# Patient Record
Sex: MALE | Race: WHITE | NOT HISPANIC OR LATINO | Employment: FULL TIME | URBAN - METROPOLITAN AREA
[De-identification: names, ages, dates, MRNs, and addresses within clinical notes are randomized per-mention and may not be internally consistent; named-entity substitution may affect disease eponyms.]

---

## 2017-04-06 ENCOUNTER — ALLSCRIPTS OFFICE VISIT (OUTPATIENT)
Dept: OTHER | Facility: OTHER | Age: 27
End: 2017-04-06

## 2017-04-06 DIAGNOSIS — I10 ESSENTIAL (PRIMARY) HYPERTENSION: ICD-10-CM

## 2017-05-06 ENCOUNTER — LAB CONVERSION - ENCOUNTER (OUTPATIENT)
Dept: OTHER | Facility: OTHER | Age: 27
End: 2017-05-06

## 2017-05-06 LAB
A/G RATIO (HISTORICAL): 2 (CALC) (ref 1–2.5)
ALBUMIN SERPL BCP-MCNC: 4.7 G/DL (ref 3.6–5.1)
ALP SERPL-CCNC: 110 U/L (ref 40–115)
ALT SERPL W P-5'-P-CCNC: 16 U/L (ref 9–46)
AST SERPL W P-5'-P-CCNC: 17 U/L (ref 10–40)
BASOPHILS # BLD AUTO: 1.1 %
BASOPHILS # BLD AUTO: 78 CELLS/UL (ref 0–200)
BILIRUB SERPL-MCNC: 1.3 MG/DL (ref 0.2–1.2)
BUN SERPL-MCNC: 11 MG/DL (ref 7–25)
BUN/CREA RATIO (HISTORICAL): ABNORMAL (CALC) (ref 6–22)
CALCIUM SERPL-MCNC: 9.7 MG/DL (ref 8.6–10.3)
CHLORIDE SERPL-SCNC: 103 MMOL/L (ref 98–110)
CHOLEST SERPL-MCNC: 140 MG/DL (ref 125–200)
CHOLEST/HDLC SERPL: 2.9 (CALC)
CO2 SERPL-SCNC: 28 MMOL/L (ref 20–31)
CREAT SERPL-MCNC: 0.84 MG/DL (ref 0.6–1.35)
DEPRECATED RDW RBC AUTO: 12.6 % (ref 11–15)
EGFR AFRICAN AMERICAN (HISTORICAL): 138 ML/MIN/1.73M2
EGFR-AMERICAN CALC (HISTORICAL): 119 ML/MIN/1.73M2
EOSINOPHIL # BLD AUTO: 156 CELLS/UL (ref 15–500)
EOSINOPHIL # BLD AUTO: 2.2 %
GAMMA GLOBULIN (HISTORICAL): 2.4 G/DL (CALC) (ref 1.9–3.7)
GLUCOSE (HISTORICAL): 88 MG/DL (ref 65–99)
HCT VFR BLD AUTO: 46.1 % (ref 38.5–50)
HDLC SERPL-MCNC: 49 MG/DL
HGB BLD-MCNC: 15.1 G/DL (ref 13.2–17.1)
LDL CHOLESTEROL (HISTORICAL): 80 MG/DL (CALC)
LYMPHOCYTES # BLD AUTO: 2066 CELLS/UL (ref 850–3900)
LYMPHOCYTES # BLD AUTO: 29.1 %
MCH RBC QN AUTO: 28.8 PG (ref 27–33)
MCHC RBC AUTO-ENTMCNC: 32.7 G/DL (ref 32–36)
MCV RBC AUTO: 88.3 FL (ref 80–100)
MONOCYTES # BLD AUTO: 518 CELLS/UL (ref 200–950)
MONOCYTES (HISTORICAL): 7.3 %
NEUTROPHILS # BLD AUTO: 4281 CELLS/UL (ref 1500–7800)
NEUTROPHILS # BLD AUTO: 60.3 %
NON-HDL-CHOL (CHOL-HDL) (HISTORICAL): 91 MG/DL (CALC)
PLATELET # BLD AUTO: 291 THOUSAND/UL (ref 140–400)
PMV BLD AUTO: 10.2 FL (ref 7.5–12.5)
POTASSIUM SERPL-SCNC: 4.4 MMOL/L (ref 3.5–5.3)
RBC # BLD AUTO: 5.22 MILLION/UL (ref 4.2–5.8)
SODIUM SERPL-SCNC: 139 MMOL/L (ref 135–146)
TOTAL PROTEIN (HISTORICAL): 7.1 G/DL (ref 6.1–8.1)
TRIGL SERPL-MCNC: 57 MG/DL
TSH SERPL DL<=0.05 MIU/L-ACNC: 3.65 MIU/L (ref 0.4–4.5)
WBC # BLD AUTO: 7.1 THOUSAND/UL (ref 3.8–10.8)

## 2017-06-19 ENCOUNTER — ALLSCRIPTS OFFICE VISIT (OUTPATIENT)
Dept: OTHER | Facility: OTHER | Age: 27
End: 2017-06-19

## 2017-08-17 ENCOUNTER — ALLSCRIPTS OFFICE VISIT (OUTPATIENT)
Dept: OTHER | Facility: OTHER | Age: 27
End: 2017-08-17

## 2017-08-31 ENCOUNTER — ALLSCRIPTS OFFICE VISIT (OUTPATIENT)
Dept: OTHER | Facility: OTHER | Age: 27
End: 2017-08-31

## 2017-11-07 ENCOUNTER — ALLSCRIPTS OFFICE VISIT (OUTPATIENT)
Dept: OTHER | Facility: OTHER | Age: 27
End: 2017-11-07

## 2017-11-08 NOTE — PROGRESS NOTES
Assessment  1  Benign essential hypertension (401 1) (I10)   2  Hypothyroidism (244 9) (E03 9)   3  Migraine headache (346 90) (G43 909)   4  Positive UGO (antinuclear antibody) (795 79) (R76 8)    Plan  Benign essential hypertension    · (1) CBC/PLT/DIFF; Status:Active; Requested for:16Qfw2721;    · (1) COMPREHENSIVE METABOLIC PANEL; Status:Active; Requested for:90Ord1368;   Hypothyroidism    · (1) TSH; Status:Active; Requested for:98Qxa9100; Positive UGO (antinuclear antibody)    · (1) UGO SCREEN W/REFLEX TO TITER/PATTERN; Status:Active; Requested for:07May2018; Discussion/Summary  Discussion Summary:   Hypertension -blood pressure is stable, patient is not on any medication  TSH recently was done by his rheumatologist and was normal  I will recheck the labs in 6 months  He will go back to the rheumatologist for a thyroid ultrasound as he has thyroid nodules  UGO-the levels are coming down  We will check the labs in 6 months  Counseling Documentation With Imm: The patient was counseled regarding diagnostic results,-- instructions for management,-- risk factor reductions,-- risks and benefits of treatment options,-- importance of compliance with treatment  Medication SE Review and Pt Understands Tx: Possible side effects of new medications were reviewed with the patient/guardian today  The treatment plan was reviewed with the patient/guardian  The patient/guardian understands and agrees with the treatment plan      Chief Complaint  Chief Complaint Chronic Condition [facility]: Patient is here today for follow up of chronic conditions described in HPI  History of Present Illness  Hypothyroidism (Follow-Up): The patient is being seen for follow-up of hypothyroidism of undetermined etiology  The patient reports no change in the condition  He has had no significant interval events  The patient is currently asymptomatic  The patient is not currently on medication for this problem     Hypertension (Follow-Up): The patient presents for follow-up of essential hypertension  The patient states he has been stable with his blood pressure control since the last visit  He has no comorbid illnesses  He has no significant interval events  Symptoms: The patient is currently asymptomatic  Medications: The patient is not currently on any medications for his hypertension--lifestyle modification only  Review of Systems  Complete-Male:   Constitutional: No fever or chills, feels well, no tiredness, no recent weight gain or weight loss  Eyes: No complaints of eye pain, no red eyes, no discharge from eyes, no itchy eyes  ENT: no complaints of earache, no hearing loss, no nosebleeds, no nasal discharge, no sore throat, no hoarseness  Cardiovascular: No complaints of slow heart rate, no fast heart rate, no chest pain, no palpitations, no leg claudication, no lower extremity  Respiratory: No complaints of shortness of breath, no wheezing, no cough, no SOB on exertion, no orthopnea or PND  Gastrointestinal: No complaints of abdominal pain, no constipation, no nausea or vomiting, no diarrhea or bloody stools  Genitourinary: No complaints of dysuria, no incontinence, no hesitancy, no nocturia, no genital lesion, no testicular pain  Musculoskeletal: No complaints of arthralgia, no myalgias, no joint swelling or stiffness, no limb pain or swelling  Integumentary: No complaints of skin rash or skin lesions, no itching, no skin wound, no dry skin  Neurological: No compliants of headache, no confusion, no convulsions, no numbness or tingling, no dizziness or fainting, no limb weakness, no difficulty walking  Psychiatric: Is not suicidal, no sleep disturbances, no anxiety or depression, no change in personality, no emotional problems  Endocrine: No complaints of proptosis, no hot flashes, no muscle weakness, no erectile dysfunction, no deepening of the voice, no feelings of weakness     Hematologic/Lymphatic: No complaints of swollen glands, no swollen glands in the neck, does not bleed easily, no easy bruising  Active Problems  1  Benign essential hypertension (401 1) (I10)   2  Hypothyroidism (244 9) (E03 9)   3  Migraine headache (346 90) (G43 909)   4  Positive UGO (antinuclear antibody) (795 79) (R76 8)    Past Medical History  1  History of Blood in stool (578 1) (K92 1)   2  History of Eosinophilic esophagitis (774 28) (K20 0)   3  History of scabies (V12 09) (Z86 19)   4  History of sinusitis (V12 69) (Z87 09)   5  History of Need for influenza vaccination (V04 81) (Z23)   6  History of Screening for cardiovascular condition (V81 2) (Z13 6)  Active Problems And Past Medical History Reviewed: The active problems and past medical history were reviewed and updated today  Surgical History  1  History of Cystoscopy For Urethral Stricture   2  History of Tonsillectomy    Family History  Family History    1  Family history of Heart disease (429 9) (I51 9)   2  Family history of Migraine (346 90) (G43 909)    Social History   · Non-smoker (V49 89) (Z78 9)  Social History Reviewed: The social history was reviewed and updated today  The social history was reviewed and is unchanged  Current Meds   1  SUMAtriptan Succinate 25 MG Oral Tablet; TAKE 1 TABLET FOR MIGRAINE RELIEF  MAY REPEAT   EVERY 2 HOURS  MAX 200MG/DAY; Therapy: [id number] to (Last Rx:06Apr2017)  Requested for: 06Apr2017 Ordered  Medication List Reviewed: The medication list was reviewed and updated today  Allergies  1  No Known Drug Allergies  2  Milk   3  No Known Environmental Allergies    Vitals  Vital Signs    Recorded: 50LMO9940 01:44PM   Heart Rate 100   Respiration 16   Systolic 602   Diastolic 70   Height 6 ft 1 in   Weight 179 lb    BMI Calculated 23 62   BSA Calculated 2 05     Physical Exam    Constitutional   General appearance: No acute distress, well appearing and well nourished      Eyes   Conjunctiva and lids: No swelling, erythema, or discharge  Ears, Nose, Mouth, and Throat   External inspection of ears and nose: Normal     Otoscopic examination: Tympanic membrance translucent with normal light reflex  Canals patent without erythema  Oropharynx: Normal with no erythema, edema, exudate or lesions  Pulmonary   Respiratory effort: No increased work of breathing or signs of respiratory distress  Auscultation of lungs: Clear to auscultation, equal breath sounds bilaterally, no wheezes, no rales, no rhonci  Cardiovascular   Palpation of heart: Normal PMI, no thrills  Auscultation of heart: Normal rate and rhythm, normal S1 and S2, without murmurs  Examination of extremities for edema and/or varicosities: Normal     Abdomen   Abdomen: Non-tender, no masses  Lymphatic   Palpation of lymph nodes in neck: No lymphadenopathy  Musculoskeletal   Gait and station: Normal     Skin   Skin and subcutaneous tissue: Normal without rashes or lesions  Neurologic   Cranial nerves: Cranial nerves 2-12 intact  Psychiatric   Orientation to person, place and time: Normal     Mood and affect: Normal          Future Appointments    Date/Time Provider Specialty Site   05/15/2018 09:45 AM KELLEY Morrison   Internal Medicine [facility]     Signatures   Electronically signed by : KELLEY Bryan ; Nov 7 2017  5:28PM EST                       (Author)

## 2017-12-12 ENCOUNTER — ALLSCRIPTS OFFICE VISIT (OUTPATIENT)
Dept: OTHER | Facility: OTHER | Age: 27
End: 2017-12-12

## 2017-12-13 NOTE — CONSULTS
Assessment    1  Positive UGO (antinuclear antibody) (424 79) (R76 8)   2  Bleeding per rectum (569 3) (K62 5)    Plan  Bleeding per rectum, Eosinophilic esophagitis    · Hydrocortisone Acetate 25 MG Rectal Suppository; INSERT 1 SUPPOSITORYRECTALLY 2 TIMES DAILY   Rx By: Nyasia Strong; Dispense: 14 Days ; #:28 Suppository; Refill: 2;Bleeding per rectum, Eosinophilic esophagitis; LAKISHA = N; Sent To: [facility]    Discussion/Summary  Discussion Summary:   Is a 29-year-old male with likely internal hemorrhoid bleeding  We are long discussion about dietary triggers  He has no alarm symptoms concerning for Crohn's disease or ulcerative colitis  we'll send hydrocortisone suppository twice a day for 2 weeks  He will also go on a fiber supplement daily  Counseling Documentation With Imm: The patient was counseled regarding diagnostic results,-- prognosis,-- risks and benefits of treatment options  History of Present Illness  HPI: He is a 29-year-old male that had 2 days minor amount of rectal bleeding  Was bright red blood he was on the outside of the stool  He denies abdominal pain diarrhea constipation melena or true hematochezia  There is no family history of Crohn's disease or ulcerative colitis  He was diagnosed with pediatric eosinophilic esophagitis he eatIs vegan  HIs weight is stable  Review of Systems  Complete-Male GI Adult:  Constitutional: No fever or chills, feels well, no tiredness, no recent weight gain or weight loss  Eyes: No complaints of eye pain, no red eyes, no discharge from eyes, no itchy eyes  ENT: no complaints of earache, no hearing loss, no nosebleeds, no nasal discharge, no sore throat, no hoarseness  Cardiovascular: No complaints of slow heart rate, no fast heart rate, no chest pain, no palpitations, no leg claudication, no lower extremity  Respiratory: No complaints of shortness of breath, no wheezing, no cough, no SOB on exertion, no orthopnea or PND  Gastrointestinal: as noted in HPI  Genitourinary: No complaints of dysuria, no incontinence, no hesitancy, no nocturia, no genital lesion, no testicular pain  Musculoskeletal: No complaints of arthralgia, no myalgias, no joint swelling or stiffness, no limb pain or swelling  Integumentary: No complaints of skin rash or skin lesions, no itching, no skin wound, no dry skin  Neurological: No compliants of headache, no confusion, no convulsions, no numbness or tingling, no dizziness or fainting, no limb weakness, no difficulty walking  Psychiatric: Is not suicidal, no sleep disturbances, no anxiety or depression, no change in personality, no emotional problems  Endocrine: No complaints of proptosis, no hot flashes, no muscle weakness, no erectile dysfunction, no deepening of the voice, no feelings of weakness  Hematologic/Lymphatic: No complaints of swollen glands, no swollen glands in the neck, does not bleed easily, no easy bruising  ROS Reviewed:   ROS reviewed  Active Problems  1  Benign essential hypertension (401 1) (I10)   2  Hypothyroidism (244 9) (E03 9)   3  Migraine headache (346 90) (G43 909)   4  Positive UGO (antinuclear antibody) (795 79) (R76 8)    Past Medical History  1  History of Blood in stool (578 1) (K92 1)   2  History of scabies (V12 09) (Z86 19)   3  History of sinusitis (V12 69) (Z87 09)   4  History of Need for influenza vaccination (V04 81) (Z23)   5  History of Screening for cardiovascular condition (V81 2) (Z13 6)  Active Problems And Past Medical History Reviewed: The active problems and past medical history were reviewed and updated today  Surgical History  1  History of Cystoscopy For Urethral Stricture   2  History of Tonsillectomy  Surgical History Reviewed: The surgical history was reviewed and updated today  Family History  Family History    1  Family history of Heart disease (429 9) (I51 9)   2   Family history of Migraine (346 90) (G43 909)  Family History Reviewed: The family history was reviewed and updated today  Social History     · Never a smoker   · Non-smoker (V49 89) (Z78 9)  Social History Reviewed: The social history was reviewed and updated today  The social history was reviewed and is unchanged  Current Meds   1  SUMAtriptan Succinate 25 MG Oral Tablet; TAKE 1 TABLET FOR MIGRAINE RELIEF  MAY REPEAT EVERY 2 HOURS  MAX 200MG/DAY; Therapy: 05YIS1386 to (Last Rx:06Apr2017)  Requested for: 06Apr2017 Ordered  Medication List Reviewed: The medication list was reviewed and updated today  Allergies  1  No Known Drug Allergies  2  Milk   3  No Known Environmental Allergies    Vitals  Vital Signs    Recorded: 37Sei3629 01:37PM   Heart Rate 75   Systolic 787   Diastolic 64   Height 6 ft 1 in   Weight 182 lb 8 oz   BMI Calculated 24 08   BSA Calculated 2 07       Physical Exam   Constitutional  General appearance: No acute distress, well appearing and well nourished  Eyes  Conjunctiva and lids: No swelling, erythema, or discharge  Pupils and irises: Equal, round and reactive to light  Ears, Nose, Mouth, and Throat  External inspection of ears and nose: Normal    Otoscopic examination: Tympanic membrance translucent with normal light reflex  Canals patent without erythema  Nasal mucosa, septum, and turbinates: Normal without edema or erythema  Oropharynx: Normal with no erythema, edema, exudate or lesions  Pulmonary  Respiratory effort: No increased work of breathing or signs of respiratory distress  Auscultation of lungs: Clear to auscultation, equal breath sounds bilaterally, no wheezes, no rales, no rhonci  Cardiovascular  Palpation of heart: Normal PMI, no thrills  Auscultation of heart: Normal rate and rhythm, normal S1 and S2, without murmurs  Examination of extremities for edema and/or varicosities: Normal    Carotid pulses: Normal    Abdomen  Abdomen: Non-tender, no masses     Liver and spleen: No hepatomegaly or splenomegaly  Lymphatic  Palpation of lymph nodes in neck: No lymphadenopathy  Musculoskeletal  Gait and station: Normal    Digits and nails: Normal without clubbing or cyanosis  Inspection/palpation of joints, bones, and muscles: Normal    Skin  Skin and subcutaneous tissue: Normal without rashes or lesions  Future Appointments    Date/Time Provider Specialty Site   05/15/2018 09:45 AM KELLEY Barr   Internal Medicine [facility]       Signatures   Electronically signed by : Geo Fegruson MD; Dec 12 2017  2:00PM EST                       (Author)

## 2018-01-12 VITALS
BODY MASS INDEX: 23.72 KG/M2 | SYSTOLIC BLOOD PRESSURE: 138 MMHG | HEIGHT: 73 IN | RESPIRATION RATE: 16 BRPM | DIASTOLIC BLOOD PRESSURE: 70 MMHG | HEART RATE: 100 BPM | WEIGHT: 179 LBS

## 2018-01-13 VITALS
OXYGEN SATURATION: 97 % | SYSTOLIC BLOOD PRESSURE: 132 MMHG | HEART RATE: 79 BPM | DIASTOLIC BLOOD PRESSURE: 80 MMHG | BODY MASS INDEX: 23.65 KG/M2 | TEMPERATURE: 98.1 F | WEIGHT: 179.25 LBS

## 2018-01-13 VITALS
BODY MASS INDEX: 24.03 KG/M2 | DIASTOLIC BLOOD PRESSURE: 86 MMHG | SYSTOLIC BLOOD PRESSURE: 142 MMHG | HEART RATE: 85 BPM | WEIGHT: 182.13 LBS | OXYGEN SATURATION: 97 %

## 2018-01-13 VITALS
WEIGHT: 182.13 LBS | HEART RATE: 80 BPM | BODY MASS INDEX: 24.14 KG/M2 | HEIGHT: 73 IN | DIASTOLIC BLOOD PRESSURE: 80 MMHG | SYSTOLIC BLOOD PRESSURE: 140 MMHG

## 2018-01-14 VITALS
HEART RATE: 82 BPM | OXYGEN SATURATION: 97 % | BODY MASS INDEX: 24.44 KG/M2 | WEIGHT: 184.38 LBS | SYSTOLIC BLOOD PRESSURE: 144 MMHG | HEIGHT: 73 IN | DIASTOLIC BLOOD PRESSURE: 78 MMHG

## 2018-01-23 VITALS
WEIGHT: 182.5 LBS | SYSTOLIC BLOOD PRESSURE: 128 MMHG | HEART RATE: 75 BPM | DIASTOLIC BLOOD PRESSURE: 64 MMHG | HEIGHT: 73 IN | BODY MASS INDEX: 24.19 KG/M2

## 2018-05-07 DIAGNOSIS — R76.8 OTHER SPECIFIED ABNORMAL IMMUNOLOGICAL FINDINGS IN SERUM: ICD-10-CM

## 2018-05-07 DIAGNOSIS — I10 ESSENTIAL (PRIMARY) HYPERTENSION: ICD-10-CM

## 2018-05-07 DIAGNOSIS — E03.9 HYPOTHYROIDISM: ICD-10-CM

## 2018-05-12 RX ORDER — SUMATRIPTAN 25 MG/1
1 TABLET, FILM COATED ORAL
COMMUNITY
Start: 2015-11-10 | End: 2018-06-01 | Stop reason: SDUPTHER

## 2018-05-12 RX ORDER — HYDROCORTISONE ACETATE 25 MG/1
1 SUPPOSITORY RECTAL 2 TIMES DAILY
COMMUNITY
Start: 2017-12-12 | End: 2018-05-15

## 2018-05-15 ENCOUNTER — OFFICE VISIT (OUTPATIENT)
Dept: INTERNAL MEDICINE CLINIC | Facility: CLINIC | Age: 28
End: 2018-05-15
Payer: COMMERCIAL

## 2018-05-15 VITALS
DIASTOLIC BLOOD PRESSURE: 80 MMHG | HEART RATE: 66 BPM | OXYGEN SATURATION: 99 % | SYSTOLIC BLOOD PRESSURE: 116 MMHG | HEIGHT: 73 IN | BODY MASS INDEX: 23.72 KG/M2 | WEIGHT: 179 LBS

## 2018-05-15 DIAGNOSIS — E03.9 ACQUIRED HYPOTHYROIDISM: Primary | ICD-10-CM

## 2018-05-15 DIAGNOSIS — I10 BENIGN ESSENTIAL HYPERTENSION: ICD-10-CM

## 2018-05-15 DIAGNOSIS — G43.709 CHRONIC MIGRAINE WITHOUT AURA WITHOUT STATUS MIGRAINOSUS, NOT INTRACTABLE: ICD-10-CM

## 2018-05-15 PROBLEM — K62.5 BLEEDING PER RECTUM: Status: RESOLVED | Noted: 2017-12-12 | Resolved: 2018-05-15

## 2018-05-15 PROBLEM — R76.8 POSITIVE ANA (ANTINUCLEAR ANTIBODY): Status: ACTIVE | Noted: 2017-08-17

## 2018-05-15 PROBLEM — K62.5 BLEEDING PER RECTUM: Status: ACTIVE | Noted: 2017-12-12

## 2018-05-15 PROCEDURE — 3074F SYST BP LT 130 MM HG: CPT | Performed by: INTERNAL MEDICINE

## 2018-05-15 PROCEDURE — 3008F BODY MASS INDEX DOCD: CPT | Performed by: INTERNAL MEDICINE

## 2018-05-15 PROCEDURE — 1036F TOBACCO NON-USER: CPT | Performed by: INTERNAL MEDICINE

## 2018-05-15 PROCEDURE — 3079F DIAST BP 80-89 MM HG: CPT | Performed by: INTERNAL MEDICINE

## 2018-05-15 PROCEDURE — 99214 OFFICE O/P EST MOD 30 MIN: CPT | Performed by: INTERNAL MEDICINE

## 2018-05-15 NOTE — PROGRESS NOTES
INTERNAL MEDICINE OFFICE VISIT  [facility] AT [address]  Tel: [phone]      NAME: Laurie Cline  AGE: 25 y o  SEX: male  : 1990   MRN: [id number]    DATE: 5/15/2018  TIME: 9:55 AM      Assessment and Plan:  1  Acquired hypothyroidism  Follow-up TSH level, patient is not on any medication     - CBC and differential; Future  - Comprehensive metabolic panel; Future  - Lipid panel; Future  - TSH, 3rd generation; Future    2  Benign essential hypertension  Very well controlled of the diet, continue to take low-sodium diet  3  Chronic migraine without aura without status migrainosus, not intractable  Takes sumatriptan as needed  Feels much better  - Counseling Documentation: patient was counseled regarding: instructions for management, risk factor reductions, prognosis, patient and family education, impressions, risks and benefits of treatment options and importance of compliance with treatment  - Medication Side Effects: Adverse side effects of medications were reviewed with the patient/guardian today  Return for follow up visit in 1 year or earlier, if needed  Chief Complaint:  Chief Complaint   Patient presents with    Well Check         History of Present Illness:   Hypothyroidism-the last TSH was within normal limits  Patient remains asymptomatic  Will follow up labs  Hypertension-has been doing very well without any medication  Migraines-still gets 4-6 migraine attacks in a month  The symptoms are under control with the sumatriptan        Active Problem List:  Patient Active Problem List   Diagnosis    Benign essential hypertension    Eosinophilic esophagitis    Acquired hypothyroidism    Migraine headache    Positive UGO (antinuclear antibody)         Past Medical History:  Past Medical History:   Diagnosis Date    Bleeding per rectum 2017         Past Surgical History:  Past Surgical History: Procedure Laterality Date    CYSTOSCOPY      for urethral stricture    TONSILLECTOMY           Family History:  Family History   Problem Relation Age of Onset    Heart disease Family     Migraines Family          Social History:  Social History     Social History    Marital status: /Civil Union     Spouse name: N/A    Number of children: N/A    Years of education: N/A     Social History Main Topics    Smoking status: Never Smoker    Smokeless tobacco: Never Used      Comment: non-smoker    Alcohol use No    Drug use: No    Sexual activity: Yes     Partners: Female     Other Topics Concern    None     Social History Narrative    None         Allergies:   Allergies   Allergen Reactions    Lac Bovis          Medications:    Current Outpatient Prescriptions:     SUMAtriptan (IMITREX) 25 mg tablet, Take 1 tablet by mouth, Disp: , Rfl:       The following portions of the patient's history were reviewed and updated as appropriate: past medical history, past surgical history, family history, social history, allergies, current medications and active problem list       Review of Systems:  Constitutional: Denies fever, chills, weight gain, weight loss, fatigue  Eyes: Denies eye redness, eye discharge, double vision, change in visual acuity  ENT: Denies hearing loss, tinnitus, sneezing, nasal congestion, nasal discharge, sore throat   Respiratory: Denies cough, expectoration, hemoptysis, shortness of breath, wheezing  Cardiovascular: Denies chest pain, palpitations, lower extremity swelling, orthopnea, PND  Gastrointestinal: Denies abdominal pain, heartburn, nausea, vomiting, hematemesis, diarrhea, bloody stools  Genito-Urinary: Denies dysuria, frequency, difficulty in micturition, nocturia, incontinence  Musculoskeletal: Denies back pain, joint pain, muscle pain  Neurologic: Denies confusion, lightheadedness, syncope, headache, focal weakness, sensory changes, seizures  Endocrine: Denies polyuria, polydipsia, temperature intolerance  Allergy and Immunology: Denies hives, insect bite sensitivity  Hematological and Lymphatic: Denies bleeding problems, swollen glands   Psychological: Denies depression, suicidal ideation, anxiety, panic, mood swings  Dermatological: Denies pruritus, rash, skin lesion changes      Vitals:  Vitals:    05/15/18 0939   BP: 116/80   Pulse: 66   SpO2: 99%       Body mass index is 23 62 kg/m²  Weight (last 2 days)     Date/Time   Weight    05/15/18 0939  81 2 (179)                Physical Examination:  General: Patient is not in acute distress  Awake, alert, responding to commands  No weight gain or loss  Head: Normocephalic  Atraumatic  Eyes: Conjunctiva and lids with no swelling, erythema or discharge  Both pupils normal sized, round and reactive to light  Sclera nonicteric  ENT: External examination of nose and ear normal  Otoscopic examination shows translucent tympanic membranes with patent canals without erythema  Oropharynx moist with no erythema, edema, exudate or lesions  Neck: Supple  JVP not raised  Trachea midline  No masses  No thyromegaly  Lungs: No signs of increased work of breathing or respiratory distress  Bilateral bronchovascular breath sounds with no crackles or rhonchi  Chest wall: No tenderness  Cardiovascular: Normal PMI  No thrills  Regular rate and rhythm  S1 and S2 normal  No murmur, rub or gallop  Gastrointestinal: Abdomen soft, nontender  No guarding or rigidity  Liver and spleen not palpable  Bowel sounds present  Neurologic: Cranial nerves II-XII intact   Cortical functions normal  Motor system - Reflexes 2+ and symmetrical  Sensations normal  Musculoskeletal: Gait normal  No joint tenderness  Integumentary: Skin normal with no rash or lesions  Lymphatic: No palpable lymph nodes in neck, axilla or groin  Extremities: No clubbing, cyanosis, edema or varicosities  Psychological: Judgement and insight normal  Mood and affect normal      Laboratory Results:  CBC with diff:   Lab Results   Component Value Date    WBC 7 1 05/05/2017    RBC 5 22 05/05/2017    HGB 15 1 05/05/2017    HCT 46 1 05/05/2017    MCV 88 3 05/05/2017    MCH 28 8 05/05/2017    RDW 12 6 05/05/2017     05/05/2017       CMP:  Lab Results   Component Value Date    CREATININE 0 84 05/05/2017    BUN 11 05/05/2017     05/05/2017    K 4 4 05/05/2017     05/05/2017    CO2 28 05/05/2017    PROT 7 1 05/05/2017    ALKPHOS 110 05/05/2017    ALT 16 05/05/2017    AST 17 05/05/2017       No results found for: HGBA1C, MG, PHOS    No results found for: TROPONINI, CKMB, CKTOTAL    Lipid Profile:   Lab Results   Component Value Date    CHOL 140 05/05/2017     Lab Results   Component Value Date    HDL 49 05/05/2017     No results found for: [facility]  Lab Results   Component Value Date    TRIG 57 05/05/2017         Health Maintenance:  Health Maintenance   Topic Date Due    HIV SCREENING  1990    DTaP,Tdap,and Td Vaccines (1 - Tdap) 05/06/2009    INFLUENZA VACCINE  09/01/2018    Depression Screening PHQ-9  05/15/2019     Immunization History   Administered Date(s) Administered    Influenza Quadrivalent, 6-35 Months IM 11/10/2015, 11/07/2017         Florida Crawford MD  5/15/2018,9:55 AM

## 2018-06-01 DIAGNOSIS — G43.909 MIGRAINE WITHOUT STATUS MIGRAINOSUS, NOT INTRACTABLE, UNSPECIFIED MIGRAINE TYPE: Primary | ICD-10-CM

## 2018-06-01 RX ORDER — SUMATRIPTAN 25 MG/1
TABLET, FILM COATED ORAL
Qty: 30 TABLET | Refills: 3 | Status: SHIPPED | OUTPATIENT
Start: 2018-06-01 | End: 2019-04-02

## 2018-06-01 RX ORDER — SUMATRIPTAN 25 MG/1
25 TABLET, FILM COATED ORAL DAILY PRN
Qty: 30 TABLET | Refills: 5 | Status: SHIPPED | OUTPATIENT
Start: 2018-06-01 | End: 2019-04-02 | Stop reason: SDUPTHER

## 2019-03-07 ENCOUNTER — TELEPHONE (OUTPATIENT)
Dept: GASTROENTEROLOGY | Facility: CLINIC | Age: 29
End: 2019-03-07

## 2019-03-07 DIAGNOSIS — K20.0 EOSINOPHILIC ESOPHAGITIS: Primary | ICD-10-CM

## 2019-03-07 DIAGNOSIS — K21.00 GASTROESOPHAGEAL REFLUX DISEASE WITH ESOPHAGITIS: ICD-10-CM

## 2019-03-07 RX ORDER — PANTOPRAZOLE SODIUM 40 MG/1
40 TABLET, DELAYED RELEASE ORAL 2 TIMES DAILY
Qty: 60 TABLET | Refills: 2 | Status: SHIPPED | OUTPATIENT
Start: 2019-03-07 | End: 2019-04-08 | Stop reason: SDUPTHER

## 2019-03-07 NOTE — TELEPHONE ENCOUNTER
Okay I see it now  I want him to start protonix 40 mg BID and then direct schedule for EGD with Mary within the next week

## 2019-03-07 NOTE — TELEPHONE ENCOUNTER
Yes he has this since 8years old  His trigger is lactose  Although he has been avoiding this and now continues to have symptoms  Its under his active problem list, we just have not treated him for it

## 2019-03-07 NOTE — TELEPHONE ENCOUNTER
Did he tell you he has a history of EoE? I just don't see it anywhere in his chart so I don't want to treat him for it if I don't know for sure he has it

## 2019-03-07 NOTE — TELEPHONE ENCOUNTER
Spoke with Ernestina Munguia  He will contact patient to schedule  Left patient a detailed message for medication and EGD

## 2019-03-07 NOTE — TELEPHONE ENCOUNTER
Script signed for protonix  Ariel Gave, can you set this patient up for direct EGD with Dr Rudolph Acosta in the next week? He is a [facility] patient and having dysphagia, history of food impactions  Thanks!

## 2019-03-07 NOTE — TELEPHONE ENCOUNTER
Spoke with patient  H/O EoE    Patient states over the last 6 months he started having difficulty swallowing, throat pain monthly and over the last month these episodes are weekly  Patient had a food impaction 8 years ago  He does not feels food stuck in her esophagus today  He is able to eat and drink  He has not seen an allergist since he was a child  Patient does not take medication for his EoE  Would you like patient to had a direct EGD?

## 2019-03-07 NOTE — TELEPHONE ENCOUNTER
Trenton Baumgarten pt-  Patient experiencing a food impaction, swallowing issue  He had a problem last evening and now has a painful,inflamed esophagus  Negar Sanchez He is not sure if he should direct scheld an EGD  Negar Sanchez Uses: Izzy Willis [phone]  Please phone [phone] to advise

## 2019-03-10 ENCOUNTER — ANESTHESIA EVENT (OUTPATIENT)
Dept: PERIOP | Facility: HOSPITAL | Age: 29
End: 2019-03-10
Payer: COMMERCIAL

## 2019-03-10 RX ORDER — SODIUM CHLORIDE, SODIUM LACTATE, POTASSIUM CHLORIDE, CALCIUM CHLORIDE 600; 310; 30; 20 MG/100ML; MG/100ML; MG/100ML; MG/100ML
50 INJECTION, SOLUTION INTRAVENOUS CONTINUOUS
Status: CANCELLED | OUTPATIENT
Start: 2019-03-10

## 2019-03-11 ENCOUNTER — HOSPITAL ENCOUNTER (OUTPATIENT)
Facility: HOSPITAL | Age: 29
Setting detail: OUTPATIENT SURGERY
Discharge: HOME | End: 2019-03-11
Attending: INTERNAL MEDICINE | Admitting: INTERNAL MEDICINE
Payer: COMMERCIAL

## 2019-03-11 ENCOUNTER — ANESTHESIA (OUTPATIENT)
Dept: PERIOP | Facility: HOSPITAL | Age: 29
End: 2019-03-11
Payer: COMMERCIAL

## 2019-03-11 VITALS
HEART RATE: 67 BPM | OXYGEN SATURATION: 97 % | RESPIRATION RATE: 19 BRPM | SYSTOLIC BLOOD PRESSURE: 112 MMHG | BODY MASS INDEX: 24.78 KG/M2 | WEIGHT: 186.95 LBS | DIASTOLIC BLOOD PRESSURE: 58 MMHG | TEMPERATURE: 98.3 F | HEIGHT: 73 IN

## 2019-03-11 DIAGNOSIS — K21.00 GASTRO-ESOPHAGEAL REFLUX DISEASE WITH ESOPHAGITIS: ICD-10-CM

## 2019-03-11 DIAGNOSIS — K20.0 EOSINOPHILIC ESOPHAGITIS: ICD-10-CM

## 2019-03-11 PROCEDURE — 88305 TISSUE EXAM BY PATHOLOGIST: CPT | Performed by: PATHOLOGY

## 2019-03-11 PROCEDURE — 43239 EGD BIOPSY SINGLE/MULTIPLE: CPT | Performed by: INTERNAL MEDICINE

## 2019-03-11 RX ORDER — LIDOCAINE HYDROCHLORIDE 10 MG/ML
INJECTION, SOLUTION INFILTRATION; PERINEURAL AS NEEDED
Status: DISCONTINUED | OUTPATIENT
Start: 2019-03-11 | End: 2019-03-11 | Stop reason: SURG

## 2019-03-11 RX ORDER — SODIUM CHLORIDE, SODIUM LACTATE, POTASSIUM CHLORIDE, CALCIUM CHLORIDE 600; 310; 30; 20 MG/100ML; MG/100ML; MG/100ML; MG/100ML
INJECTION, SOLUTION INTRAVENOUS CONTINUOUS PRN
Status: DISCONTINUED | OUTPATIENT
Start: 2019-03-11 | End: 2019-03-11 | Stop reason: SURG

## 2019-03-11 RX ORDER — PROPOFOL 10 MG/ML
INJECTION, EMULSION INTRAVENOUS AS NEEDED
Status: DISCONTINUED | OUTPATIENT
Start: 2019-03-11 | End: 2019-03-11 | Stop reason: SURG

## 2019-03-11 RX ADMIN — PROPOFOL 50 MG: 10 INJECTION, EMULSION INTRAVENOUS at 09:53

## 2019-03-11 RX ADMIN — SODIUM CHLORIDE, SODIUM LACTATE, POTASSIUM CHLORIDE, AND CALCIUM CHLORIDE: .6; .31; .03; .02 INJECTION, SOLUTION INTRAVENOUS at 09:55

## 2019-03-11 RX ADMIN — PROPOFOL 200 MG: 10 INJECTION, EMULSION INTRAVENOUS at 09:50

## 2019-03-11 RX ADMIN — LIDOCAINE HYDROCHLORIDE 100 MG: 10 INJECTION, SOLUTION INFILTRATION; PERINEURAL at 09:49

## 2019-03-11 RX ADMIN — PROPOFOL 50 MG: 10 INJECTION, EMULSION INTRAVENOUS at 09:51

## 2019-03-11 RX ADMIN — PROPOFOL 50 MG: 10 INJECTION, EMULSION INTRAVENOUS at 09:52

## 2019-03-11 RX ADMIN — SODIUM CHLORIDE, SODIUM LACTATE, POTASSIUM CHLORIDE, AND CALCIUM CHLORIDE: .6; .31; .03; .02 INJECTION, SOLUTION INTRAVENOUS at 09:32

## 2019-03-11 NOTE — ANESTHESIA POSTPROCEDURE EVALUATION
Post-Op Assessment Note    CV Status:  Stable  Pain Score: 0    Pain management: adequate     Mental Status:  Awake   Hydration Status:  Stable   PONV Controlled:  Controlled   Airway Patency:  Patent   Post Op Vitals Reviewed: Yes      Staff: CRNA           BP   127/86   Temp      Pulse  78   Resp   16   SpO2 100

## 2019-03-11 NOTE — DISCHARGE INSTRUCTIONS
Upper Endoscopy   WHAT YOU NEED TO KNOW:   An upper endoscopy is also called an upper gastrointestinal (GI) endoscopy, or an esophagogastroduodenoscopy (EGD)  You may feel bloated, gassy, or have some abdominal discomfort after your procedure  Your throat may be sore for 24 to 36 hours  You may burp or pass gas from air that is still inside your body  DISCHARGE INSTRUCTIONS:   Call 911 if:   · You have sudden chest pain or trouble breathing  Seek care immediately if:   · You feel dizzy or faint  · You have trouble swallowing  · You have severe throat pain  · Your bowel movements are very dark or black  · Your abdomen is hard and firm and you have severe pain  · You vomit blood  Contact your healthcare provider if:   · You feel full or bloated and cannot burp or pass gas  · You have not had a bowel movement for 3 days after your procedure  · You have neck pain  · You have a fever or chills  · You have nausea or are vomiting  · You have a rash or hives  · You have questions or concerns about your endoscopy  Relieve a sore throat:  Suck on throat lozenges or crushed ice  Gargle with a small amount of warm salt water  Mix 1 teaspoon of salt and 1 cup of warm water to make salt water  Relieve gas and discomfort from bloating:  Lie on your right side with a heating pad on your abdomen  Take short walks to help pass gas  Eat small meals until bloating is relieved  Rest after your procedure:  Do not drive or make important decisions until the day after your procedure  Return to your normal activity as directed  You can usually return to work the day after your procedure  Follow up with your healthcare provider as directed:  Write down your questions so you remember to ask them during your visits  © 2017 [employer]  Information is for End User's use only and may not be sold, redistributed or otherwise used for commercial purposes   All illustrations and images included in Max are the copyrighted property of [employer]  or [employer]  The above information is an  only  It is not intended as medical advice for individual conditions or treatments  Talk to your doctor, nurse or pharmacist before following any medical regimen to see if it is safe and effective for you

## 2019-03-11 NOTE — ANESTHESIA PREPROCEDURE EVALUATION
Review of Systems/Medical History  Patient summary reviewed  Chart reviewed      Cardiovascular  Hypertension controlled,    Pulmonary  Negative pulmonary ROS        GI/Hepatic  Negative GI/hepatic ROS          Negative  ROS        Endo/Other  History of thyroid disease , hypothyroidism,      GYN  Negative gynecology ROS          Hematology  Negative hematology ROS      Musculoskeletal  Negative musculoskeletal ROS        Neurology    Headaches,    Psychology   Negative psychology ROS              Physical Exam    Airway    Mallampati score: II  TM Distance: >3 FB  Neck ROM: full     Dental   No notable dental hx     Cardiovascular  Rhythm: regular, Rate: normal, Cardiovascular exam normal    Pulmonary  Pulmonary exam normal Breath sounds clear to auscultation,     Other Findings        Anesthesia Plan  ASA Score- 2     Anesthesia Type- IV sedation with anesthesia with ASA Monitors  Additional Monitors:   Airway Plan:         Plan Factors-    Induction- intravenous  Postoperative Plan- Plan for postoperative opioid use  Informed Consent- Anesthetic plan and risks discussed with patient  I personally reviewed this patient with the CRNA  Discussed and agreed on the Anesthesia Plan with the CRNA  Scott Jimenez

## 2019-03-11 NOTE — OP NOTE
ESOPHAGOGASTRODUODENOSCOPY    PROCEDURE: EGD    SEDATION: Monitored anesthesia care, check anesthesia records    ASA Class: 2    INDICATIONS:  Eosinophilic esophagitis, gastroesophageal reflux disease    CONSENT:  Informed consent was obtained for the procedure, including sedation after explaining the risks and benefits of the procedure  Risks including but not limited to bleeding, perforation, infection, and missed lesion  PREPARATION:   Telemetry, pulse oximetry, blood pressure were monitored throughout the procedure  Patient was identified by myself both verbally and by visual inspection of ID band  DESCRIPTION:   Patient was placed in the left lateral decubitus position and was sedated with the above medication  The gastroscope was introduced in to the oropharynx and the esophagus was intubated under direct visualization  Scope was passed down the esophagus up to 2nd part of the duodenum  A careful inspection was made as the gastroscope was withdrawn, including a retroflexed view of the stomach; findings and interventions are described below  FINDINGS:    #1  Esophagus- the esophagus demonstrated subtle series of concentric rings extending down the upper 1/2 of the esophagus, consistent with eosinophilic esophagitis the distal esophagus showed no significant abnormalities  The EG junction was located 54 cm from the incisors  Five cold random biopsies were taken throughout the esophagus to document the presence of the eosinophilic esophagitis  #2  Stomach- the cardia, fundus, body, antrum, and pylorus were normal in appearance  The pylorus was traversed without difficulty  #3  Duodenum- the 1st and 2nd portion of the duodenum were normal          IMPRESSIONS:      1  Eosinophilic esophagitis, biopsies obtained  2  Gastroesophageal reflux disease without evidence of esophagitis     RECOMMENDATIONS:     1  Continue with the proton pump inhibitor, Protonix 40 mg daily  2   Follow up with routine biopsies 1 week from now  3  If dysphagia persists despite a proton pump inhibitor then will need to add an ingested steroid as additional therapy     COMPLICATIONS:  None; patient tolerated the procedure well      SPECIMENS:  5 cold biopsy specimens obtained throughout the esophagus to rule out eosinophilic esophagitis    ESTIMATED BLOOD LOSS:  Minimal

## 2019-03-11 NOTE — H&P
History and Physical -  [facility]  Mounika Mccullough 25 y o  male MRN: [id number]      HPI: Mounika Mccullough is a 25y o  year old male who presents for history of gastroesophageal reflux disease and eosinophilic esophagitis      REVIEW OF SYSTEMS: Per the HPI, and otherwise unremarkable  Historical Information   Past Medical History:   Diagnosis Date    Bleeding per rectum 06/42/4838    Eosinophilic esophagitis     GERD (gastroesophageal reflux disease)      Past Surgical History:   Procedure Laterality Date    CYSTOSCOPY      for urethral stricture    ESOPHAGOGASTRODUODENOSCOPY      TONSILLECTOMY       Social History   Social History     Substance and Sexual Activity   Alcohol Use No     Social History     Substance and Sexual Activity   Drug Use No     Social History     Tobacco Use   Smoking Status Never Smoker   Smokeless Tobacco Never Used   Tobacco Comment    non-smoker     Family History   Problem Relation Age of Onset    Heart disease Family     Migraines Family        Meds/Allergies     Medications Prior to Admission   Medication    pantoprazole (PROTONIX) 40 mg tablet    SUMAtriptan (IMITREX) 25 mg tablet    SUMAtriptan (IMITREX) 25 mg tablet       Allergies   Allergen Reactions    Lac Bovis      Milk allergy -eosinophilic esophagitis       Objective     Blood pressure 133/65, pulse 92, temperature 98 3 °F (36 8 °C), temperature source Oral, resp  rate 19, height 6' 1" (1 854 m), weight 84 8 kg (186 lb 15 2 oz), SpO2 98 %  PHYSICAL EXAM    Gen: NAD  CV: RRR  CHEST: Clear  ABD: soft, NT/ND  EXT: no edema      ASSESSMENT/PLAN:  This is a 25y o  year old male here for eosinophilic esophagitis, gastroesophageal reflux disease, and he is stable and optimized for his procedure      Performed esophagogastroduodenoscopy with biopsies and possible dilation

## 2019-04-02 DIAGNOSIS — G43.909 MIGRAINE WITHOUT STATUS MIGRAINOSUS, NOT INTRACTABLE, UNSPECIFIED MIGRAINE TYPE: ICD-10-CM

## 2019-04-02 RX ORDER — SUMATRIPTAN 25 MG/1
25 TABLET, FILM COATED ORAL DAILY PRN
Qty: 25 TABLET | Refills: 3 | Status: SHIPPED | OUTPATIENT
Start: 2019-04-02 | End: 2020-03-20 | Stop reason: SDUPTHER

## 2019-04-08 ENCOUNTER — TELEPHONE (OUTPATIENT)
Dept: GASTROENTEROLOGY | Facility: CLINIC | Age: 29
End: 2019-04-08

## 2019-04-08 DIAGNOSIS — K21.00 GASTROESOPHAGEAL REFLUX DISEASE WITH ESOPHAGITIS: ICD-10-CM

## 2019-04-08 DIAGNOSIS — K20.0 EOSINOPHILIC ESOPHAGITIS: ICD-10-CM

## 2019-04-08 RX ORDER — PANTOPRAZOLE SODIUM 40 MG/1
40 TABLET, DELAYED RELEASE ORAL 2 TIMES DAILY
Qty: 180 TABLET | Refills: 0 | Status: SHIPPED | OUTPATIENT
Start: 2019-04-08 | End: 2019-04-09 | Stop reason: SDUPTHER

## 2019-04-09 RX ORDER — PANTOPRAZOLE SODIUM 40 MG/1
40 TABLET, DELAYED RELEASE ORAL 2 TIMES DAILY
Qty: 180 TABLET | Refills: 0 | Status: SHIPPED | OUTPATIENT
Start: 2019-04-09 | End: 2019-07-30 | Stop reason: SDUPTHER

## 2019-05-15 ENCOUNTER — OFFICE VISIT (OUTPATIENT)
Dept: INTERNAL MEDICINE CLINIC | Facility: CLINIC | Age: 29
End: 2019-05-15
Payer: COMMERCIAL

## 2019-05-15 VITALS
WEIGHT: 187 LBS | SYSTOLIC BLOOD PRESSURE: 120 MMHG | BODY MASS INDEX: 24.78 KG/M2 | HEIGHT: 73 IN | OXYGEN SATURATION: 98 % | HEART RATE: 64 BPM | DIASTOLIC BLOOD PRESSURE: 84 MMHG

## 2019-05-15 DIAGNOSIS — K20.0 EOSINOPHILIC ESOPHAGITIS: ICD-10-CM

## 2019-05-15 DIAGNOSIS — E03.9 ACQUIRED HYPOTHYROIDISM: ICD-10-CM

## 2019-05-15 DIAGNOSIS — I10 BENIGN ESSENTIAL HYPERTENSION: Primary | ICD-10-CM

## 2019-05-15 DIAGNOSIS — K21.00 GASTRO-ESOPHAGEAL REFLUX DISEASE WITH ESOPHAGITIS: ICD-10-CM

## 2019-05-15 DIAGNOSIS — G43.709 CHRONIC MIGRAINE WITHOUT AURA WITHOUT STATUS MIGRAINOSUS, NOT INTRACTABLE: ICD-10-CM

## 2019-05-15 PROCEDURE — 3008F BODY MASS INDEX DOCD: CPT | Performed by: INTERNAL MEDICINE

## 2019-05-15 PROCEDURE — 3074F SYST BP LT 130 MM HG: CPT | Performed by: INTERNAL MEDICINE

## 2019-05-15 PROCEDURE — 3079F DIAST BP 80-89 MM HG: CPT | Performed by: INTERNAL MEDICINE

## 2019-05-15 PROCEDURE — 99214 OFFICE O/P EST MOD 30 MIN: CPT | Performed by: INTERNAL MEDICINE

## 2019-05-21 ENCOUNTER — TELEPHONE (OUTPATIENT)
Dept: GASTROENTEROLOGY | Facility: CLINIC | Age: 29
End: 2019-05-21

## 2019-05-21 ENCOUNTER — OFFICE VISIT (OUTPATIENT)
Dept: GASTROENTEROLOGY | Facility: CLINIC | Age: 29
End: 2019-05-21
Payer: COMMERCIAL

## 2019-05-21 VITALS
WEIGHT: 188.38 LBS | BODY MASS INDEX: 24.85 KG/M2 | DIASTOLIC BLOOD PRESSURE: 74 MMHG | SYSTOLIC BLOOD PRESSURE: 122 MMHG | HEART RATE: 69 BPM

## 2019-05-21 DIAGNOSIS — K21.00 GASTRO-ESOPHAGEAL REFLUX DISEASE WITH ESOPHAGITIS: ICD-10-CM

## 2019-05-21 DIAGNOSIS — K20.0 EOSINOPHILIC ESOPHAGITIS: Primary | ICD-10-CM

## 2019-05-21 PROCEDURE — 99244 OFF/OP CNSLTJ NEW/EST MOD 40: CPT | Performed by: INTERNAL MEDICINE

## 2019-05-21 RX ORDER — FLUTICASONE PROPIONATE 220 UG/1
2 AEROSOL, METERED RESPIRATORY (INHALATION) 2 TIMES DAILY
Qty: 1 INHALER | Refills: 1 | Status: SHIPPED | OUTPATIENT
Start: 2019-05-21 | End: 2019-06-14 | Stop reason: SDUPTHER

## 2019-05-29 LAB
ALBUMIN SERPL-MCNC: 4.5 G/DL (ref 3.6–5.1)
ALBUMIN/GLOB SERPL: 2 (CALC) (ref 1–2.5)
ALP SERPL-CCNC: 100 U/L (ref 40–115)
ALT SERPL-CCNC: 9 U/L (ref 9–46)
AST SERPL-CCNC: 15 U/L (ref 10–40)
BASOPHILS # BLD AUTO: 67 CELLS/UL (ref 0–200)
BASOPHILS NFR BLD AUTO: 1.2 %
BILIRUB SERPL-MCNC: 0.6 MG/DL (ref 0.2–1.2)
BUN SERPL-MCNC: 9 MG/DL (ref 7–25)
BUN/CREAT SERPL: NORMAL (CALC) (ref 6–22)
CALCIUM SERPL-MCNC: 9.3 MG/DL (ref 8.6–10.3)
CHLORIDE SERPL-SCNC: 104 MMOL/L (ref 98–110)
CHOLEST SERPL-MCNC: 136 MG/DL
CHOLEST/HDLC SERPL: 3.1 (CALC)
CO2 SERPL-SCNC: 29 MMOL/L (ref 20–32)
CREAT SERPL-MCNC: 0.74 MG/DL (ref 0.6–1.35)
EOSINOPHIL # BLD AUTO: 78 CELLS/UL (ref 15–500)
EOSINOPHIL NFR BLD AUTO: 1.4 %
ERYTHROCYTE [DISTWIDTH] IN BLOOD BY AUTOMATED COUNT: 11.8 % (ref 11–15)
GLOBULIN SER CALC-MCNC: 2.2 G/DL (CALC) (ref 1.9–3.7)
GLUCOSE SERPL-MCNC: 87 MG/DL (ref 65–99)
HCT VFR BLD AUTO: 43.3 % (ref 38.5–50)
HDLC SERPL-MCNC: 44 MG/DL
HGB BLD-MCNC: 14.5 G/DL (ref 13.2–17.1)
LDLC SERPL CALC-MCNC: 80 MG/DL (CALC)
LYMPHOCYTES # BLD AUTO: 1652 CELLS/UL (ref 850–3900)
LYMPHOCYTES NFR BLD AUTO: 29.5 %
MCH RBC QN AUTO: 29.8 PG (ref 27–33)
MCHC RBC AUTO-ENTMCNC: 33.5 G/DL (ref 32–36)
MCV RBC AUTO: 88.9 FL (ref 80–100)
MONOCYTES # BLD AUTO: 448 CELLS/UL (ref 200–950)
MONOCYTES NFR BLD AUTO: 8 %
NEUTROPHILS # BLD AUTO: 3354 CELLS/UL (ref 1500–7800)
NEUTROPHILS NFR BLD AUTO: 59.9 %
NONHDLC SERPL-MCNC: 92 MG/DL (CALC)
PLATELET # BLD AUTO: 276 THOUSAND/UL (ref 140–400)
PMV BLD REES-ECKER: 11 FL (ref 7.5–12.5)
POTASSIUM SERPL-SCNC: 4.5 MMOL/L (ref 3.5–5.3)
PROT SERPL-MCNC: 6.7 G/DL (ref 6.1–8.1)
RBC # BLD AUTO: 4.87 MILLION/UL (ref 4.2–5.8)
SL AMB EGFR AFRICAN AMERICAN: 142 ML/MIN/1.73M2
SL AMB EGFR NON AFRICAN AMERICAN: 123 ML/MIN/1.73M2
SODIUM SERPL-SCNC: 141 MMOL/L (ref 135–146)
TRIGL SERPL-MCNC: 43 MG/DL
TSH SERPL-ACNC: 3.37 MIU/L (ref 0.4–4.5)
WBC # BLD AUTO: 5.6 THOUSAND/UL (ref 3.8–10.8)

## 2019-06-04 ENCOUNTER — TELEPHONE (OUTPATIENT)
Dept: GASTROENTEROLOGY | Facility: CLINIC | Age: 29
End: 2019-06-04

## 2019-06-14 DIAGNOSIS — K20.0 EOSINOPHILIC ESOPHAGITIS: ICD-10-CM

## 2019-06-14 RX ORDER — FLUTICASONE PROPIONATE 220 UG/1
AEROSOL, METERED RESPIRATORY (INHALATION)
Qty: 12 INHALER | Refills: 0 | Status: SHIPPED | OUTPATIENT
Start: 2019-06-14 | End: 2019-08-11 | Stop reason: SDUPTHER

## 2019-07-16 ENCOUNTER — OFFICE VISIT (OUTPATIENT)
Dept: INTERNAL MEDICINE CLINIC | Facility: CLINIC | Age: 29
End: 2019-07-16
Payer: COMMERCIAL

## 2019-07-16 VITALS
BODY MASS INDEX: 24.7 KG/M2 | HEART RATE: 76 BPM | OXYGEN SATURATION: 99 % | WEIGHT: 186.4 LBS | HEIGHT: 73 IN | DIASTOLIC BLOOD PRESSURE: 80 MMHG | SYSTOLIC BLOOD PRESSURE: 122 MMHG

## 2019-07-16 DIAGNOSIS — G43.109 VERTIGINOUS MIGRAINE: Primary | ICD-10-CM

## 2019-07-16 PROCEDURE — 3008F BODY MASS INDEX DOCD: CPT | Performed by: INTERNAL MEDICINE

## 2019-07-16 PROCEDURE — 99213 OFFICE O/P EST LOW 20 MIN: CPT | Performed by: INTERNAL MEDICINE

## 2019-07-16 PROCEDURE — 1036F TOBACCO NON-USER: CPT | Performed by: INTERNAL MEDICINE

## 2019-07-16 RX ORDER — MECLIZINE HYDROCHLORIDE 25 MG/1
25 TABLET ORAL 3 TIMES DAILY PRN
Qty: 20 TABLET | Refills: 0 | Status: SHIPPED | OUTPATIENT
Start: 2019-07-16 | End: 2020-05-21

## 2019-07-16 NOTE — PROGRESS NOTES
INTERNAL MEDICINE FOLLOW-UP OFFICE VISIT  [facility]    NAME: Timothy Cheadle  AGE: 30 y o  SEX: male  : 1990   MRN: [id number]    DATE: 2019  TIME: 11:06 AM    Assessment and Plan     Diagnoses and all orders for this visit:    Vertiginous migraine  -     meclizine (ANTIVERT) 25 mg tablet; Take 1 tablet (25 mg total) by mouth 3 (three) times a day as needed for dizziness      He was told to take the sumatriptan if the symptoms occur again  It seems to be secondary to the migraine that he has vertigo  - Counseling Documentation: patient was counseled regarding: instructions for management, risk factor reductions, prognosis, patient and family education, impressions, risks and benefits of treatment options and importance of compliance with treatment  - Medication Side Effects: Adverse side effects of medications were reviewed with the patient/guardian today  Return to office in: as needed    Chief Complaint     Chief Complaint   Patient presents with    Dizziness       History of Present Illness     Dizziness   This is a new problem  The current episode started in the past 7 days  The problem occurs constantly  The problem has been gradually improving  Associated symptoms include headaches  Pertinent negatives include no abdominal pain, arthralgias, chest pain, chills, congestion, coughing, diaphoresis, fatigue, fever, joint swelling, myalgias, nausea, neck pain, numbness, rash, sore throat, vomiting or weakness  Nothing aggravates the symptoms  He has tried nothing for the symptoms  The following portions of the patient's history were reviewed and updated as appropriate: allergies, current medications, past family history, past medical history, past social history, past surgical history and problem list     Review of Systems     Review of Systems   Constitutional: Negative for chills, diaphoresis, fatigue and fever     HENT: Negative for congestion, ear discharge, ear pain, hearing loss, postnasal drip, rhinorrhea, sinus pressure, sinus pain, sneezing, sore throat and voice change  Eyes: Negative for pain, discharge, redness and visual disturbance  Respiratory: Negative for cough, chest tightness, shortness of breath and wheezing  Cardiovascular: Negative for chest pain, palpitations and leg swelling  Gastrointestinal: Negative for abdominal distention, abdominal pain, blood in stool, constipation, diarrhea, nausea and vomiting  Endocrine: Negative for cold intolerance, heat intolerance, polydipsia, polyphagia and polyuria  Genitourinary: Negative for dysuria, flank pain, frequency, hematuria and urgency  Musculoskeletal: Negative for arthralgias, back pain, gait problem, joint swelling, myalgias, neck pain and neck stiffness  Skin: Negative for rash  Neurological: Positive for dizziness and headaches  Negative for tremors, syncope, facial asymmetry, speech difficulty, weakness, light-headedness and numbness  Hematological: Does not bruise/bleed easily  Psychiatric/Behavioral: Negative for behavioral problems, confusion and sleep disturbance  The patient is not nervous/anxious  Active Problem List     Patient Active Problem List   Diagnosis    Benign essential hypertension    Eosinophilic esophagitis    Acquired hypothyroidism    Migraine headache    Positive UGO (antinuclear antibody)    Gastro-esophageal reflux disease with esophagitis    Vertiginous migraine       Objective     /80   Pulse 76   Ht 6' 1" (1 854 m)   Wt 84 6 kg (186 lb 6 4 oz)   SpO2 99%   BMI 24 59 kg/m²     Physical Exam   Constitutional: He is oriented to person, place, and time  He appears well-developed and well-nourished  No distress  HENT:   Head: Normocephalic and atraumatic     Right Ear: External ear normal    Left Ear: External ear normal    Nose: Nose normal    Mouth/Throat: Oropharynx is clear and moist    Eyes: Conjunctivae and EOM are normal  Right eye exhibits no discharge  Left eye exhibits no discharge  No scleral icterus  Neck: Normal range of motion  Neck supple  No JVD present  No tracheal deviation present  No thyromegaly present  Cardiovascular: Normal rate, regular rhythm, normal heart sounds and intact distal pulses  Exam reveals no gallop and no friction rub  No murmur heard  Pulmonary/Chest: Effort normal and breath sounds normal  No respiratory distress  He has no wheezes  He has no rales  He exhibits no tenderness  Abdominal: Soft  Bowel sounds are normal  He exhibits no distension  There is no tenderness  There is no rebound and no guarding  Musculoskeletal: Normal range of motion  He exhibits no edema or tenderness  Lymphadenopathy:     He has no cervical adenopathy  Neurological: He is alert and oriented to person, place, and time  No cranial nerve deficit  He exhibits normal muscle tone  Coordination normal    Skin: Skin is warm and dry  No rash noted  He is not diaphoretic  No erythema  Psychiatric: He has a normal mood and affect  Judgment normal          Current Medications       Current Outpatient Medications:     FLOVENT  MCG/ACT inhaler, INHALE 2 PUFFS 2 TIMES A DAY THIS MEDICINE IS TO BE SWALLOWED   RINSE MOUTH AFTER USE , Disp: 12 Inhaler, Rfl: 0    SUMAtriptan (IMITREX) 25 mg tablet, Take 1 tablet (25 mg total) by mouth daily as needed for migraine for up to 90 days, Disp: 25 tablet, Rfl: 3    meclizine (ANTIVERT) 25 mg tablet, Take 1 tablet (25 mg total) by mouth 3 (three) times a day as needed for dizziness, Disp: 20 tablet, Rfl: 0    pantoprazole (PROTONIX) 40 mg tablet, Take 1 tablet (40 mg total) by mouth 2 (two) times a day for 90 days, Disp: 180 tablet, Rfl: 0    Health Maintenance     Health Maintenance   Topic Date Due    DTaP,Tdap,and Td Vaccines (1 - Tdap) 05/06/2009    INFLUENZA VACCINE  07/01/2019    Depression Screening PHQ  05/15/2020    BMI: Adult  05/21/2020    Pneumococcal Vaccine: 65+ Years (1 of 2 - PCV13) 05/06/2053    Pneumococcal Vaccine: Pediatrics (0 to 5 Years) and At-Risk Patients (6 to 59 Years)  Aged Out    HEPATITIS B VACCINES  Aged [employer] History   Administered Date(s) Administered    Influenza Injectable, MDCK, Preservative Free, Quadrivalent, 0 5 mL 12/20/2018    Influenza Quadrivalent, 6-35 Months IM 11/10/2015, 11/07/2017         Jeb Steele MD  [address] [place] [address]

## 2019-07-30 DIAGNOSIS — K21.00 GASTROESOPHAGEAL REFLUX DISEASE WITH ESOPHAGITIS: ICD-10-CM

## 2019-07-30 DIAGNOSIS — K20.0 EOSINOPHILIC ESOPHAGITIS: ICD-10-CM

## 2019-07-30 RX ORDER — PANTOPRAZOLE SODIUM 40 MG/1
TABLET, DELAYED RELEASE ORAL
Qty: 180 TABLET | Refills: 0 | Status: SHIPPED | OUTPATIENT
Start: 2019-07-30 | End: 2020-10-14 | Stop reason: SDUPTHER

## 2019-08-11 DIAGNOSIS — K20.0 EOSINOPHILIC ESOPHAGITIS: ICD-10-CM

## 2019-08-12 RX ORDER — FLUTICASONE PROPIONATE 220 UG/1
AEROSOL, METERED RESPIRATORY (INHALATION)
Qty: 12 INHALER | Refills: 0 | Status: SHIPPED | OUTPATIENT
Start: 2019-08-12 | End: 2020-03-20 | Stop reason: SDUPTHER

## 2019-11-19 ENCOUNTER — OFFICE VISIT (OUTPATIENT)
Dept: GASTROENTEROLOGY | Facility: CLINIC | Age: 29
End: 2019-11-19
Payer: COMMERCIAL

## 2019-11-19 VITALS
SYSTOLIC BLOOD PRESSURE: 124 MMHG | HEART RATE: 84 BPM | DIASTOLIC BLOOD PRESSURE: 80 MMHG | WEIGHT: 189.5 LBS | BODY MASS INDEX: 25 KG/M2

## 2019-11-19 DIAGNOSIS — K21.00 GASTRO-ESOPHAGEAL REFLUX DISEASE WITH ESOPHAGITIS: ICD-10-CM

## 2019-11-19 DIAGNOSIS — K20.0 EOSINOPHILIC ESOPHAGITIS: Primary | ICD-10-CM

## 2019-11-19 PROCEDURE — 99214 OFFICE O/P EST MOD 30 MIN: CPT | Performed by: INTERNAL MEDICINE

## 2019-11-19 RX ORDER — SUMATRIPTAN 25 MG/1
TABLET, FILM COATED ORAL
Refills: 3 | COMMUNITY
Start: 2019-10-21 | End: 2020-05-15

## 2019-11-19 NOTE — PROGRESS NOTES
Follow-up Note -  [facility]  Kay Watson 1990 30 y o  male     ASSESSMENT @ PLAN:   He is a 57-year-old male with biopsy-proven eosinophilic esophagitis and gastroesophageal reflux disease who is doing well  He has had under lesions of his reflux with heartburn regurgitation and we have talked about this at length  He had negative food allergy testing with immunology  1 I told him I want him on intermittent Pepcid strategy for treatment of his reflux and if he has sustained symptoms we would commit him to a 4-8 week pantoprazole course    2 I told him that if he has dysphagia or odynophagia I want him to call and we will give him a Flovent course    Reason:   GERD and eosinophilic esophagitis    HPI:   He is a 57-year-old male with gastroesophageal reflux disease and eosinophilic esophagitis who is here for follow-up  Overall he is doing very well  He went for food allergy testing and it was negative  He definitely has recurrent reflux symptoms  He does not really do very well when he was on Zantac  He has been taking the pantoprazole intermittently we talked about this is not effective strategy  He occasionally has heartburn regurgitation  He has very rarely has dysphagia no odynophagia  He took the [employer] course and spring and reports that it did not really help  He has no melena hematochezia is weight is stable  He has no rash along symptoms  REVIEW OF SYSTEMS:     CONSTITUTIONAL: Denies any fever, chills, or rigors  Good appetite, and no recent weight loss  HEENT: No earache or tinnitus  Denies hearing loss or visual disturbances  CARDIOVASCULAR: No chest pain or palpitations  RESPIRATORY: Denies any cough, hemoptysis, shortness of breath or dyspnea on exertion  GASTROINTESTINAL: As noted in the History of Present Illness  GENITOURINARY: No problems with urination  Denies any hematuria or dysuria  NEUROLOGIC: No dizziness or vertigo, denies headaches  MUSCULOSKELETAL: Denies any muscle or joint pain  SKIN: Denies skin rashes or itching  ENDOCRINE: Denies excessive thirst  Denies intolerance to heat or cold  PSYCHOSOCIAL: Denies depression or anxiety  Denies any recent memory loss  Past Medical History:   Diagnosis Date    Bleeding per rectum 01/17/7035    Eosinophilic esophagitis     GERD (gastroesophageal reflux disease)       Past Surgical History:   Procedure Laterality Date    CYSTOSCOPY      for urethral stricture    ESOPHAGOGASTRODUODENOSCOPY      DC ESOPHAGOGASTRODUODENOSCOPY TRANSORAL DIAGNOSTIC N/A 3/11/2019    Procedure: ESOPHAGOGASTRODUODENOSCOPY (EGD); Surgeon: Monica Cat DO;  Location: [facility];   Service: Gastroenterology    TONSILLECTOMY       Social History     Socioeconomic History    Marital status: /Civil Union     Spouse name: Not on file    Number of children: Not on file    Years of education: Not on file    Highest education level: Not on file   Occupational History    Not on file   Social Needs    Financial resource strain: Not on file    Food insecurity:     Worry: Not on file     Inability: Not on file    Transportation needs:     Medical: Not on file     Non-medical: Not on file   Tobacco Use    Smoking status: Never Smoker    Smokeless tobacco: Never Used    Tobacco comment: non-smoker   Substance and Sexual Activity    Alcohol use: No    Drug use: No    Sexual activity: Yes     Partners: Female   Lifestyle    Physical activity:     Days per week: Not on file     Minutes per session: Not on file    Stress: Not on file   Relationships    Social connections:     Talks on phone: Not on file     Gets together: Not on file     Attends Yarsani service: Not on file     Active member of club or organization: Not on file     Attends meetings of clubs or organizations: Not on file     Relationship status: Not on file    Intimate partner violence:     Fear of current or ex partner: Not on file Emotionally abused: Not on file     Physically abused: Not on file     Forced sexual activity: Not on file   Other Topics Concern    Not on file   Social History Narrative    Not on file     Family History   Problem Relation Age of Onset    Heart disease Family     Migraines Family      Lac bovis  Current Outpatient Medications   Medication Sig Dispense Refill    FLOVENT  MCG/ACT inhaler INHALE 2 PUFFS 2 TIMES A DAY THIS MEDICINE IS TO BE SWALLOWED  RINSE MOUTH AFTER USE  12 Inhaler 0    pantoprazole (PROTONIX) 40 mg tablet TAKE 1 TABLET BY MOUTH 2 (TWO) TIMES A DAY FOR 90 DAYS 180 tablet 0    SUMAtriptan (IMITREX) 25 mg tablet TAKE 1 TABLET (25 MG TOTAL) BY MOUTH DAILY AS NEEDED FOR MIGRAINE FOR UP TO 90 DAYS  3    meclizine (ANTIVERT) 25 mg tablet Take 1 tablet (25 mg total) by mouth 3 (three) times a day as needed for dizziness (Patient not taking: Reported on 11/19/2019) 20 tablet 0    SUMAtriptan (IMITREX) 25 mg tablet Take 1 tablet (25 mg total) by mouth daily as needed for migraine for up to 90 days 25 tablet 3     No current facility-administered medications for this visit  Blood pressure 124/80, pulse 84, weight 86 kg (189 lb 8 oz)  PHYSICAL EXAM:     General Appearance:   Alert, cooperative, no distress, appears stated age    HEENT:   Normocephalic, atraumatic, anicteric      Neck:  Supple, symmetrical, trachea midline, no adenopathy;    thyroid: no enlargement/tenderness/nodules; no carotid  bruit or JVD    Lungs:   Clear to auscultation bilaterally; no rales, rhonchi or wheezing; respirations unlabored    Heart[de-identified]   S1 and S2 normal; regular rate and rhythm; no murmur, rub, or gallop     Abdomen:   Soft, non-tender, non-distended; normal bowel sounds; no masses, no organomegaly    Genitalia:   Deferred    Rectal:   Deferred    Extremities:  No cyanosis, clubbing or edema    Pulses:  2+ and symmetric all extremities    Skin:  Skin color, texture, turgor normal, no rashes or lesions    Lymph nodes:  No palpable cervical, axillary or inguinal lymphadenopathy        Lab Results   Component Value Date    WBC 5 6 05/28/2019    HGB 14 5 05/28/2019    HCT 43 3 05/28/2019    MCV 88 9 05/28/2019     05/28/2019     Lab Results   Component Value Date    CALCIUM 9 3 05/28/2019     05/05/2017    K 4 5 05/28/2019    CO2 29 05/28/2019     05/28/2019    BUN 9 05/28/2019    CREATININE 0 74 05/28/2019     Lab Results   Component Value Date    ALT 9 05/28/2019    AST 15 05/28/2019    ALKPHOS 100 05/28/2019    BILITOT 1 3 (H) 05/05/2017     No results found for: INR, PROTIME

## 2020-01-21 ENCOUNTER — OFFICE VISIT (OUTPATIENT)
Dept: OBGYN CLINIC | Facility: CLINIC | Age: 30
End: 2020-01-21
Payer: COMMERCIAL

## 2020-01-21 ENCOUNTER — APPOINTMENT (OUTPATIENT)
Dept: RADIOLOGY | Facility: CLINIC | Age: 30
End: 2020-01-21
Payer: COMMERCIAL

## 2020-01-21 VITALS
HEART RATE: 90 BPM | DIASTOLIC BLOOD PRESSURE: 79 MMHG | WEIGHT: 189 LBS | SYSTOLIC BLOOD PRESSURE: 126 MMHG | BODY MASS INDEX: 25.05 KG/M2 | HEIGHT: 73 IN

## 2020-01-21 DIAGNOSIS — M24.812 INTERNAL DERANGEMENT OF SHOULDER, LEFT: Primary | ICD-10-CM

## 2020-01-21 DIAGNOSIS — M25.512 ACUTE PAIN OF LEFT SHOULDER: ICD-10-CM

## 2020-01-21 PROCEDURE — 99203 OFFICE O/P NEW LOW 30 MIN: CPT | Performed by: ORTHOPAEDIC SURGERY

## 2020-01-21 PROCEDURE — 73030 X-RAY EXAM OF SHOULDER: CPT

## 2020-01-21 NOTE — PROGRESS NOTES
HPI:  Patient is a 30y o  year old RHD male  who presents with chief complaint of left shoulder pain when lifting greater than 20 lb  Patient states that this has been going on for approximately 2 months  Pt states that he has pain when lifting and holding  his 35 year old son  Pt states that sleeping on his right side and using his stand up desk helps wreduce his symptoms  Pt states that he has superior and anterior pain although he has posterior upper arm pain after he lifts heavy objects and after increased activity  ROS:   General: No fever, no chills, no weight loss, no weight gain  HEENT:  No loss of hearing, no nose bleeds, no sore throat  Eyes:  No eye pain, no red eyes, no visual disturbance  Respiratory:  No cough, no shortness of breath, no wheezing  Cardiovascular:  No chest pain, no palpitations, no edema  GI: No abdominal pain, no nausea, no vomiting  Endocrine: No frequent urination, no excessive thirst  Urinary:  No dysuria, no hematuria, no incontinence  Musculoskeletal: see HPI and PE  Skin:  No rash, no wounds  Neurological:  No dizziness, no headache, no numbness  Psychiatric:  No difficulty concentrating, no depression, no suicide thoughts, no anxiety  Review of all other systems is negative    PMH:  Past Medical History:   Diagnosis Date    Bleeding per rectum 00/30/5164    Eosinophilic esophagitis     GERD (gastroesophageal reflux disease)        PSH:  Past Surgical History:   Procedure Laterality Date    CYSTOSCOPY      for urethral stricture    ESOPHAGOGASTRODUODENOSCOPY      [facility] ESOPHAGOGASTRODUODENOSCOPY TRANSORAL DIAGNOSTIC N/A 3/11/2019    Procedure: ESOPHAGOGASTRODUODENOSCOPY (EGD); Surgeon: [place] Al DO;  Location: [facility];   Service: Gastroenterology    TONSILLECTOMY         Medications:  Current Outpatient Medications   Medication Sig Dispense Refill    FLOVENT  MCG/ACT inhaler INHALE 2 PUFFS 2 TIMES A DAY THIS MEDICINE IS TO BE SWALLOWED  RINSE MOUTH AFTER USE  12 Inhaler 0    SUMAtriptan (IMITREX) 25 mg tablet TAKE 1 TABLET (25 MG TOTAL) BY MOUTH DAILY AS NEEDED FOR MIGRAINE FOR UP TO 90 DAYS  3    meclizine (ANTIVERT) 25 mg tablet Take 1 tablet (25 mg total) by mouth 3 (three) times a day as needed for dizziness (Patient not taking: Reported on 1/21/2020) 20 tablet 0    pantoprazole (PROTONIX) 40 mg tablet TAKE 1 TABLET BY MOUTH 2 (TWO) TIMES A DAY FOR 90 DAYS (Patient not taking: Reported on 1/21/2020) 180 tablet 0    SUMAtriptan (IMITREX) 25 mg tablet Take 1 tablet (25 mg total) by mouth daily as needed for migraine for up to 90 days 25 tablet 3     No current facility-administered medications for this visit  Allergies: Allergies   Allergen Reactions    Lac Bovis      Milk allergy -eosinophilic esophagitis       Family History:  Family History   Problem Relation Age of Onset    Heart disease Family     Migraines Family        Social History:  Social History     Occupational History    Not on file   Tobacco Use    Smoking status: Never Smoker    Smokeless tobacco: Never Used    Tobacco comment: non-smoker   Substance and Sexual Activity    Alcohol use: No    Drug use: No    Sexual activity: Yes     Partners: Female       Physical Exam:  General :  Alert, cooperative, no distress, appears stated age  Blood pressure 126/79, pulse 90, height 6' 1" (1 854 m), weight 85 7 kg (189 lb)  Head:  Normocephalic, without obvious abnormality, atraumatic   Eyes:  Conjunctiva/corneas clear, EOM's intact,   Ears: Both ears normal appearance, no hearing deficits      Nose: Nares normal, septum midline, no drainage    Neck: Supple,  trachea midline, no adenopathy, no tenderness, no mass   Back:   Symmetric, no curvature, ROM normal, no tenderness   Lungs:   Respirations unlabored   Chest Wall:  No tenderness or deformity   Extremities: Extremities normal, atraumatic, no cyanosis or edema      Pulses: 2+ and symmetric   Skin: Skin color, texture, turgor normal, no rashes or lesions      Neurologic: Normal           Left Shoulder Exam     Tenderness   Left shoulder tenderness location: no bicipital tenderness  Range of Motion   The patient has normal left shoulder ROM  Muscle Strength   Abduction: 4/5   Internal rotation: 5/5   External rotation: 4/5     Tests   Apprehension: negative  Herzog test: negative  Impingement: negative  Sulcus: absent    Comments:  Normal ROM without pain   Negative load shift   Good wrist strength all planes   Reflexes 2+   Good resisted elbow flexion and extension  Negative speeds  Negating Yergason's   No pain with ROM of neck              Imaging Studies: The following imaging studies were reviewed in office today  My findings are noted  X-rays of the right shoulder performed today show no acute osseous abnormalities and no degenerative changes    Assessment  Encounter Diagnoses   Name Primary?  Acute pain of left shoulder     Internal derangement of shoulder, left Yes         Plan:  Therapy was ordered to help reduce pain and increased strength of his left shoulder  Patient will follow up in the office in 3 weeks for re-evaluation  If patient is not having improvement in his left shoulder pain and weakness we will consider ordering MRI of the left shoulder to evaluate for rotator cuff tear      Scribe Attestation    I,:   Shira Torres am acting as a scribe while in the presence of the attending physician :        I,:   Radha Harden MD personally performed the services described in this documentation    as scribed in my presence :

## 2020-03-20 DIAGNOSIS — G43.909 MIGRAINE WITHOUT STATUS MIGRAINOSUS, NOT INTRACTABLE, UNSPECIFIED MIGRAINE TYPE: ICD-10-CM

## 2020-03-20 DIAGNOSIS — K20.0 EOSINOPHILIC ESOPHAGITIS: ICD-10-CM

## 2020-03-20 RX ORDER — FLUTICASONE PROPIONATE 220 UG/1
1 AEROSOL, METERED RESPIRATORY (INHALATION) 2 TIMES DAILY
Qty: 1 INHALER | Refills: 3 | Status: SHIPPED | OUTPATIENT
Start: 2020-03-20 | End: 2020-10-22

## 2020-03-20 RX ORDER — ALBUTEROL SULFATE 90 UG/1
2 AEROSOL, METERED RESPIRATORY (INHALATION) EVERY 6 HOURS PRN
Qty: 1 INHALER | Refills: 5 | Status: SHIPPED | OUTPATIENT
Start: 2020-03-20 | End: 2021-07-13

## 2020-03-20 RX ORDER — SUMATRIPTAN 25 MG/1
25 TABLET, FILM COATED ORAL DAILY PRN
Qty: 25 TABLET | Refills: 3 | Status: SHIPPED | OUTPATIENT
Start: 2020-03-20 | End: 2020-05-15

## 2020-03-30 ENCOUNTER — TELEPHONE (OUTPATIENT)
Dept: OBGYN CLINIC | Facility: CLINIC | Age: 30
End: 2020-03-30

## 2020-03-30 NOTE — TELEPHONE ENCOUNTER
Patient called back & accepted a virtual visit on 4/2/20 at 1:30 pm  Patient was also added to bookings

## 2020-03-30 NOTE — TELEPHONE ENCOUNTER
Call to this patient no answer left message to reschedule appointment for 4/01/2020 @ 4:15pm since the office will be closing earlier that day or offer a virtual or phone visit on Thursday 04/02/2020 with Dr Felicinao Marcus due to Covid-19 guide line for face to face appointments at this time  I asked to please call Back to [phone] to arrange

## 2020-04-01 DIAGNOSIS — G43.909 MIGRAINE WITHOUT STATUS MIGRAINOSUS, NOT INTRACTABLE, UNSPECIFIED MIGRAINE TYPE: ICD-10-CM

## 2020-04-01 RX ORDER — SUMATRIPTAN 25 MG/1
25 TABLET, FILM COATED ORAL DAILY PRN
Qty: 9 TABLET | Refills: 11 | Status: SHIPPED | OUTPATIENT
Start: 2020-04-01 | End: 2020-07-23

## 2020-04-02 ENCOUNTER — TELEMEDICINE (OUTPATIENT)
Dept: OBGYN CLINIC | Facility: CLINIC | Age: 30
End: 2020-04-02
Payer: COMMERCIAL

## 2020-04-02 DIAGNOSIS — M25.512 LEFT SHOULDER PAIN, UNSPECIFIED CHRONICITY: Primary | ICD-10-CM

## 2020-04-02 PROCEDURE — 99213 OFFICE O/P EST LOW 20 MIN: CPT | Performed by: ORTHOPAEDIC SURGERY

## 2020-04-20 ENCOUNTER — TELEPHONE (OUTPATIENT)
Dept: MRI IMAGING | Facility: HOSPITAL | Age: 30
End: 2020-04-20

## 2020-05-05 ENCOUNTER — HOSPITAL ENCOUNTER (OUTPATIENT)
Dept: MRI IMAGING | Facility: CLINIC | Age: 30
Discharge: HOME | End: 2020-05-05
Payer: COMMERCIAL

## 2020-05-05 DIAGNOSIS — M25.512 LEFT SHOULDER PAIN, UNSPECIFIED CHRONICITY: ICD-10-CM

## 2020-05-05 PROCEDURE — 73221 MRI JOINT UPR EXTREM W/O DYE: CPT

## 2020-05-15 ENCOUNTER — OFFICE VISIT (OUTPATIENT)
Dept: OBGYN CLINIC | Facility: CLINIC | Age: 30
End: 2020-05-15
Payer: COMMERCIAL

## 2020-05-15 VITALS
BODY MASS INDEX: 25.76 KG/M2 | HEIGHT: 73 IN | WEIGHT: 194.4 LBS | RESPIRATION RATE: 18 BRPM | DIASTOLIC BLOOD PRESSURE: 78 MMHG | HEART RATE: 80 BPM | SYSTOLIC BLOOD PRESSURE: 120 MMHG

## 2020-05-15 DIAGNOSIS — M77.8 TENDINITIS OF LEFT SHOULDER: ICD-10-CM

## 2020-05-15 DIAGNOSIS — M19.012 DEGENERATIVE JOINT DISEASE OF LEFT ACROMIOCLAVICULAR JOINT: Primary | ICD-10-CM

## 2020-05-15 PROCEDURE — 1036F TOBACCO NON-USER: CPT | Performed by: ORTHOPAEDIC SURGERY

## 2020-05-15 PROCEDURE — 99213 OFFICE O/P EST LOW 20 MIN: CPT | Performed by: ORTHOPAEDIC SURGERY

## 2020-05-15 PROCEDURE — 3074F SYST BP LT 130 MM HG: CPT | Performed by: ORTHOPAEDIC SURGERY

## 2020-05-15 PROCEDURE — 3079F DIAST BP 80-89 MM HG: CPT | Performed by: ORTHOPAEDIC SURGERY

## 2020-05-20 ENCOUNTER — TELEPHONE (OUTPATIENT)
Dept: INTERNAL MEDICINE CLINIC | Facility: CLINIC | Age: 30
End: 2020-05-20

## 2020-05-21 ENCOUNTER — OFFICE VISIT (OUTPATIENT)
Dept: INTERNAL MEDICINE CLINIC | Facility: CLINIC | Age: 30
End: 2020-05-21
Payer: COMMERCIAL

## 2020-05-21 VITALS
WEIGHT: 192.6 LBS | HEART RATE: 74 BPM | OXYGEN SATURATION: 98 % | HEIGHT: 73 IN | DIASTOLIC BLOOD PRESSURE: 84 MMHG | SYSTOLIC BLOOD PRESSURE: 126 MMHG | BODY MASS INDEX: 25.53 KG/M2

## 2020-05-21 DIAGNOSIS — G43.709 CHRONIC MIGRAINE WITHOUT AURA WITHOUT STATUS MIGRAINOSUS, NOT INTRACTABLE: ICD-10-CM

## 2020-05-21 DIAGNOSIS — K21.00 GASTRO-ESOPHAGEAL REFLUX DISEASE WITH ESOPHAGITIS: Primary | ICD-10-CM

## 2020-05-21 DIAGNOSIS — E66.3 OVERWEIGHT: ICD-10-CM

## 2020-05-21 DIAGNOSIS — E03.9 ACQUIRED HYPOTHYROIDISM: ICD-10-CM

## 2020-05-21 PROCEDURE — 3079F DIAST BP 80-89 MM HG: CPT | Performed by: INTERNAL MEDICINE

## 2020-05-21 PROCEDURE — 3074F SYST BP LT 130 MM HG: CPT | Performed by: INTERNAL MEDICINE

## 2020-05-21 PROCEDURE — 1036F TOBACCO NON-USER: CPT | Performed by: INTERNAL MEDICINE

## 2020-05-21 PROCEDURE — 3008F BODY MASS INDEX DOCD: CPT | Performed by: INTERNAL MEDICINE

## 2020-05-21 PROCEDURE — 99214 OFFICE O/P EST MOD 30 MIN: CPT | Performed by: INTERNAL MEDICINE

## 2020-05-21 RX ORDER — PROPRANOLOL HCL 60 MG
60 CAPSULE, EXTENDED RELEASE 24HR ORAL DAILY
Qty: 90 CAPSULE | Refills: 1 | Status: SHIPPED | OUTPATIENT
Start: 2020-05-21 | End: 2020-07-27 | Stop reason: ALTCHOICE

## 2020-05-27 DIAGNOSIS — G43.909 MIGRAINE WITHOUT STATUS MIGRAINOSUS, NOT INTRACTABLE, UNSPECIFIED MIGRAINE TYPE: Primary | ICD-10-CM

## 2020-05-28 DIAGNOSIS — J45.40 ASTHMA IN ADULT, MODERATE PERSISTENT, UNCOMPLICATED: Primary | ICD-10-CM

## 2020-06-06 LAB
ALBUMIN SERPL-MCNC: 4.7 G/DL (ref 3.6–5.1)
ALBUMIN/GLOB SERPL: 2 (CALC) (ref 1–2.5)
ALP SERPL-CCNC: 94 U/L (ref 36–130)
ALT SERPL-CCNC: 12 U/L (ref 9–46)
AST SERPL-CCNC: 17 U/L (ref 10–40)
BASOPHILS # BLD AUTO: 73 CELLS/UL (ref 0–200)
BASOPHILS NFR BLD AUTO: 1 %
BILIRUB SERPL-MCNC: 0.8 MG/DL (ref 0.2–1.2)
BUN SERPL-MCNC: 8 MG/DL (ref 7–25)
BUN/CREAT SERPL: NORMAL (CALC) (ref 6–22)
CALCIUM SERPL-MCNC: 9.7 MG/DL (ref 8.6–10.3)
CHLORIDE SERPL-SCNC: 104 MMOL/L (ref 98–110)
CHOLEST SERPL-MCNC: 152 MG/DL
CHOLEST/HDLC SERPL: 3.2 (CALC)
CO2 SERPL-SCNC: 29 MMOL/L (ref 20–32)
CREAT SERPL-MCNC: 0.83 MG/DL (ref 0.6–1.35)
EOSINOPHIL # BLD AUTO: 80 CELLS/UL (ref 15–500)
EOSINOPHIL NFR BLD AUTO: 1.1 %
ERYTHROCYTE [DISTWIDTH] IN BLOOD BY AUTOMATED COUNT: 11.6 % (ref 11–15)
GLOBULIN SER CALC-MCNC: 2.3 G/DL (CALC) (ref 1.9–3.7)
GLUCOSE SERPL-MCNC: 85 MG/DL (ref 65–99)
HCT VFR BLD AUTO: 46.1 % (ref 38.5–50)
HDLC SERPL-MCNC: 48 MG/DL
HGB BLD-MCNC: 15.3 G/DL (ref 13.2–17.1)
LDLC SERPL CALC-MCNC: 89 MG/DL (CALC)
LYMPHOCYTES # BLD AUTO: 2022 CELLS/UL (ref 850–3900)
LYMPHOCYTES NFR BLD AUTO: 27.7 %
MCH RBC QN AUTO: 29.4 PG (ref 27–33)
MCHC RBC AUTO-ENTMCNC: 33.2 G/DL (ref 32–36)
MCV RBC AUTO: 88.7 FL (ref 80–100)
MONOCYTES # BLD AUTO: 723 CELLS/UL (ref 200–950)
MONOCYTES NFR BLD AUTO: 9.9 %
NEUTROPHILS # BLD AUTO: 4402 CELLS/UL (ref 1500–7800)
NEUTROPHILS NFR BLD AUTO: 60.3 %
NONHDLC SERPL-MCNC: 104 MG/DL (CALC)
PLATELET # BLD AUTO: 304 THOUSAND/UL (ref 140–400)
PMV BLD REES-ECKER: 11.3 FL (ref 7.5–12.5)
POTASSIUM SERPL-SCNC: 4.7 MMOL/L (ref 3.5–5.3)
PROT SERPL-MCNC: 7 G/DL (ref 6.1–8.1)
RBC # BLD AUTO: 5.2 MILLION/UL (ref 4.2–5.8)
SL AMB EGFR AFRICAN AMERICAN: 135 ML/MIN/1.73M2
SL AMB EGFR NON AFRICAN AMERICAN: 116 ML/MIN/1.73M2
SODIUM SERPL-SCNC: 142 MMOL/L (ref 135–146)
TRIGL SERPL-MCNC: 65 MG/DL
TSH SERPL-ACNC: 3.72 MIU/L (ref 0.4–4.5)
WBC # BLD AUTO: 7.3 THOUSAND/UL (ref 3.8–10.8)

## 2020-07-21 NOTE — PROGRESS NOTES
[address] [facility]  PATIENT:  Kimberly Rivera  MRN:  [id number]  :  1990  DATE OF SERVICE:  2020  REFERRED BY: Jose Reeves MD  PMD: Giana Gary MD    Assessment/Plan:     Kimberly Rivera is a delightful 26 y o  male with a past medical history that includes hypothyroidism, asthma, borderline hypertension, eosinophilic esophagitis, GERD, migraines, positive UGO  referred here for evaluation of headache  My initial evaluation 2020     Chronic migraine without aura without status migrainosus, not intractable  Gonzalo Ford reports a long history of headaches and migraines as well as a strong family history of migraines  He reports pain is typically bilateral temporal region  He denies aura and reports typical associated migrainous features  He denies autonomic features  No new or concerning features  As of initial visit migraines stable over the past 5 years  - as of 2020: 2-3 times a week migraines for 5 years    Workup:  - Neurologic assessment reveals normal neurological exam   - at this point no new or concerning features, red flags or other findings to suggest need for imaging at this time however, if any of these would occur would recommend MRI brain with without contrast     Preventative:  - we discussed headache hygiene and lifestyle factors that may improve headaches  - trial of headache preventative supplements including continuing magnesium, adding riboflavin and butterbur  - past/failed:  Propranolol would be contraindicated due to asthma  - future options:  Discussed trying amitriptyline next, or cyproheptadine during allergy season, verapamil, gabapentin, topiramate, CGRP      Abortive:  - discussed not taking over-the-counter or prescription pain medications more than 3 days per week to prevent medication overuse/rebound headache  -     rizatriptan (MAXALT) 10 MG tablet;  Take 1 tablet (10 mg total) by mouth once as needed for migraine May repeat in 2 hours if needed  Max 2/24 hours, 9/month  Discussed proper use, possible side effects and risks  - past/failed:  Sumatriptan 25 mg help sometimes another  - future options: Alternative Triptan, metoclopramide, prochlorperazine, Toradol IM or p o , could consider trial for 5 days of Depakote or dexamethasone 4 prolonged migraine, ubrelvy, reyvow        Patient instructions      Headache/migraine treatment:   Abortive medications (for immediate treatment of a headache): It is ok to take ibuprofen, acetaminophen or naproxen (Advil, Tylenol,  Aleve, Excedrin) if they help your headaches you should limit these to No more than 3 times a week to avoid medication overuse/rebound headaches  For your more moderate to severe migraines take this medication early   Maxalt (rizatriptan) 10mg tabs - take one at the onset of headache  May repeat one time after 1-2 hours if pain has not resolved  (Max 2 a day and 9 a month)   (CAN TRY 1/2 tab THE FIRST TIME to see how you feel)    - approximately 50% of people have some side effects from this medication, the other half typically do not  Common side effects include making you feel tired, palpitations, tingling or tightness of the face or chest   Most people report the side effects are nothing compared to their migraines and do not mind these  If you have intolerable side effects we will stop        Over the counter preventive supplements for headaches/migraines   (to take every day to help prevent headaches - not to take at the time of headache):  - combo pills online if you google [employer] - such as preventa migraine, mind ease  [x] Magnesium 500mg daily (If any diarrhea or upset stomach, decrease dose  as tolerated)  [x] Riboflavin (Vitamin B2)  400mg daily  - try online   (FYI B2 may make your urine bright/neon yellow)  AND/OR   [x] Herbal medication: Petasites/Butterbur 150 mg daily - try online  (When choosing your Butterbur online or in the store, beware that there are some poor preps containing pyrrolizidine alkaloids (PAs) that can be harmful to the liver  Therefore, do not use butterbur products that are not certified and labeled as PA-free )    Prescription preventive medications for headaches/migraines   (to take every day to help prevent headaches - not to take at the time of headache):  [x] not indicated at this time, but we have options     (during allergy season you could try cyprohepatadine 4-8 mg at night for a few weeks)    Self-Monitoring:  [x] Headache calendar  Each day johnathan a number from 0-10 indicating if there was a headache and how bad it was  This can be used to monitor gradual improvement and is helpful to make medication adjustments  You can do this on paper or there is an KAYODE for a smart phone called "Migraine e Diary"  Lifestyle Recommendations:  [x] SLEEP - Maintain a regular sleep schedule: Adults need at least 7-8 hours of uninterrupted a night  Maintain good sleep hygiene:  Going to bed and waking up at consistent times, avoiding excessive daytime naps, avoiding caffeinated beverages in the evening, avoid excessive stimulation in the evening and generally using bed primarily for sleeping  One hour before bedtime would recommend turning lights down lower, decreasing your activity (may read quietly, listen to music at a low volume)  When you get into bed, should eliminate all technology (no texting, emailing, playing with your phone, iPad or tablet in bed)  [x] HYDRATION - Maintain good hydration  Drink  2L of fluid a day (4 typical small water bottles)  [x] DIET - Maintain good nutrition  In particular don't skip meals and try and eat healthy balanced meals regularly  [x] TRIGGERS - Look for other triggers and avoid them: Limit caffeine to 1-2 cups a day or less  Avoid dietary triggers that you have noticed bring on your headaches (this could include aged cheese, peanuts, MSG, aspartame and nitrates)    [x] EXERCISE - physical exercise as we all know is good for you in many ways, and not only is good for your heart, but also is beneficial for your mental health, cognitive health and  chronic pain/headaches  I would encourage at the least 5 days of physical exercise weekly for at least 30 minutes  Education and Follow-up  [x] Please call with any questions or concerns  Of course if any new concerning symptoms go to the emergency department  [x] Follow up 3 months         CC: We had the pleasure of evaluating Ryann Boyd in neurological consultation today  Ryann Boyd is a 26 y o    right handed male who presents today for evaluation of headaches  History obtained from patient as well as available medical record review  History of Present Illness:   Current medical illnesses or past medical history include hypothyroidism, asthma, borderline hypertension, eosinophilic esophagitis, GERD, migraines, positive UGO       Headaches started at what age? 20years old  How often do the headaches occur?   - as of 7/23/2020: 2-3 times a week migraines for 5 years  What time of the day do the headaches start? Around noon   How long do the headaches last? All day without meds, may come weeks at a time every other day   Are you ever headache free? Yes    Aura? without aura     Last eye exam: years ago     Where is your headache located and pain quality?   - typically bilateral  - temporal - sharp   What is the intensity of pain?  Average: 8/10  Associated symptoms:   [x] Nausea -occasionally      [] Vomiting        [] Diarrhea  [] Insomnia    [] Stiff or sore neck   [x] Problems with concentration  [x] Photophobia     [x]Phonophobia      [] Osmophobia  [] Blurred vision   [x] Prefer quiet, dark room  [x] Light-headed or dizzy   - occasionally   [] Tinnitus   [] Hands or feet tingle or feel numb/paresthesias      [] Red ear      [] Ptosis      [] Facial droop  [] Lacrimation  [] Nasal congestion/rhinorrhea   [] Flushing of face  [] Change in pupil size    Number of days missed per month because of headaches:  Work (or school) days: 0    Things that make the headache worse? No specific movements, any movement    Headache triggers:   allergy season, asthma exacerbation, dehydration, skipping meals, working on computer     Have you seen someone else for headaches or pain? Yes, PCP  Have you had trigger point injection performed and how often? No  Have you had Botox injection performed and how often? No   Have you had epidural injections or transforaminal injections performed? No  Have you ever had any Brain imaging? no    What medications do you take or have you taken for your headaches?    ABORTIVE:    OTC medications have been ineffective     Sumatriptan 25 mg - usually works 90%, but not always     PREVENTIVE:   -   Magnesium 500 mg - started around June 2020  B12     Past  Propranolol 60 mg - contraindicated due to asthma     Alternative therapies used in the past for headaches? no    LIFESTYLE  Sleep   - averages: 7-8 hours  Problems falling asleep?:   No  Problems staying asleep?:  No  - snores sometimes   - talks sometimes     Water: 4-5 per day  Caffeine: 0 per day    Mood:   Denies history of anxiety or depression or other diagnosed mood disorder    The following portions of the patient's history were reviewed and updated as appropriate: allergies, current medications, past family history, past medical history, past social history, past surgical history and problem list     Pertinent family history:  Family history of headaches:  migraine headaches in mother  Any family history of aneurysms - No  Dad depression, bro bipolar    Pertinent social history:  Work:    Lives with wife and 2 kids     Illicit Drugs: denies  Alcohol/tobacco: Denies alcohol use, Denies tobacco use    Past Medical History:     Past Medical History:   Diagnosis Date    Bleeding per rectum 43/93/9496    Eosinophilic esophagitis     GERD (gastroesophageal reflux disease)        Patient Active Problem List   Diagnosis    Benign essential hypertension    Eosinophilic esophagitis    Acquired hypothyroidism    Migraine headache    Positive UGO (antinuclear antibody)    Gastro-esophageal reflux disease with esophagitis    Vertiginous migraine    Left shoulder pain    Overweight    Asthma in adult, moderate persistent, uncomplicated       Medications:      Current Outpatient Medications   Medication Sig Dispense Refill    albuterol (PROVENTIL HFA,VENTOLIN HFA) 90 mcg/act inhaler Inhale 2 puffs every 6 (six) hours as needed for wheezing or shortness of breath 1 Inhaler 5    Ascorbic Acid (VITAMIN C PO) Take 1 tablet by mouth daily      Magnesium 500 MG TABS Take 500 mg by mouth daily      pantoprazole (PROTONIX) 40 mg tablet TAKE 1 TABLET BY MOUTH 2 (TWO) TIMES A DAY FOR 90 DAYS (Patient taking differently: Take 40 mg by mouth daily ) 180 tablet 0    fluticasone (Flovent HFA) 220 mcg/act inhaler Inhale 1 puff 2 (two) times a day Rinse mouth after use  (Patient not taking: Reported on 7/23/2020) 1 Inhaler 3    propranolol (INDERAL LA) 60 mg 24 hr capsule Take 1 capsule (60 mg total) by mouth daily (Patient not taking: Reported on 7/23/2020) 90 capsule 1    rizatriptan (MAXALT) 10 MG tablet Take 1 tablet (10 mg total) by mouth once as needed for migraine May repeat in 2 hours if needed  Max 2/24 hours, 9/month  9 tablet 6     No current facility-administered medications for this visit  Allergies:       Allergies   Allergen Reactions    Lac Bovis      Milk allergy -eosinophilic esophagitis    Beef-Derived Products Throat Swelling       Family History:     Family History   Problem Relation Age of Onset    Heart disease Family     Migraines Family     Migraines Mother     Hypertension Father     Hip dysplasia Brother        Social History:       Social History     Socioeconomic History    Marital status: /Civil Union     Spouse name: Not on file  Number of children: Not on file    Years of education: Not on file    Highest education level: Not on file   Occupational History    Not on file   Social Needs    Financial resource strain: Not on file    Food insecurity:     Worry: Not on file     Inability: Not on file    Transportation needs:     Medical: Not on file     Non-medical: Not on file   Tobacco Use    Smoking status: Never Smoker    Smokeless tobacco: Never Used    Tobacco comment: non-smoker   Substance and Sexual Activity    Alcohol use: No    Drug use: No    Sexual activity: Yes     Partners: Female   Lifestyle    Physical activity:     Days per week: 0 days     Minutes per session: 0 min    Stress: Not on file   Relationships    Social connections:     Talks on phone: Not on file     Gets together: Not on file     Attends Latter day service: Not on file     Active member of club or organization: Not on file     Attends meetings of clubs or organizations: Not on file     Relationship status: Not on file    Intimate partner violence:     Fear of current or ex partner: Not on file     Emotionally abused: Not on file     Physically abused: Not on file     Forced sexual activity: Not on file   Other Topics Concern    Not on file   Social History Narrative    Not on file         Objective:     Exam limited by pandemic/social distancing    Physical Exam:                                                                 Vitals:            Constitutional:    /72 (BP Location: Left arm, Patient Position: Sitting, Cuff Size: Standard)   Pulse 76   Temp 98 1 °F (36 7 °C) (Tympanic)   Ht 6' 1" (1 854 m)   Wt 87 4 kg (192 lb 9 6 oz)   BMI 25 41 kg/m²   BP Readings from Last 3 Encounters:   07/23/20 126/72   05/21/20 126/84   05/15/20 120/78     Pulse Readings from Last 3 Encounters:   07/23/20 76   05/21/20 74   05/15/20 80         Well developed, well nourished, well groomed   No dysmorphic features, cooperative       HEENT: Normocephalic atraumatic  No meningismus  Chest:  Respirations appear regular and unlabored  Musculoskeletal:  Appears to have Full range of motion  (see below under neurologic exam for evaluation of motor function and gait)   Skin:  Appears warm and dry, not diaphoretic  No apparent birthmarks or stigmata of neurocutaneous disease  Psychiatric:  Normal behavior and appropriate affect        Neurological Examination:     Mental status/cognitive function:   Recent and remote memory intact  Attention span and concentration as well as fund of knowledge are appropriate for age  Normal language and spontaneous speech  Cranial Nerves:  II-visual fields appear full  Unable to do fundus exam due to pandemic/social distancing  III, IV, VI-Pupils appear equal, round  Extraocular movements were full and conjugate without nystagmus  V-facial sensation symmetric  VII-facial expression symmetric, intact forehead wrinkle, strong eye closure  VIII-hearing grossly intact bilaterally   IX, X-palate elevation symmetric, no dysarthria  XI-shoulder shrug strength intact    XII-tongue protrusion midline  Motor Exam: symmetric bulk and tone throughout, no pronator drift  no atrophy, fasciculations or abnormal movements noted  Sensory: they report grossly intact light touch in all extremities  Reflexes:  Deferred due to pandemic/social distancing  Coordination:no apparent dysmetria, ataxia or tremor noted  Gait: steady casual gait  Pertinent lab results:   06/05/2020 CMP and CBC unremarkable  TSH normal     Imaging:   No head imaging in system       Review of Systems:   ROS obtained by medical assistant Personally reviewed and updated if indicated  Review of Systems   Constitutional: Negative  HENT: Negative  Eyes: Negative  Respiratory: Negative  Cardiovascular: Negative  Gastrointestinal: Negative  Endocrine: Negative  Genitourinary: Negative  Musculoskeletal: Negative  Skin: Negative  Allergic/Immunologic: Negative  Neurological: Positive for headaches  Hematological: Negative  Psychiatric/Behavioral: Negative  I have spent over 45 minutes with Patient  today in which greater than 50% of this time was spent in counseling/coordination of care regarding Prognosis, Risks and benefits of tx options, Intructions for management, Patient education, Importance of tx compliance, Risk factor reductions and Impressions        Author:  Javon Gonzalez MD 7/23/2020 9:33 AM

## 2020-07-23 ENCOUNTER — OFFICE VISIT (OUTPATIENT)
Dept: NEUROLOGY | Facility: CLINIC | Age: 30
End: 2020-07-23
Payer: COMMERCIAL

## 2020-07-23 VITALS
HEART RATE: 76 BPM | HEIGHT: 73 IN | TEMPERATURE: 98.1 F | WEIGHT: 192.6 LBS | BODY MASS INDEX: 25.53 KG/M2 | DIASTOLIC BLOOD PRESSURE: 72 MMHG | SYSTOLIC BLOOD PRESSURE: 126 MMHG

## 2020-07-23 DIAGNOSIS — G43.709 CHRONIC MIGRAINE WITHOUT AURA WITHOUT STATUS MIGRAINOSUS, NOT INTRACTABLE: ICD-10-CM

## 2020-07-23 PROCEDURE — 99244 OFF/OP CNSLTJ NEW/EST MOD 40: CPT | Performed by: PSYCHIATRY & NEUROLOGY

## 2020-07-23 PROCEDURE — 1036F TOBACCO NON-USER: CPT | Performed by: PSYCHIATRY & NEUROLOGY

## 2020-07-23 PROCEDURE — 3008F BODY MASS INDEX DOCD: CPT | Performed by: PSYCHIATRY & NEUROLOGY

## 2020-07-23 PROCEDURE — 3078F DIAST BP <80 MM HG: CPT | Performed by: PSYCHIATRY & NEUROLOGY

## 2020-07-23 PROCEDURE — 3074F SYST BP LT 130 MM HG: CPT | Performed by: PSYCHIATRY & NEUROLOGY

## 2020-07-23 RX ORDER — THIAMINE HCL 100 MG
500 TABLET ORAL DAILY
COMMUNITY
End: 2020-10-14 | Stop reason: ALTCHOICE

## 2020-07-23 RX ORDER — RIZATRIPTAN BENZOATE 10 MG/1
10 TABLET ORAL ONCE AS NEEDED
Qty: 9 TABLET | Refills: 6 | Status: SHIPPED | OUTPATIENT
Start: 2020-07-23 | End: 2020-07-28

## 2020-07-23 NOTE — PATIENT INSTRUCTIONS
Headache/migraine treatment:   Abortive medications (for immediate treatment of a headache): It is ok to take ibuprofen, acetaminophen or naproxen (Advil, Tylenol,  Aleve, Excedrin) if they help your headaches you should limit these to No more than 3 times a week to avoid medication overuse/rebound headaches  For your more moderate to severe migraines take this medication early   Maxalt (rizatriptan) 10mg tabs - take one at the onset of headache  May repeat one time after 1-2 hours if pain has not resolved  (Max 2 a day and 9 a month)   (CAN TRY 1/2 tab THE FIRST TIME to see how you feel)    - approximately 50% of people have some side effects from this medication, the other half typically do not  Common side effects include making you feel tired, palpitations, tingling or tightness of the face or chest   Most people report the side effects are nothing compared to their migraines and do not mind these  If you have intolerable side effects we will stop  Over the counter preventive supplements for headaches/migraines   (to take every day to help prevent headaches - not to take at the time of headache):  - combo pills online if you google [employer] - such as preventa migraine, mind ease  [x] Magnesium 500mg daily (If any diarrhea or upset stomach, decrease dose  as tolerated)  [x] Riboflavin (Vitamin B2)  400mg daily  - try online   (FYI B2 may make your urine bright/neon yellow)  AND/OR   [x] Herbal medication: Petasites/Butterbur 150 mg daily - try online  (When choosing your Butterbur online or in the store, beware that there are some poor preps containing pyrrolizidine alkaloids (PAs) that can be harmful to the liver   Therefore, do not use butterbur products that are not certified and labeled as PA-free )    Prescription preventive medications for headaches/migraines   (to take every day to help prevent headaches - not to take at the time of headache):  [x] not indicated at this time, but we have options     (during allergy season you could try cyprohepatadine 4-8 mg at night for a few weeks)    Self-Monitoring:  [x] Headache calendar  Each day johnathan a number from 0-10 indicating if there was a headache and how bad it was  This can be used to monitor gradual improvement and is helpful to make medication adjustments  You can do this on paper or there is an KAYODE for a smart phone called "Migraine e Diary"  Lifestyle Recommendations:  [x] SLEEP - Maintain a regular sleep schedule: Adults need at least 7-8 hours of uninterrupted a night  Maintain good sleep hygiene:  Going to bed and waking up at consistent times, avoiding excessive daytime naps, avoiding caffeinated beverages in the evening, avoid excessive stimulation in the evening and generally using bed primarily for sleeping  One hour before bedtime would recommend turning lights down lower, decreasing your activity (may read quietly, listen to music at a low volume)  When you get into bed, should eliminate all technology (no texting, emailing, playing with your phone, iPad or tablet in bed)  [x] HYDRATION - Maintain good hydration  Drink  2L of fluid a day (4 typical small water bottles)  [x] DIET - Maintain good nutrition  In particular don't skip meals and try and eat healthy balanced meals regularly  [x] TRIGGERS - Look for other triggers and avoid them: Limit caffeine to 1-2 cups a day or less  Avoid dietary triggers that you have noticed bring on your headaches (this could include aged cheese, peanuts, MSG, aspartame and nitrates)  [x] EXERCISE - physical exercise as we all know is good for you in many ways, and not only is good for your heart, but also is beneficial for your mental health, cognitive health and  chronic pain/headaches  I would encourage at the least 5 days of physical exercise weekly for at least 30 minutes  Education and Follow-up  [x] Please call with any questions or concerns   Of course if any new concerning symptoms go to the emergency department    [x] Follow up 3 months virtual visit

## 2020-07-23 NOTE — PROGRESS NOTES
Review of Systems   Constitutional: Negative  HENT: Negative  Eyes: Negative  Respiratory: Negative  Cardiovascular: Negative  Gastrointestinal: Negative  Endocrine: Negative  Genitourinary: Negative  Musculoskeletal: Negative  Skin: Negative  Allergic/Immunologic: Negative  Neurological: Positive for headaches  Hematological: Negative  Psychiatric/Behavioral: Negative

## 2020-07-24 ENCOUNTER — TELEPHONE (OUTPATIENT)
Dept: PULMONOLOGY | Facility: CLINIC | Age: 30
End: 2020-07-24

## 2020-07-27 ENCOUNTER — CONSULT (OUTPATIENT)
Dept: PULMONOLOGY | Facility: CLINIC | Age: 30
End: 2020-07-27
Payer: COMMERCIAL

## 2020-07-27 VITALS
DIASTOLIC BLOOD PRESSURE: 78 MMHG | TEMPERATURE: 99.3 F | HEART RATE: 66 BPM | RESPIRATION RATE: 12 BRPM | WEIGHT: 194 LBS | SYSTOLIC BLOOD PRESSURE: 120 MMHG | HEIGHT: 73 IN | OXYGEN SATURATION: 98 % | BODY MASS INDEX: 25.71 KG/M2

## 2020-07-27 DIAGNOSIS — J45.20 MILD INTERMITTENT ASTHMA WITHOUT COMPLICATION: Primary | ICD-10-CM

## 2020-07-27 DIAGNOSIS — R06.02 SHORTNESS OF BREATH: ICD-10-CM

## 2020-07-27 PROCEDURE — 3078F DIAST BP <80 MM HG: CPT | Performed by: INTERNAL MEDICINE

## 2020-07-27 PROCEDURE — 3008F BODY MASS INDEX DOCD: CPT | Performed by: INTERNAL MEDICINE

## 2020-07-27 PROCEDURE — 99244 OFF/OP CNSLTJ NEW/EST MOD 40: CPT | Performed by: INTERNAL MEDICINE

## 2020-07-27 PROCEDURE — 3074F SYST BP LT 130 MM HG: CPT | Performed by: INTERNAL MEDICINE

## 2020-07-27 PROCEDURE — 1036F TOBACCO NON-USER: CPT | Performed by: INTERNAL MEDICINE

## 2020-07-27 RX ORDER — DIPHENOXYLATE HYDROCHLORIDE AND ATROPINE SULFATE 2.5; .025 MG/1; MG/1
1 TABLET ORAL DAILY
COMMUNITY
End: 2022-08-09

## 2020-07-27 RX ORDER — ALBUTEROL SULFATE 2.5 MG/3ML
2.5 SOLUTION RESPIRATORY (INHALATION) EVERY 6 HOURS PRN
Qty: 1080 ML | Refills: 0 | Status: SHIPPED | OUTPATIENT
Start: 2020-07-27 | End: 2021-07-13

## 2020-07-27 NOTE — PROGRESS NOTES
Assessment/Plan:     Diagnoses and all orders for this visit:    Mild intermittent asthma without complication  -     [facility] Allergy Panel, Adult; Future  -     Hypersensitivity pnuemonitis profile; Future  -     Complete PFT with post bronchodilator; Future  -     albuterol (2 5 mg/3 mL) 0 083 % nebulizer solution; Take 1 vial (2 5 mg total) by nebulization every 6 (six) hours as needed for wheezing or shortness of breath  -     XR chest pa & lateral; Future    Shortness of breath  -     XR chest pa & lateral; Future    Other orders  -     multivitamin (THERAGRAN) TABS; Take 1 tablet by mouth daily      mild intermittent asthma currently without any complication  Will get complete PFT with post bronchodilator lung volumes and DLCO  Will get respiratory allergy panel with IgE  Hypersensitivity pneumonitis panel, he states he unclear if he is exposed to any mold, he does work in a basement  Will also get a chest x-ray PA and lateral view  Discussed leg regarding long-acting and short-acting medications in asthma  He is currently stop the Flovent, not using it since mid May  Currently not having the need to use his rescue inhaler in the past 3-4 weeks  I have asked him to hold off on the [address] for now  He will continue with albuterol MDI 2 puffs 4 times daily as needed  MDI technique reviewed with the patient  I will see him back in 3 months or p r n  Earlier as needed  Return in about 3 months (around 10/27/2020)  All questions are answered to the patient's satisfaction and understanding  He verbalizes understanding  He is encouraged to call with any further questions or concerns  Portions of the record may have been created with voice recognition software  Occasional wrong word or "sound a like" substitutions may have occurred due to the inherent limitations of voice recognition software  Read the chart carefully and recognize, using context, where substitutions have occurred  a    Electronically Signed by Rakan Ruiz MD    ______________________________________________________________________    Chief Complaint:   Chief Complaint   Patient presents with    Asthma        Patient ID: Michele Ledesma is a 26 y o  y o  male has a past medical history of Bleeding per rectum (75/78/4312), Eosinophilic esophagitis, and GERD (gastroesophageal reflux disease)  7/27/2020  Patient presents today for initial visit  Michele Ledesma is a very pleasant 68-year-old gentleman who has never smoked, who works as a , states he has been home working from home for the past 3-4 years, has history of childhood asthma states he was bad when he was very young, has to be on bronchodilators then, he has states he outgrew his asthma recently in the month of April unclear if it is because of his allergies during the spring time, he had real bad wheezing, for which she was given bronchodilators as well as a nebulizer machine with was with a albuterol  Also has been placed on inhaled corticosteroids during the spring time  Currently which she has stopped in the mid May  He states after the initial treatment in the spring he has been doing well, currently not having the need to use his rescue inhaler in the past 3-4 weeks  primary symptoms   Associated symptoms include headaches  Pertinent negatives include no abdominal pain, arthralgias, chest pain, chills, congestion, coughing, diaphoresis, fatigue, fever, myalgias, neck pain, rash, sore throat, vomiting or weakness  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    Pets/Enviroment: 2 cats   Travel history:  Review of Systems   Constitutional: Negative for appetite change, chills, diaphoresis, fatigue, fever and unexpected weight change  HENT: Negative for congestion, ear discharge, ear pain, nosebleeds, postnasal drip, rhinorrhea, sinus pain, sneezing, sore throat, trouble swallowing and voice change  Eyes: Negative for pain, discharge and visual disturbance  Respiratory: Positive for shortness of breath  Negative for apnea, cough, choking, chest tightness, wheezing and stridor  Cardiovascular: Negative for chest pain, palpitations and leg swelling  Gastrointestinal: Negative for abdominal pain, blood in stool, constipation, diarrhea and vomiting  Endocrine: Negative for cold intolerance, heat intolerance, polydipsia, polyphagia and polyuria  Genitourinary: Negative for difficulty urinating and dysuria  Musculoskeletal: Negative for arthralgias, myalgias and neck pain  Skin: Negative for pallor and rash  Allergic/Immunologic: Negative for environmental allergies and food allergies  Neurological: Positive for headaches  Negative for dizziness, speech difficulty, weakness and light-headedness  Hematological: Negative for adenopathy  Does not bruise/bleed easily  Psychiatric/Behavioral: Negative for agitation, confusion and sleep disturbance  The patient is not nervous/anxious  Social history: He reports that he has never smoked  He has never used smokeless tobacco  He reports that he does not drink alcohol or use drugs  Past surgical history:   Past Surgical History:   Procedure Laterality Date    CYSTOSCOPY      for urethral stricture    ESOPHAGOGASTRODUODENOSCOPY      OK ESOPHAGOGASTRODUODENOSCOPY TRANSORAL DIAGNOSTIC N/A 3/11/2019    Procedure: ESOPHAGOGASTRODUODENOSCOPY (EGD); Surgeon: Emanuel Jiménez DO;  Location: [facility];   Service: Gastroenterology    TONSILLECTOMY       Family history:   Family History   Problem Relation Age of Onset    Heart disease Family     Migraines Family    Goodman Migraines Mother     Hypertension Father     Hip dysplasia Brother        Immunization History   Administered Date(s) Administered    Influenza Injectable, MDCK, Preservative Free, Quadrivalent, 0 5 mL 12/20/2018    Influenza Quadrivalent, 6-35 Months IM 11/10/2015, 11/07/2017     Current Outpatient Medications   Medication Sig Dispense Refill    albuterol (PROVENTIL HFA,VENTOLIN HFA) 90 mcg/act inhaler Inhale 2 puffs every 6 (six) hours as needed for wheezing or shortness of breath 1 Inhaler 5    multivitamin (THERAGRAN) TABS Take 1 tablet by mouth daily      pantoprazole (PROTONIX) 40 mg tablet TAKE 1 TABLET BY MOUTH 2 (TWO) TIMES A DAY FOR 90 DAYS (Patient taking differently: Take 40 mg by mouth daily ) 180 tablet 0    rizatriptan (MAXALT) 10 MG tablet Take 1 tablet (10 mg total) by mouth once as needed for migraine May repeat in 2 hours if needed  Max 2/24 hours, 9/month  9 tablet 6    albuterol (2 5 mg/3 mL) 0 083 % nebulizer solution Take 1 vial (2 5 mg total) by nebulization every 6 (six) hours as needed for wheezing or shortness of breath 1080 mL 0    Ascorbic Acid (VITAMIN C PO) Take 1 tablet by mouth daily      fluticasone (Flovent HFA) 220 mcg/act inhaler Inhale 1 puff 2 (two) times a day Rinse mouth after use  (Patient not taking: Reported on 7/23/2020) 1 Inhaler 3    Magnesium 500 MG TABS Take 500 mg by mouth daily       No current facility-administered medications for this visit  Allergies: Lac bovis and Beef-derived products    Objective:  Vitals:    07/27/20 0911   BP: 120/78   BP Location: Left arm   Patient Position: Sitting   Cuff Size: Standard   Pulse: 66   Resp: 12   Temp: 99 3 °F (37 4 °C)   TempSrc: Temporal   SpO2: 98%   Weight: 88 kg (194 lb)   Height: 6' 1" (1 854 m)   Oxygen Therapy  SpO2: 98 %    Wt Readings from Last 3 Encounters:   07/27/20 88 kg (194 lb)   07/23/20 87 4 kg (192 lb 9 6 oz)   05/21/20 87 4 kg (192 lb 9 6 oz)     Body mass index is 25 6 kg/m²  Physical Exam   Constitutional: He is oriented to person, place, and time  He appears well-developed and well-nourished  HENT:   Head: Normocephalic and atraumatic  Eyes: Pupils are equal, round, and reactive to light  Conjunctivae are normal    Neck: Normal range of motion  Neck supple  No JVD present  No thyromegaly present  Cardiovascular: Normal rate, regular rhythm and normal heart sounds  Exam reveals no gallop and no friction rub  No murmur heard  Pulmonary/Chest: Effort normal and breath sounds normal  No respiratory distress  He has no wheezes  He has no rales  He exhibits no tenderness  Musculoskeletal: Normal range of motion  He exhibits no edema, tenderness or deformity  Lymphadenopathy:     He has no cervical adenopathy  Neurological: He is alert and oriented to person, place, and time  Skin: Skin is warm and dry  Psychiatric: He has a normal mood and affect  Nursing note and vitals reviewed  Diagnostics:  I have personally reviewed pertinent reports        Answers for HPI/ROS submitted by the patient on 7/22/2020   Primary symptoms  Do you have chest tightness?: Yes  Chronicity: recurrent  When did you first notice your symptoms?: more than 1 month ago  How often do your symptoms occur?: intermittently  Since you first noticed this problem, how has it changed?: resolved  Do you have shortness of breath that occurs with effort or exertion?: No  Do you have ear congestion?: No  Do you have heartburn?: No  Do you have fatigue?: No  Do you have nasal congestion?: No  Do you have shortness of breath when lying flat?: No  Do you have shortness of breath when you wake up?: Yes  Do you have sweats?: No  Have you experienced weight loss?: No  Which of the following makes your symptoms worse?: change in weather, pollen  Which of the following makes your symptoms better?: steroid inhaler

## 2020-07-28 ENCOUNTER — PATIENT MESSAGE (OUTPATIENT)
Dept: NEUROLOGY | Facility: CLINIC | Age: 30
End: 2020-07-28

## 2020-07-28 DIAGNOSIS — G43.709 CHRONIC MIGRAINE WITHOUT AURA WITHOUT STATUS MIGRAINOSUS, NOT INTRACTABLE: Primary | ICD-10-CM

## 2020-07-28 RX ORDER — SUMATRIPTAN 100 MG/1
100 TABLET, FILM COATED ORAL ONCE AS NEEDED
Qty: 9 TABLET | Refills: 3 | Status: SHIPPED | OUTPATIENT
Start: 2020-07-28 | End: 2020-10-14 | Stop reason: SDUPTHER

## 2020-09-30 ENCOUNTER — TELEPHONE (OUTPATIENT)
Dept: NEUROLOGY | Facility: CLINIC | Age: 30
End: 2020-09-30

## 2020-09-30 NOTE — TELEPHONE ENCOUNTER
LI for patient as a reminder for their upcoming appointment in 2 weeks  Asked patient if they are having any emergent or worsening symptoms to please give us a call back

## 2020-10-13 DIAGNOSIS — K20.0 EOSINOPHILIC ESOPHAGITIS: ICD-10-CM

## 2020-10-13 DIAGNOSIS — K21.00 GASTROESOPHAGEAL REFLUX DISEASE WITH ESOPHAGITIS: ICD-10-CM

## 2020-10-14 ENCOUNTER — TELEMEDICINE (OUTPATIENT)
Dept: NEUROLOGY | Facility: CLINIC | Age: 30
End: 2020-10-14
Payer: COMMERCIAL

## 2020-10-14 ENCOUNTER — TELEPHONE (OUTPATIENT)
Dept: NEUROLOGY | Facility: CLINIC | Age: 30
End: 2020-10-14

## 2020-10-14 VITALS — BODY MASS INDEX: 25.31 KG/M2 | WEIGHT: 191 LBS | TEMPERATURE: 97.1 F | HEIGHT: 73 IN

## 2020-10-14 DIAGNOSIS — G43.709 CHRONIC MIGRAINE WITHOUT AURA WITHOUT STATUS MIGRAINOSUS, NOT INTRACTABLE: Primary | ICD-10-CM

## 2020-10-14 PROCEDURE — 99214 OFFICE O/P EST MOD 30 MIN: CPT | Performed by: PSYCHIATRY & NEUROLOGY

## 2020-10-14 PROCEDURE — 1036F TOBACCO NON-USER: CPT | Performed by: PSYCHIATRY & NEUROLOGY

## 2020-10-14 RX ORDER — CYPROHEPTADINE HYDROCHLORIDE 4 MG/1
TABLET ORAL
Qty: 60 TABLET | Refills: 6 | Status: SHIPPED | OUTPATIENT
Start: 2020-10-14 | End: 2021-06-18

## 2020-10-14 RX ORDER — PANTOPRAZOLE SODIUM 40 MG/1
40 TABLET, DELAYED RELEASE ORAL DAILY
Qty: 30 TABLET | Refills: 5 | Status: SHIPPED | OUTPATIENT
Start: 2020-10-14 | End: 2020-10-22 | Stop reason: SDUPTHER

## 2020-10-14 RX ORDER — SUMATRIPTAN 100 MG/1
100 TABLET, FILM COATED ORAL ONCE AS NEEDED
Qty: 9 TABLET | Refills: 6 | Status: SHIPPED | OUTPATIENT
Start: 2020-10-14 | End: 2022-01-02 | Stop reason: SDUPTHER

## 2020-10-22 ENCOUNTER — OFFICE VISIT (OUTPATIENT)
Dept: GASTROENTEROLOGY | Facility: CLINIC | Age: 30
End: 2020-10-22
Payer: COMMERCIAL

## 2020-10-22 VITALS
WEIGHT: 192.38 LBS | BODY MASS INDEX: 25.38 KG/M2 | DIASTOLIC BLOOD PRESSURE: 88 MMHG | SYSTOLIC BLOOD PRESSURE: 128 MMHG | HEART RATE: 85 BPM | TEMPERATURE: 96.9 F

## 2020-10-22 DIAGNOSIS — K21.00 GASTROESOPHAGEAL REFLUX DISEASE WITH ESOPHAGITIS WITHOUT HEMORRHAGE: ICD-10-CM

## 2020-10-22 DIAGNOSIS — K20.0 EOSINOPHILIC ESOPHAGITIS: Primary | ICD-10-CM

## 2020-10-22 DIAGNOSIS — K21.00 GASTROESOPHAGEAL REFLUX DISEASE WITH ESOPHAGITIS: ICD-10-CM

## 2020-10-22 PROCEDURE — 99214 OFFICE O/P EST MOD 30 MIN: CPT | Performed by: INTERNAL MEDICINE

## 2020-10-22 RX ORDER — PANTOPRAZOLE SODIUM 40 MG/1
40 TABLET, DELAYED RELEASE ORAL DAILY
Qty: 30 TABLET | Refills: 11 | Status: SHIPPED | OUTPATIENT
Start: 2020-10-22 | End: 2022-01-07 | Stop reason: SDUPTHER

## 2021-02-12 ENCOUNTER — TELEPHONE (OUTPATIENT)
Dept: NEUROLOGY | Facility: CLINIC | Age: 31
End: 2021-02-12

## 2021-02-12 NOTE — TELEPHONE ENCOUNTER
Called and left a voicemail that I was calling in regards to upcoming apt  Requested that the patient call us back if cannot keep this apt, or if any issues or concerns the Dr  Should be aware ahead of time

## 2021-02-26 ENCOUNTER — OFFICE VISIT (OUTPATIENT)
Dept: NEUROLOGY | Facility: CLINIC | Age: 31
End: 2021-02-26
Payer: COMMERCIAL

## 2021-02-26 VITALS
SYSTOLIC BLOOD PRESSURE: 118 MMHG | BODY MASS INDEX: 24.92 KG/M2 | HEART RATE: 64 BPM | DIASTOLIC BLOOD PRESSURE: 68 MMHG | WEIGHT: 188 LBS | HEIGHT: 73 IN

## 2021-02-26 DIAGNOSIS — G43.709 CHRONIC MIGRAINE WITHOUT AURA WITHOUT STATUS MIGRAINOSUS, NOT INTRACTABLE: Primary | ICD-10-CM

## 2021-02-26 PROCEDURE — 1036F TOBACCO NON-USER: CPT | Performed by: PSYCHIATRY & NEUROLOGY

## 2021-02-26 PROCEDURE — 99214 OFFICE O/P EST MOD 30 MIN: CPT | Performed by: PSYCHIATRY & NEUROLOGY

## 2021-02-26 PROCEDURE — 3008F BODY MASS INDEX DOCD: CPT | Performed by: PSYCHIATRY & NEUROLOGY

## 2021-02-26 RX ORDER — PROCHLORPERAZINE MALEATE 10 MG
10 TABLET ORAL EVERY 6 HOURS PRN
Qty: 12 TABLET | Refills: 6 | Status: SHIPPED | OUTPATIENT
Start: 2021-02-26 | End: 2021-06-18

## 2021-02-26 NOTE — PROGRESS NOTES
Review of Systems    {LimROS-complete:36409}      Review of Systems   Constitutional: Negative  Negative for appetite change and fever  HENT: Negative  Negative for hearing loss, tinnitus, trouble swallowing and voice change  Eyes: Negative  Negative for photophobia and pain  Respiratory: Negative  Negative for shortness of breath  Cardiovascular: Negative  Negative for palpitations  Gastrointestinal: Negative  Negative for nausea and vomiting  Endocrine: Negative  Negative for cold intolerance  Genitourinary: Negative  Negative for dysuria, frequency and urgency  Musculoskeletal: Negative  Negative for myalgias and neck pain  Skin: Negative  Negative for rash  Neurological: Positive for headaches (2 per week)  Negative for dizziness, tremors, seizures, syncope, facial asymmetry, speech difficulty, weakness, light-headedness and numbness  Hematological: Negative  Does not bruise/bleed easily  Psychiatric/Behavioral: Negative  Negative for confusion, hallucinations and sleep disturbance

## 2021-02-26 NOTE — PROGRESS NOTES
[facility] Consult - Follow up   PATIENT:  Kay Watson  MRN:  [id number]  :  1990  DATE OF SERVICE:  2021  REFERRED BY: Marnie Lopez MD  PMD: Gisel Estevez MD    Assessment/Plan:     Jessi Cheng a delightful 30 y  o  male with a past medical history that includes hypothyroidism, asthma, borderline hypertension, eosinophilic esophagitis, GERD, migraines, positive UGO  referred here for evaluation of headache  My initial evaluation 2020  Follow up 10/14/2020, 2021     Chronic migraine without aura without status migrainosus, not intractable  Angel Galo reports a long history of headaches and migraines as well as a strong family history of migraines  Talia Saleem reports pain is typically bilateral temporal region  Talia Saleem denies aura and reports typical associated migrainous features   He denies autonomic features   No new or concerning features   As of initial visit migraines stable over the past 5 years  - as of 2020: 2-3 times a week migraines for 5 years  - As of 10/14/2020: His had some improvement migraines once a week although lately he thinks related to seasonal changes were every day this week  Taking headache preventative supplements on and off for 2-3 months  Trial of cyproheptadine 4-8 mg nightly during seasonal changes for prevention  Did not tolerate rizatriptan due to side effects  Continue sumatriptan for abortive  - as of 2021: He reports overall improvement on headache preventative supplements with migraines approximately twice per week that respond 90% of the time to sumatriptan 25 mg  He has had occasional migraine with nausea for which we will start trial of prochlorperazine to be taking on its own or with sumatriptan  He has not yet tried cyproheptadine during seasonal changes for prevention (tried once during the day with Side effects)    If this does not work would recommend Decadron to stop the 10 day migraines he has 4 times a year      Workup:  - Neurologic assessment reveals normal neurological exam   - at this point no new or concerning features, red flags or other findings to suggest need for imaging at this time however, if any of these would occur would recommend MRI brain with without contrast      Preventative:  - we discussed headache hygiene and lifestyle factors that may improve headaches  - continue headache preventative supplements including continuing magnesium, adding riboflavin and butterbur  - past/failed:  Propranolol would be contraindicated due to asthma  - cyproheptadine 4-8 mg at night for a few weeks or months during seasonal changes  - future options:  Discussed trying amitriptyline next, verapamil, gabapentin, topiramate, CGRP        Abortive:  - discussed not taking over-the-counter or prescription pain medications more than 3 days per week to prevent medication overuse/rebound headache  - trial of  Prochlorperazine for migraine or nausea abortive  Discussed proper use, possible side effects and risks  -   Sumatriptan 25-100mg tabs - take one at the onset of headache  May repeat one time after 1-2 hours if pain has not resolved  Discussed proper use, possible side effects and risks  - past/failed:  Sumatriptan 25 mg help sometimes another, rizatriptan SE N/V  - future options:  Alternative Triptan, metoclopramide, prochlorperazine, Toradol IM or p o , could consider trial for 5 days of Depakote or dexamethasone 4 prolonged migraine, christen Canales           Patient instructions      Headache/migraine treatment:   Abortive medications (for immediate treatment of a headache):    It is ok to take ibuprofen, acetaminophen or naproxen (Advil, Tylenol,  Aleve, Excedrin) if they help your headaches you should limit these to No more than 3 times a week to avoid medication overuse/rebound headaches       For your more moderate to severe migraines take this medication early   Sumatriptan 25-100mg tabs - take one at the onset of headache  May repeat one time after 1-2 hours if pain has not resolved  (Max 200mg a day and 9 a month)      Trial of prochlorperazine 10 mg as needed for migraine or nausea   - can take with sumatriptan         Over the counter preventive supplements for headaches/migraines   (to take every day to help prevent headaches - not to take at the time of headache):     [x]? ? Magnesium 400mg daily (If any diarrhea or upset stomach, decrease dose  as tolerated)  [x]? ? Riboflavin (Vitamin B2)  400mg daily  - try online   (FYI B2 may make your urine bright/neon yellow)  AND/OR   [x]? ? Herbal medication: Petasites/Butterbur 150 mg daily - try online  (When choosing your Butterbur online or in the store, beware that there are some poor preps containing pyrrolizidine alkaloids (PAs) that can be harmful to the liver  Therefore, do not use butterbur products that are not certified and labeled as PA-free )     Prescription preventive medications for headaches/migraines   (to take every day to help prevent headaches - not to take at the time of headache):  [x]? ? you could try cyprohepatadine 4-8 mg at night for a few weeks or months      Self-Monitoring:  [x]? ? Headache calendar  Each day johnathan a number from 0-10 indicating if there was a headache and how bad it was   This can be used to monitor gradual improvement and is helpful to make medication adjustments  You can do this on paper or there is an KAYODE for a smart phone called "Migraine e Diary"       Lifestyle Recommendations:  [x]? ? SLEEP - Maintain a regular sleep schedule: Adults need at least 7-8 hours of uninterrupted a night   Maintain good sleep hygiene:  Going to bed and waking up at consistent times, avoiding excessive daytime naps, avoiding caffeinated beverages in the evening, avoid excessive stimulation in the evening and generally using bed primarily for sleeping   One hour before bedtime would recommend turning lights down lower, decreasing your activity (may read quietly, listen to music at a low volume)  When you get into bed, should eliminate all technology (no texting, emailing, playing with your phone, iPad or tablet in bed)  [x]?? HYDRATION - Maintain good hydration   Drink  2L of fluid a day (4 typical small water bottles)  [x]? ? DIET - Maintain good nutrition  In particular don't skip meals and try and eat healthy balanced meals regularly  [x]? ? TRIGGERS - Look for other triggers and avoid them: Limit caffeine to 1-2 cups a day or less  Avoid dietary triggers that you have noticed bring on your headaches (this could include aged cheese, peanuts, MSG, aspartame and nitrates)  [x]? ? EXERCISE - physical exercise as we all know is good for you in many ways, and not only is good for your heart, but also is beneficial for your mental health, cognitive health and  chronic pain/headaches  I would encourage at the least 5 days of physical exercise weekly for at least 30 minutes       Education and Follow-up  [x]? ? Please call with any questions or concerns  Of course if any new concerning symptoms go to the emergency department  [x]? ? Follow up 6 months  Yolanda jaffe           CC: We had the pleasure of evaluating Tobi Oliveira in neurological consultation today  Consuelo Peoples a  right handed male who presents today for evaluation of headaches       History obtained from patient as well as available medical record review  History of Present Illness:   Interval history as of 2/26/2021  - no new or concerning symptoms     Headaches and migraines  He feels overall improved, less easily triggered, on average, 2 headaches per week, every 3-4 months cluster of 10 days in a row headaches     Preventative:  Taking supplements    -did not start-  Trial of cyproheptadine 4-8 mg nightly during seasonal changes for prevention      Abortive: sumatriptan 25 mg - works great 90% of time, and may need 2nd      tried cyproheptadine 1 time and had medication side effects because he took it during the day rather than at night as I recommended  Will start new medication     Interval history as of 10/14/2020:  - no new or concerning symptoms      Headaches and migraines   - anywhere from 1 per week, to every day, worse with changing seasons     Preventative:  Trial of headache preventative supplements, continuing magnesium, adding riboflavin and butterbur- tried 2 months  Abortive:   Rizatriptan side effects - nausea and vomiting, transition back to sumatriptan 25 mg usually works, occasionally takes 50      Headaches started at what age? 20years old  How often do the headaches occur?   - as of 7/23/2020: 2-3 times a week migraines for 5 years  What time of the day do the headaches start?  Around noon   How long do the headaches last? All day without meds, may come weeks at a time every other day   Are you ever headache free? Yes     Aura? without aura     Last eye exam: years ago      Where is your headache located and pain quality?   - typically bilateral  - temporal - sharp   What is the intensity of pain? Average: 8/10  Associated symptoms:   [x]? ? Nausea -occasionally      []? ? Vomiting        []? ?Tara Gearing  []? ? Insomnia    []? ? Stiff or sore neck   [x]? ? Problems with concentration  [x]? ? Photophobia     [x]? ?Phonophobia      []? ? Osmophobia  []? ? Blurred vision   [x]? ? Prefer quiet, dark room  [x]? ? Light-headed or dizzy   - occasionally   []? ? Tinnitus   []? ? Hands or feet tingle or feel numb/paresthesias       []? ? Red ear      []? ? Ptosis      []? ? Facial droop  []? ? Lacrimation  []? ? Nasal congestion/rhinorrhea   []? ? Flushing of face  []? ? Change in pupil size     Number of days missed per month because of headaches:  Work (or school) days: 0     Things that make the headache worse? No specific movements, any movement     Headache triggers:   allergy season, asthma exacerbation, dehydration, skipping meals, working on [employer]     Have you seen someone else for headaches or pain? Yes, PCP  Have you had trigger point injection performed and how often? No  Have you had Botox injection performed and how often? No   Have you had epidural injections or transforaminal injections performed? No  Have you ever had any Brain imaging? no     What medications do you take or have you taken for your headaches?    ABORTIVE:    OTC medications have been ineffective      Sumatriptan 25 mg - usually works 90%, but not always      PREVENTIVE:   -   Magnesium 500 mg - started around June 2020  B12      Past  Propranolol 60 mg - contraindicated due to asthma      Alternative therapies used in the past for headaches? no     LIFESTYLE  Sleep   - averages: 7-8 hours  Problems falling asleep?:   No  Problems staying asleep?:  No  - snores sometimes   - talks sometimes      Water: 4-5 per day  Caffeine: 0 per day     Mood:   Denies history of anxiety or depression or other diagnosed mood disorder     The following portions of the patient's history were reviewed and updated as appropriate: allergies, current medications, past family history, past medical history, past social history, past surgical history and problem list      Pertinent family history:  Family history of headaches:  migraine headaches in mother  Any family history of aneurysms - No  Dad depression, bro bipolar     Pertinent social history:  Work:    Lives with wife and 2 kids      Illicit Drugs: denies  Alcohol/tobacco: Denies alcohol use, Denies tobacco use      Past Medical History:     Past Medical History:   Diagnosis Date    Bleeding per rectum 67/41/5342    Eosinophilic esophagitis     GERD (gastroesophageal reflux disease)        Patient Active Problem List   Diagnosis    Benign essential hypertension    Eosinophilic esophagitis    Acquired hypothyroidism    Migraine headache    Positive UGO (antinuclear antibody)    Gastro-esophageal reflux disease with esophagitis    Vertiginous migraine    Left shoulder pain    Overweight    Asthma in adult, moderate persistent, uncomplicated       Medications:      Current Outpatient Medications   Medication Sig Dispense Refill    albuterol (2 5 mg/3 mL) 0 083 % nebulizer solution Take 1 vial (2 5 mg total) by nebulization every 6 (six) hours as needed for wheezing or shortness of breath 1080 mL 0    albuterol (PROVENTIL HFA,VENTOLIN HFA) 90 mcg/act inhaler Inhale 2 puffs every 6 (six) hours as needed for wheezing or shortness of breath 1 Inhaler 5    cyproheptadine (PERIACTIN) 4 mg tablet Take 4-8 mg nightly for migraine prevention  60 tablet 6    multivitamin (THERAGRAN) TABS Take 1 tablet by mouth daily      pantoprazole (PROTONIX) 40 mg tablet Take 1 tablet (40 mg total) by mouth daily 30 tablet 11    SUMAtriptan (IMITREX) 100 mg tablet Take 1 tablet (100 mg total) by mouth once as needed for migraine May repeat in 2 hours if needed  Max 2/24 hours, 3 days per week, 9 tabs/month 9 tablet 6    prochlorperazine (COMPAZINE) 10 mg tablet Take 1 tablet (10 mg total) by mouth every 6 (six) hours as needed for nausea or vomiting 12 tablet 6     No current facility-administered medications for this visit  Allergies:       Allergies   Allergen Reactions    Lac Bovis      Milk allergy -eosinophilic esophagitis    Beef-Derived Products (Food Allergy) Throat Swelling       Family History:     Family History   Problem Relation Age of Onset    Heart disease Family     Migraines Family     Migraines Mother     Hypertension Father     Hip dysplasia Brother        Social History:     Social History     Socioeconomic History    Marital status: /Civil Union     Spouse name: Not on file    Number of children: Not on file    Years of education: Not on file    Highest education level: Not on file   Occupational History    Not on file   Social Needs    Financial resource strain: Not on file    Food insecurity     Worry: Not on file     Inability: Not on file   Ashli Leyva Transportation needs     Medical: Not on file     Non-medical: Not on file   Tobacco Use    Smoking status: Never Smoker    Smokeless tobacco: Never Used    Tobacco comment: non-smoker   Substance and Sexual Activity    Alcohol use: No    Drug use: No    Sexual activity: Yes     Partners: Female   Lifestyle    Physical activity     Days per week: 0 days     Minutes per session: 0 min    Stress: Not on file   Relationships    Social connections     Talks on phone: Not on file     Gets together: Not on file     Attends [employer] service: Not on file     Active member of club or organization: Not on file     Attends meetings of clubs or organizations: Not on file     Relationship status: Not on file    Intimate partner violence     Fear of current or ex partner: Not on file     Emotionally abused: Not on file     Physically abused: Not on file     Forced sexual activity: Not on file   Other Topics Concern    Not on file   Social History Narrative    Not on file         Objective:      Physical Exam:                                                                 Vitals:            Constitutional:    /68 (BP Location: Left arm, Patient Position: Sitting, Cuff Size: Standard)   Pulse 64   Ht 6' 1" (1 854 m)   Wt 85 3 kg (188 lb)   BMI 24 80 kg/m²   BP Readings from Last 3 Encounters:   02/26/21 118/68   10/22/20 128/88   07/27/20 120/78     Pulse Readings from Last 3 Encounters:   02/26/21 64   10/22/20 85   07/27/20 66         Well developed, well nourished, well groomed  No dysmorphic features  HEENT:  Normocephalic atraumatic  See neuro exam   Chest:  Respirations regular and unlabored  Cardiovascular:  Regular rate, no observed significant swelling  Musculoskeletal:  Full range of motion  (see below under neurologic exam for evaluation of motor function and gait)   Skin:  warm and dry, not diaphoretic  No apparent birthmarks or stigmata of neurocutaneous disease     Psychiatric: Normal behavior and appropriate affect        Neurological Examination:     Mental status/cognitive function:   Recent and remote memory intact  Attention span and concentration as well as fund of knowledge are appropriate for age  Normal language and spontaneous speech  Cranial Nerves:  III, IV, VI-Pupils were equal, round  Extraocular movements were full and conjugate   VII-facial expression symmetric  VIII-hearing grossly intact bilaterally   Motor Exam: symmetric bulk throughout  no atrophy, fasciculations or abnormal movements noted  Coordination:  no apparent dysmetria, ataxia or tremor noted  Gait: steady casual gait      Pertinent lab results:   06/05/2020 CMP and CBC unremarkable  TSH normal     Imaging:   No head imaging in system  Review of Systems:   ROS obtained by medical assistant Personally reviewed and updated if indicated  Review of Systems   Constitutional: Negative  Negative for appetite change and fever  HENT: Negative  Negative for hearing loss, tinnitus, trouble swallowing and voice change  Eyes: Negative  Negative for photophobia and pain  Respiratory: Negative  Negative for shortness of breath  Cardiovascular: Negative  Negative for palpitations  Gastrointestinal: Negative  Negative for nausea and vomiting  Endocrine: Negative  Negative for cold intolerance  Genitourinary: Negative  Negative for dysuria, frequency and urgency  Musculoskeletal: Negative  Negative for myalgias and neck pain  Skin: Negative  Negative for rash  Neurological: Positive for headaches (2 per week)  Negative for dizziness, tremors, seizures, syncope, facial asymmetry, speech difficulty, weakness, light-headedness and numbness  Hematological: Negative  Does not bruise/bleed easily  Psychiatric/Behavioral: Negative  Negative for confusion, hallucinations and sleep disturbance      I have spent 17 minutes with Patient  today in which greater than 50% of this time was spent in counseling/coordination of care  I also spent 15 minutes non face to face for this patient the same day         Author:  Britt Nguyen MD 2/26/2021 1:19 PM

## 2021-02-26 NOTE — PATIENT INSTRUCTIONS
Headache/migraine treatment:   Abortive medications (for immediate treatment of a headache): It is ok to take ibuprofen, acetaminophen or naproxen (Advil, Tylenol,  Aleve, Excedrin) if they help your headaches you should limit these to No more than 3 times a week to avoid medication overuse/rebound headaches       For your more moderate to severe migraines take this medication early   Sumatriptan 25-100mg tabs - take one at the onset of headache  May repeat one time after 1-2 hours if pain has not resolved  (Max 200mg a day and 9 a month)      Trial of prochlorperazine 10 mg as needed for migraine or nausea   - can take with sumatriptan         Over the counter preventive supplements for headaches/migraines   (to take every day to help prevent headaches - not to take at the time of headache):     [x]? ? Magnesium 400mg daily (If any diarrhea or upset stomach, decrease dose  as tolerated)  [x]? ? Riboflavin (Vitamin B2)  400mg daily  - try online   (FYI B2 may make your urine bright/neon yellow)  AND/OR   [x]? ? Herbal medication: Petasites/Butterbur 150 mg daily - try online  (When choosing your Butterbur online or in the store, beware that there are some poor preps containing pyrrolizidine alkaloids (PAs) that can be harmful to the liver  Therefore, do not use butterbur products that are not certified and labeled as PA-free )     Prescription preventive medications for headaches/migraines   (to take every day to help prevent headaches - not to take at the time of headache):  [x]? ? you could try cyprohepatadine 4-8 mg at night for a few weeks or months      Self-Monitoring:  [x]? ? Headache calendar  Each day johnathan a number from 0-10 indicating if there was a headache and how bad it was   This can be used to monitor gradual improvement and is helpful to make medication adjustments  You can do this on paper or there is an KAYODE for a smart phone called "Migraine e Diary"       Lifestyle Recommendations:  [x]? ? SLEEP - Maintain a regular sleep schedule: Adults need at least 7-8 hours of uninterrupted a night  Maintain good sleep hygiene:  Going to bed and waking up at consistent times, avoiding excessive daytime naps, avoiding caffeinated beverages in the evening, avoid excessive stimulation in the evening and generally using bed primarily for sleeping   One hour before bedtime would recommend turning lights down lower, decreasing your activity (may read quietly, listen to music at a low volume)  When you get into bed, should eliminate all technology (no texting, emailing, playing with your phone, iPad or tablet in bed)  [x]?? HYDRATION - Maintain good hydration   Drink  2L of fluid a day (4 typical small water bottles)  [x]? ? DIET - Maintain good nutrition  In particular don't skip meals and try and eat healthy balanced meals regularly  [x]? ? TRIGGERS - Look for other triggers and avoid them: Limit caffeine to 1-2 cups a day or less  Avoid dietary triggers that you have noticed bring on your headaches (this could include aged cheese, peanuts, MSG, aspartame and nitrates)  [x]? ? EXERCISE - physical exercise as we all know is good for you in many ways, and not only is good for your heart, but also is beneficial for your mental health, cognitive health and  chronic pain/headaches  I would encourage at the least 5 days of physical exercise weekly for at least 30 minutes       Education and Follow-up  [x]? ? Please call with any questions or concerns  Of course if any new concerning symptoms go to the emergency department  [x]? ? Follow up 6 months  Yolanda ajffe

## 2021-03-10 DIAGNOSIS — Z23 ENCOUNTER FOR IMMUNIZATION: ICD-10-CM

## 2021-03-17 ENCOUNTER — IMMUNIZATIONS (OUTPATIENT)
Dept: FAMILY MEDICINE CLINIC | Facility: HOSPITAL | Age: 31
End: 2021-03-17

## 2021-03-17 DIAGNOSIS — Z23 ENCOUNTER FOR IMMUNIZATION: Primary | ICD-10-CM

## 2021-03-17 PROCEDURE — 0001A SARS-COV-2 / COVID-19 MRNA VACCINE (PFIZER-BIONTECH) 30 MCG: CPT

## 2021-03-17 PROCEDURE — 91300 SARS-COV-2 / COVID-19 MRNA VACCINE (PFIZER-BIONTECH) 30 MCG: CPT

## 2021-04-07 ENCOUNTER — IMMUNIZATIONS (OUTPATIENT)
Dept: FAMILY MEDICINE CLINIC | Facility: HOSPITAL | Age: 31
End: 2021-04-07

## 2021-04-07 DIAGNOSIS — Z23 ENCOUNTER FOR IMMUNIZATION: Primary | ICD-10-CM

## 2021-04-07 PROCEDURE — 91300 SARS-COV-2 / COVID-19 MRNA VACCINE (PFIZER-BIONTECH) 30 MCG: CPT

## 2021-04-07 PROCEDURE — 0002A SARS-COV-2 / COVID-19 MRNA VACCINE (PFIZER-BIONTECH) 30 MCG: CPT

## 2021-04-27 ENCOUNTER — PREP FOR PROCEDURE (OUTPATIENT)
Dept: UROLOGY | Facility: CLINIC | Age: 31
End: 2021-04-27

## 2021-04-27 ENCOUNTER — TELEPHONE (OUTPATIENT)
Dept: UROLOGY | Facility: CLINIC | Age: 31
End: 2021-04-27

## 2021-04-27 ENCOUNTER — OFFICE VISIT (OUTPATIENT)
Dept: UROLOGY | Facility: CLINIC | Age: 31
End: 2021-04-27
Payer: COMMERCIAL

## 2021-04-27 VITALS
SYSTOLIC BLOOD PRESSURE: 132 MMHG | HEART RATE: 64 BPM | DIASTOLIC BLOOD PRESSURE: 82 MMHG | BODY MASS INDEX: 25.05 KG/M2 | HEIGHT: 73 IN | WEIGHT: 189 LBS

## 2021-04-27 DIAGNOSIS — Z30.2 ENCOUNTER FOR STERILIZATION: Primary | ICD-10-CM

## 2021-04-27 PROCEDURE — 1036F TOBACCO NON-USER: CPT | Performed by: UROLOGY

## 2021-04-27 PROCEDURE — 99204 OFFICE O/P NEW MOD 45 MIN: CPT | Performed by: UROLOGY

## 2021-04-27 PROCEDURE — 3008F BODY MASS INDEX DOCD: CPT | Performed by: UROLOGY

## 2021-04-27 NOTE — TELEPHONE ENCOUNTER
Spoke w patient and he is sched for 5/14 at the [facility] w [address]   He is aware he needs a  and the hospital will contact him day prior w time of arrival  No PATs are needed and he denies being on any blood thinners  I will email him a surgical packet and he will contact us w any questions or concerns

## 2021-04-27 NOTE — PROGRESS NOTES
Referring Physician: Leobardo Peterson MD  A copy of this consultation note was communicated to the referring physician  Diagnoses and all orders for this visit:    Encounter for sterilization  -     Case request operating room: VASECTOMY; Standing  -     Case request operating room: VASECTOMY    Other orders  -     Diet NPO; Sips with meds; Standing  -     Place sequential compression device; Standing  -     ceFAZolin (ANCEF) 2,000 mg in dextrose 5 % 100 mL IVPB            Assessment and plan:       We had a long discussion regarding the options for birth control  I told the patient that vasectomy is considered to be a permanent surgical sterilization procedure  We spoke about other options including the possibility of vasectomy reversal at a later time  He understands that vasectomy confers no immunity to STDs  I also told him that according to our present knowledge, there is no causal relationship between vasectomy and subsequent development of prostate cancer  We spoke about the potential complications  The most common one in the short term is scrotal hematoma and infection, which rarely requires re-operation  Additionally, he can react to the anesthetic, develop scrotal swelling, have pain or skin bruising  We spoke about post procedure care to try to minimize this complication  I also asked him to refrain from aspirin products and alcohol prior to the procedure  The long-term complications include but are not limited to vasectomy failure by recanalization, chronic epididymal discomfort, pain, among other possibilities  Patient had a prior history of acute torsion and orchiopexy  His physical exam demonstrates some physiologic hydrocele and fluid around the spermatic cord an each vas deferens is difficult to palpate in the awake office setting  As such I recommended pursuing his vasectomy under sedation in ambulatory surgery center and he is amenable to this        Informed consent was obtained and surgical be scheduled in the near convenience      Chief Complaint     Desire for vasectomy      History of Present Illness     Joaquin Durbin is a 26 y o  male referred for evaluation of vasectomy  He has 2 biological children  He states that he and his partner have come to the mutual decision they do not desire any additional children  does have a prior history of testicular torsion successfully treated with orchiopexy performed in childhood  Also of note the patient has a history of what sounds like a urethral stricture or perhaps meatal stenosis and underwent dilation as a child  He is asymptomatic from a genitourinary standpoint at the present time  Detailed Urologic History     - please refer to HPI    Review of Systems     Review of Systems   Constitutional: Negative for activity change and fatigue  HENT: Negative for congestion  Eyes: Negative for visual disturbance  Respiratory: Negative for shortness of breath and wheezing  Cardiovascular: Negative for chest pain and leg swelling  Gastrointestinal: Negative for abdominal pain  Endocrine: Negative for polyuria  Genitourinary: Negative for dysuria, flank pain, hematuria and urgency  Musculoskeletal: Negative for back pain  Allergic/Immunologic: Negative for immunocompromised state  Neurological: Negative for dizziness and numbness  Psychiatric/Behavioral: Negative for dysphoric mood  All other systems reviewed and are negative  Allergies     Allergies   Allergen Reactions    Lac Bovis      Milk allergy -eosinophilic esophagitis    Beef-Derived Products - Food Allergy Throat Swelling       Physical Exam     Physical Exam   Constitutional: He is oriented to person, place, and time  He appears well-developed and well-nourished  No distress  HENT:   Head: Normocephalic and atraumatic  Eyes: EOM are normal    Neck: Normal range of motion     Cardiovascular:   Negative lower extremity edema Pulmonary/Chest: Effort normal and breath sounds normal    Abdominal: Soft  Genitourinary:   Genitourinary Comments: Vas deferens are palpable bilaterally  Musculoskeletal: Normal range of motion  Neurological: He is alert and oriented to person, place, and time  Skin: Skin is warm  Psychiatric: He has a normal mood and affect  His behavior is normal          Vital Signs  There were no vitals filed for this visit  Current Medications       Current Outpatient Medications:     albuterol (2 5 mg/3 mL) 0 083 % nebulizer solution, Take 1 vial (2 5 mg total) by nebulization every 6 (six) hours as needed for wheezing or shortness of breath, Disp: 1080 mL, Rfl: 0    albuterol (PROVENTIL HFA,VENTOLIN HFA) 90 mcg/act inhaler, Inhale 2 puffs every 6 (six) hours as needed for wheezing or shortness of breath, Disp: 1 Inhaler, Rfl: 5    cyproheptadine (PERIACTIN) 4 mg tablet, Take 4-8 mg nightly for migraine prevention  , Disp: 60 tablet, Rfl: 6    multivitamin (THERAGRAN) TABS, Take 1 tablet by mouth daily, Disp: , Rfl:     pantoprazole (PROTONIX) 40 mg tablet, Take 1 tablet (40 mg total) by mouth daily, Disp: 30 tablet, Rfl: 11    prochlorperazine (COMPAZINE) 10 mg tablet, Take 1 tablet (10 mg total) by mouth every 6 (six) hours as needed for nausea or vomiting, Disp: 12 tablet, Rfl: 6    SUMAtriptan (IMITREX) 100 mg tablet, Take 1 tablet (100 mg total) by mouth once as needed for migraine May repeat in 2 hours if needed   Max 2/24 hours, 3 days per week, 9 tabs/month, Disp: 9 tablet, Rfl: 6      Active Problems     Patient Active Problem List   Diagnosis    Benign essential hypertension    Eosinophilic esophagitis    Acquired hypothyroidism    Migraine headache    Positive UGO (antinuclear antibody)    Gastro-esophageal reflux disease with esophagitis    Vertiginous migraine    Left shoulder pain    Overweight    Asthma in adult, moderate persistent, uncomplicated    Encounter for sterilization Past Medical History     Past Medical History:   Diagnosis Date    Bleeding per rectum 34/31/3726    Eosinophilic esophagitis     GERD (gastroesophageal reflux disease)          Surgical History     Past Surgical History:   Procedure Laterality Date    CYSTOSCOPY      for urethral stricture    ESOPHAGOGASTRODUODENOSCOPY      [place] ESOPHAGOGASTRODUODENOSCOPY TRANSORAL DIAGNOSTIC N/A 3/11/2019    Procedure: ESOPHAGOGASTRODUODENOSCOPY (EGD); Surgeon: Jessica Stephen DO;  Location: [facility]; Service: Gastroenterology    TONSILLECTOMY           Family History     Family History   Problem Relation Age of Onset    Heart disease Family     Migraines Family    [facility] Migraines Mother     Hypertension Father     Hip dysplasia Brother          Social History     Social History     Social History     Tobacco Use   Smoking Status Never Smoker   Smokeless Tobacco Never Used   Tobacco Comment    non-smoker       Portions of the record may have been created with voice recognition software  Occasional wrong word or "sound a like" substitutions may have occurred due to the inherent limitations of voice recognition software  Read the chart carefully and recognize, using context, where substitutions have occurred

## 2021-04-27 NOTE — H&P (VIEW-ONLY)
Referring Physician: Dexter Baumann MD  A copy of this consultation note was communicated to the referring physician  Diagnoses and all orders for this visit:    Encounter for sterilization  -     Case request operating room: VASECTOMY; Standing  -     Case request operating room: VASECTOMY    Other orders  -     Diet NPO; Sips with meds; Standing  -     Place sequential compression device; Standing  -     ceFAZolin (ANCEF) 2,000 mg in dextrose 5 % 100 mL IVPB            Assessment and plan:       We had a long discussion regarding the options for birth control  I told the patient that vasectomy is considered to be a permanent surgical sterilization procedure  We spoke about other options including the possibility of vasectomy reversal at a later time  He understands that vasectomy confers no immunity to STDs  I also told him that according to our present knowledge, there is no causal relationship between vasectomy and subsequent development of prostate cancer  We spoke about the potential complications  The most common one in the short term is scrotal hematoma and infection, which rarely requires re-operation  Additionally, he can react to the anesthetic, develop scrotal swelling, have pain or skin bruising  We spoke about post procedure care to try to minimize this complication  I also asked him to refrain from aspirin products and alcohol prior to the procedure  The long-term complications include but are not limited to vasectomy failure by recanalization, chronic epididymal discomfort, pain, among other possibilities  Patient had a prior history of acute torsion and orchiopexy  His physical exam demonstrates some physiologic hydrocele and fluid around the spermatic cord an each vas deferens is difficult to palpate in the awake office setting  As such I recommended pursuing his vasectomy under sedation in ambulatory surgery center and he is amenable to this        Informed consent was obtained and surgical be scheduled in the near convenience      Chief Complaint     Desire for vasectomy      History of Present Illness     Rima Mckee is a 26 y o  male referred for evaluation of vasectomy  He has 2 biological children  He states that he and his partner have come to the mutual decision they do not desire any additional children  does have a prior history of testicular torsion successfully treated with orchiopexy performed in childhood  Also of note the patient has a history of what sounds like a urethral stricture or perhaps meatal stenosis and underwent dilation as a child  He is asymptomatic from a genitourinary standpoint at the present time  Detailed Urologic History     - please refer to HPI    Review of Systems     Review of Systems   Constitutional: Negative for activity change and fatigue  HENT: Negative for congestion  Eyes: Negative for visual disturbance  Respiratory: Negative for shortness of breath and wheezing  Cardiovascular: Negative for chest pain and leg swelling  Gastrointestinal: Negative for abdominal pain  Endocrine: Negative for polyuria  Genitourinary: Negative for dysuria, flank pain, hematuria and urgency  Musculoskeletal: Negative for back pain  Allergic/Immunologic: Negative for immunocompromised state  Neurological: Negative for dizziness and numbness  Psychiatric/Behavioral: Negative for dysphoric mood  All other systems reviewed and are negative  Allergies     Allergies   Allergen Reactions    Lac Bovis      Milk allergy -eosinophilic esophagitis    Beef-Derived Products - Food Allergy Throat Swelling       Physical Exam     Physical Exam   Constitutional: He is oriented to person, place, and time  He appears well-developed and well-nourished  No distress  HENT:   Head: Normocephalic and atraumatic  Eyes: EOM are normal    Neck: Normal range of motion     Cardiovascular:   Negative lower extremity edema Pulmonary/Chest: Effort normal and breath sounds normal    Abdominal: Soft  Genitourinary:   Genitourinary Comments: Vas deferens are palpable bilaterally  Musculoskeletal: Normal range of motion  Neurological: He is alert and oriented to person, place, and time  Skin: Skin is warm  Psychiatric: He has a normal mood and affect  His behavior is normal          Vital Signs  There were no vitals filed for this visit  Current Medications       Current Outpatient Medications:     albuterol (2 5 mg/3 mL) 0 083 % nebulizer solution, Take 1 vial (2 5 mg total) by nebulization every 6 (six) hours as needed for wheezing or shortness of breath, Disp: 1080 mL, Rfl: 0    albuterol (PROVENTIL HFA,VENTOLIN HFA) 90 mcg/act inhaler, Inhale 2 puffs every 6 (six) hours as needed for wheezing or shortness of breath, Disp: 1 Inhaler, Rfl: 5    cyproheptadine (PERIACTIN) 4 mg tablet, Take 4-8 mg nightly for migraine prevention  , Disp: 60 tablet, Rfl: 6    multivitamin (THERAGRAN) TABS, Take 1 tablet by mouth daily, Disp: , Rfl:     pantoprazole (PROTONIX) 40 mg tablet, Take 1 tablet (40 mg total) by mouth daily, Disp: 30 tablet, Rfl: 11    prochlorperazine (COMPAZINE) 10 mg tablet, Take 1 tablet (10 mg total) by mouth every 6 (six) hours as needed for nausea or vomiting, Disp: 12 tablet, Rfl: 6    SUMAtriptan (IMITREX) 100 mg tablet, Take 1 tablet (100 mg total) by mouth once as needed for migraine May repeat in 2 hours if needed   Max 2/24 hours, 3 days per week, 9 tabs/month, Disp: 9 tablet, Rfl: 6      Active Problems     Patient Active Problem List   Diagnosis    Benign essential hypertension    Eosinophilic esophagitis    Acquired hypothyroidism    Migraine headache    Positive UGO (antinuclear antibody)    Gastro-esophageal reflux disease with esophagitis    Vertiginous migraine    Left shoulder pain    Overweight    Asthma in adult, moderate persistent, uncomplicated    Encounter for sterilization Past Medical History     Past Medical History:   Diagnosis Date    Bleeding per rectum 90/71/1510    Eosinophilic esophagitis     GERD (gastroesophageal reflux disease)          Surgical History     Past Surgical History:   Procedure Laterality Date    CYSTOSCOPY      for urethral stricture    ESOPHAGOGASTRODUODENOSCOPY      [place] ESOPHAGOGASTRODUODENOSCOPY TRANSORAL DIAGNOSTIC N/A 3/11/2019    Procedure: ESOPHAGOGASTRODUODENOSCOPY (EGD); Surgeon: Shanta Chappell DO;  Location: [facility]; Service: Gastroenterology    TONSILLECTOMY           Family History     Family History   Problem Relation Age of Onset    Heart disease Family     Migraines Family    [facility] Migraines Mother     Hypertension Father     Hip dysplasia Brother          Social History     Social History     Social History     Tobacco Use   Smoking Status Never Smoker   Smokeless Tobacco Never Used   Tobacco Comment    non-smoker       Portions of the record may have been created with voice recognition software  Occasional wrong word or "sound a like" substitutions may have occurred due to the inherent limitations of voice recognition software  Read the chart carefully and recognize, using context, where substitutions have occurred

## 2021-04-30 ENCOUNTER — ANESTHESIA EVENT (OUTPATIENT)
Dept: PERIOP | Facility: AMBULARY SURGERY CENTER | Age: 31
End: 2021-04-30
Payer: COMMERCIAL

## 2021-05-12 RX ORDER — CHOLECALCIFEROL (VITAMIN D3) 125 MCG
500 CAPSULE ORAL DAILY
COMMUNITY
End: 2022-08-09

## 2021-05-12 NOTE — PRE-PROCEDURE INSTRUCTIONS
Pre-Surgery Instructions:   Medication Instructions    albuterol (2 5 mg/3 mL) 0 083 % nebulizer solution Uses only as needed- instructed pt that if needed morning of surgery may use    albuterol (PROVENTIL HFA,VENTOLIN HFA) 90 mcg/act inhaler Uses as needed- instructed pt that if needed morning of surgery -pt may use    multivitamin (THERAGRAN) TABS Pt reports holding since 5/9/21 as per instructions from Dr Sirena Marie office    pantoprazole (PROTONIX) 40 mg tablet Instructed to take morning of surgery with sip of water    SUMAtriptan (IMITREX) 100 mg tablet May use up to day before surgery if needed    vitamin B-12 (VITAMIN B-12) 500 mcg tablet Pt reports holding since 5/9/21 as per instructions from Dr Sirena Marie office     Reviewed all medications and instructions for DOS  Reviewed all showering instructions and COVID visitation policy  Pt aware that [facility] [place] is location for [facility], instructed that pt pre op nurse will call on 5/13/21 to give specific instructions for DOS  Pt instructed to bring photo ID and insurance card for DOS, remove all jewelry and  NO valuables for DOS  Pt instructed to use only Tylenol between now 5/12/21and DOS, NO NSAID products  Pt informed transport is needed for [facility] due to receiving anesthesia  Pt verbalized understanding of all instructions given and reviewed for DOS  My Surgical Experience    The following information was developed to assist you to prepare for your operation  What do I need to do before coming to the hospital?   Arrange for a responsible person to drive you to and from the hospital    Arrange care for your children at home  Children are not allowed in the recovery areas of the hospital   Plan to wear clothing that is easy to put on and take off  If you are having shoulder surgery, wear a shirt that buttons or zippers in the front  Bathing  o Shower the evening before and the morning of your surgery with an antibacterial soap   Please refer to the Pre Op Showering Instructions for Surgery Patients Sheet   o Remove nail polish and all body piercing jewelry  o Do not shave any body part for at least 24 hours before surgery-this includes face, arms, legs and upper body  Food  o Nothing to eat or drink after midnight the night before your surgery  This includes candy and chewing gum  o Exception: If your surgery is after 12:00pm (noon), you may have clear liquids such as 7-Up®, ginger ale, apple or cranberry juice, Jell-O®, water, or clear broth until 8:00 am  o Do not drink milk or juice with pulp on the morning before surgery  o Do not drink alcohol 24 hours before surgery  Medicine  o Follow instructions you received from your surgeon about which medicines you may take on the day of surgery  o If instructed to take medicine on the morning of surgery, take pills with just a small sip of water  Call your prescribing doctor for specific infroamtion on what to do if you take insulin    What should I bring to the hospital?    Bring:  Patience Blaine or a walker, if you have them, for foot or knee surgery   A list of the daily medicines, vitamins, minerals, herbals and nutritional supplements you take  Include the dosages of medicines and the time you take them each day   Glasses, dentures or hearing aids   Minimal clothing; you will be wearing hospital sleepwear   Photo ID; required to verify your identity   If you have a Living Will or Power of , bring a copy of the documents   If you have an ostomy, bring an extra pouch and any supplies you use    Do not bring   Medicines or inhalers   Money, valuables or jewelry    What other information should I know about the day of surgery?  Notify your surgeons if you develop a cold, sore throat, cough, fever, rash or any other illness     Report to the Ambulatory Surgical/Same Day Surgery Unit   You will be instructed to stop at Registration only if you have not been pre-registered   Inform your  fi they do not stay that they will be asked by the staff to leave a phone number where they can be reached   Be available to be reached before surgery  In the event the operating room schedule changes, you may be asked to come in earlier or later than expected    *It is important to tell your doctor and others involved in your health care if you are taking or have been taking any non-prescription drugs, vitamins, minerals, herbals or other nutritional supplements   Any of these may interact with some food or medicines and cause a reaction

## 2021-05-14 ENCOUNTER — HOSPITAL ENCOUNTER (OUTPATIENT)
Facility: AMBULARY SURGERY CENTER | Age: 31
Setting detail: OUTPATIENT SURGERY
Discharge: HOME | End: 2021-05-14
Attending: UROLOGY | Admitting: UROLOGY
Payer: COMMERCIAL

## 2021-05-14 ENCOUNTER — ANESTHESIA (OUTPATIENT)
Dept: PERIOP | Facility: AMBULARY SURGERY CENTER | Age: 31
End: 2021-05-14
Payer: COMMERCIAL

## 2021-05-14 VITALS
SYSTOLIC BLOOD PRESSURE: 114 MMHG | TEMPERATURE: 97.6 F | HEART RATE: 66 BPM | HEIGHT: 73 IN | WEIGHT: 183 LBS | OXYGEN SATURATION: 97 % | RESPIRATION RATE: 16 BRPM | DIASTOLIC BLOOD PRESSURE: 64 MMHG | BODY MASS INDEX: 24.25 KG/M2

## 2021-05-14 DIAGNOSIS — Z30.2 ENCOUNTER FOR STERILIZATION: ICD-10-CM

## 2021-05-14 PROCEDURE — 88302 TISSUE EXAM BY PATHOLOGIST: CPT | Performed by: PATHOLOGY

## 2021-05-14 PROCEDURE — 55250 REMOVAL OF SPERM DUCT(S): CPT | Performed by: UROLOGY

## 2021-05-14 RX ORDER — ONDANSETRON 2 MG/ML
4 INJECTION INTRAMUSCULAR; INTRAVENOUS ONCE AS NEEDED
Status: DISCONTINUED | OUTPATIENT
Start: 2021-05-14 | End: 2021-05-14 | Stop reason: HOSPADM

## 2021-05-14 RX ORDER — KETAMINE HYDROCHLORIDE 50 MG/ML
INJECTION, SOLUTION, CONCENTRATE INTRAMUSCULAR; INTRAVENOUS AS NEEDED
Status: DISCONTINUED | OUTPATIENT
Start: 2021-05-14 | End: 2021-05-14

## 2021-05-14 RX ORDER — MAGNESIUM HYDROXIDE 1200 MG/15ML
LIQUID ORAL AS NEEDED
Status: DISCONTINUED | OUTPATIENT
Start: 2021-05-14 | End: 2021-05-14 | Stop reason: HOSPADM

## 2021-05-14 RX ORDER — CEFAZOLIN SODIUM 2 G/50ML
2000 SOLUTION INTRAVENOUS ONCE
Status: COMPLETED | OUTPATIENT
Start: 2021-05-14 | End: 2021-05-14

## 2021-05-14 RX ORDER — SODIUM CHLORIDE, SODIUM LACTATE, POTASSIUM CHLORIDE, CALCIUM CHLORIDE 600; 310; 30; 20 MG/100ML; MG/100ML; MG/100ML; MG/100ML
50 INJECTION, SOLUTION INTRAVENOUS CONTINUOUS
Status: DISCONTINUED | OUTPATIENT
Start: 2021-05-14 | End: 2021-05-14 | Stop reason: HOSPADM

## 2021-05-14 RX ORDER — PROPOFOL 10 MG/ML
INJECTION, EMULSION INTRAVENOUS CONTINUOUS PRN
Status: DISCONTINUED | OUTPATIENT
Start: 2021-05-14 | End: 2021-05-14

## 2021-05-14 RX ORDER — KETOROLAC TROMETHAMINE 30 MG/ML
INJECTION, SOLUTION INTRAMUSCULAR; INTRAVENOUS AS NEEDED
Status: DISCONTINUED | OUTPATIENT
Start: 2021-05-14 | End: 2021-05-14

## 2021-05-14 RX ORDER — PROMETHAZINE HYDROCHLORIDE 25 MG/ML
12.5 INJECTION, SOLUTION INTRAMUSCULAR; INTRAVENOUS ONCE AS NEEDED
Status: DISCONTINUED | OUTPATIENT
Start: 2021-05-14 | End: 2021-05-14 | Stop reason: HOSPADM

## 2021-05-14 RX ORDER — FENTANYL CITRATE 50 UG/ML
INJECTION, SOLUTION INTRAMUSCULAR; INTRAVENOUS AS NEEDED
Status: DISCONTINUED | OUTPATIENT
Start: 2021-05-14 | End: 2021-05-14

## 2021-05-14 RX ORDER — BUPIVACAINE HYDROCHLORIDE 5 MG/ML
INJECTION, SOLUTION PERINEURAL AS NEEDED
Status: DISCONTINUED | OUTPATIENT
Start: 2021-05-14 | End: 2021-05-14 | Stop reason: HOSPADM

## 2021-05-14 RX ORDER — MIDAZOLAM HYDROCHLORIDE 2 MG/2ML
INJECTION, SOLUTION INTRAMUSCULAR; INTRAVENOUS AS NEEDED
Status: DISCONTINUED | OUTPATIENT
Start: 2021-05-14 | End: 2021-05-14

## 2021-05-14 RX ORDER — LIDOCAINE HYDROCHLORIDE 10 MG/ML
INJECTION, SOLUTION EPIDURAL; INFILTRATION; INTRACAUDAL; PERINEURAL AS NEEDED
Status: DISCONTINUED | OUTPATIENT
Start: 2021-05-14 | End: 2021-05-14

## 2021-05-14 RX ORDER — FENTANYL CITRATE/PF 50 MCG/ML
25 SYRINGE (ML) INJECTION
Status: DISCONTINUED | OUTPATIENT
Start: 2021-05-14 | End: 2021-05-14 | Stop reason: HOSPADM

## 2021-05-14 RX ORDER — ONDANSETRON 2 MG/ML
INJECTION INTRAMUSCULAR; INTRAVENOUS AS NEEDED
Status: DISCONTINUED | OUTPATIENT
Start: 2021-05-14 | End: 2021-05-14

## 2021-05-14 RX ORDER — DEXAMETHASONE SODIUM PHOSPHATE 4 MG/ML
INJECTION, SOLUTION INTRA-ARTICULAR; INTRALESIONAL; INTRAMUSCULAR; INTRAVENOUS; SOFT TISSUE AS NEEDED
Status: DISCONTINUED | OUTPATIENT
Start: 2021-05-14 | End: 2021-05-14

## 2021-05-14 RX ORDER — OXYCODONE HYDROCHLORIDE 5 MG/1
5 TABLET ORAL EVERY 4 HOURS PRN
Status: DISCONTINUED | OUTPATIENT
Start: 2021-05-14 | End: 2021-05-14 | Stop reason: HOSPADM

## 2021-05-14 RX ADMIN — ONDANSETRON 4 MG: 2 INJECTION INTRAMUSCULAR; INTRAVENOUS at 14:11

## 2021-05-14 RX ADMIN — FENTANYL CITRATE 50 MCG: 50 INJECTION, SOLUTION INTRAMUSCULAR; INTRAVENOUS at 14:22

## 2021-05-14 RX ADMIN — SODIUM CHLORIDE, SODIUM LACTATE, POTASSIUM CHLORIDE, AND CALCIUM CHLORIDE: .6; .31; .03; .02 INJECTION, SOLUTION INTRAVENOUS at 13:44

## 2021-05-14 RX ADMIN — CEFAZOLIN SODIUM 2000 MG: 2 SOLUTION INTRAVENOUS at 14:13

## 2021-05-14 RX ADMIN — LIDOCAINE HYDROCHLORIDE 50 MG: 10 INJECTION, SOLUTION EPIDURAL; INFILTRATION; INTRACAUDAL at 14:22

## 2021-05-14 RX ADMIN — DEXAMETHASONE SODIUM PHOSPHATE 4 MG: 4 INJECTION INTRA-ARTICULAR; INTRALESIONAL; INTRAMUSCULAR; INTRAVENOUS; SOFT TISSUE at 14:31

## 2021-05-14 RX ADMIN — KETAMINE HYDROCHLORIDE 25 MG: 50 INJECTION INTRAMUSCULAR; INTRAVENOUS at 14:24

## 2021-05-14 RX ADMIN — PROPOFOL 130 MCG/KG/MIN: 10 INJECTION, EMULSION INTRAVENOUS at 14:22

## 2021-05-14 RX ADMIN — KETOROLAC TROMETHAMINE 30 MG: 30 INJECTION, SOLUTION INTRAMUSCULAR at 14:41

## 2021-05-14 RX ADMIN — MIDAZOLAM HYDROCHLORIDE 2 MG: 1 INJECTION, SOLUTION INTRAMUSCULAR; INTRAVENOUS at 14:11

## 2021-05-14 NOTE — ANESTHESIA POSTPROCEDURE EVALUATION
Post-Op Assessment Note    CV Status:  Stable  Pain Score: 0    Pain management: adequate     Mental Status:  Alert and awake   Hydration Status:  Euvolemic   PONV Controlled:  Controlled   Airway Patency:  Patent      Post Op Vitals Reviewed: Yes      Staff: CRNA         No complications documented      BP   106/58   Temp  98 3   Pulse  61   Resp   22   SpO2   98%

## 2021-05-14 NOTE — OP NOTE
OPERATIVE REPORT  PATIENT NAME: Mikel Connelly    :  1990  MRN: [id number]  Pt Location: [facility]    SURGERY DATE: 2021    Surgeon(s) and Role:     * Tiago Goodman MD - Primary    Preop Diagnosis:  Encounter for sterilization [Z30 2]    Post-Op Diagnosis Codes:     * Encounter for sterilization [Z30 2]    Procedure(s) (LRB):  VASECTOMY (Bilateral)    Specimen(s):  ID Type Source Tests Collected by Time Destination   1 : Portion of right vas deferens Tissue Soft Tissue, Other TISSUE EXAM Tiago Goodman MD 2021 142    2 : Portion of left vas deferens Tissue Soft Tissue, Other TISSUE EXAM Tiago Goodman MD 2021 1420    Procedures      Procedure Details     The risks and benefits of the procedure were discussed at the pre-procedure consultation, and written, informed consent obtained  IV sedation was administered by Anesthesia  The scrotum was palpated with both testes normal in size and position, no masses palpitated  The scrotum was cleansed with warm Betadine and draped in the usual sterile manner  A vasal sheath block was performed on both the left and right vas  After adequate anesthesia was established, a small perforation was made in the skin and the left vas was isolated with the ring forceps, dissected free and delivered through the skin perforation  The left vas was divided, approximately 1 5 cm portion removed, and each end of the vas was cauterized and suture ligated  The ends of the vas were replaced in the scrotum through the puncture site  The right vas was then isolated, divided, cauterized and ligated in a similar fashion  Midportions removed were sent to pathology to confirm  Any bleeding was controlled with electrocautery  The puncture site was dry when the procedure was completed  After discussion with the patient, the puncture site was sutured using single 5-0 chromic suture in figure 8 fashion  Specimen:   1    Right vas deferens  2  Left vas deferens    Condition: Stable    Complications: none    Plan:        He did very well with the procedure today  Postprocedural instructions were provided  He will follow-up in 2-3 weeks for postoperative assessment  At that time we will coordinate his postprocedural semen analyses at 6 weeks, and again it 8 weeks after the procedure  He was instructed to call my office 2 weeks after his second specimen is submitted to review the results by phone  He was instructed to continue his current methods of contraception until that time            SIGNATURE: Victorino Hastings MD  DATE: May 14, 2021  TIME: 2:22 PM

## 2021-05-14 NOTE — ANESTHESIA PREPROCEDURE EVALUATION
Procedure:  VASECTOMY (Bilateral Scrotum)    Relevant Problems   CARDIO   (+) Benign essential hypertension   (+) Migraine headache      ENDO   (+) Acquired hypothyroidism      PULMONARY   (+) Asthma in adult, moderate persistent, uncomplicated (mild and well controlled   no recent exacerbation )      Other   (+) Eosinophilic esophagitis   (+) Positive UGO (antinuclear antibody)        Physical Exam    Airway    Mallampati score: II  TM Distance: >3 FB  Neck ROM: full     Dental       Cardiovascular  Cardiovascular exam normal    Pulmonary  Pulmonary exam normal     Other Findings        Anesthesia Plan  ASA Score- 2     Anesthesia Type- IV sedation with anesthesia with ASA Monitors  Additional Monitors:   Airway Plan:           Plan Factors-Exercise tolerance (METS): >4 METS  Chart reviewed  Existing labs reviewed  Patient is not a current smoker  Patient not instructed to abstain from smoking on day of procedure  Patient did not smoke on day of surgery  Induction- intravenous  Postoperative Plan- Plan for postoperative opioid use  Informed Consent- Anesthetic plan and risks discussed with patient  I personally reviewed this patient with the CRNA  Discussed and agreed on the Anesthesia Plan with the CRNA  Maria Alejandra Villalobos           No results found for: HGBA1C    Lab Results   Component Value Date     05/05/2017    K 4 7 06/05/2020     06/05/2020    CO2 29 06/05/2020    BUN 8 06/05/2020    CREATININE 0 83 06/05/2020    CALCIUM 9 7 06/05/2020    AST 17 06/05/2020    ALT 12 06/05/2020    ALKPHOS 94 06/05/2020    PROT 7 1 05/05/2017    BILITOT 1 3 (H) 05/05/2017       Lab Results   Component Value Date    WBC 7 3 06/05/2020    HGB 15 3 06/05/2020    HCT 46 1 06/05/2020    MCV 88 7 06/05/2020     06/05/2020

## 2021-05-14 NOTE — DISCHARGE INSTRUCTIONS
Vasectomy   AMBULATORY CARE:   A vasectomy  is a procedure to make you sterile  It is a permanent form of birth control  The vas deferens (sperm tubes) are cut so that the semen does not contain sperm  How to prepare for a vasectomy:   · Talk to your partner about the type of birth control you will use right after your procedure  You will need to use another form of birth control until tests show that your semen does not contain sperm  This may take up to 3 months  When you have sex, use a condom, or have your partner use hormonal birth control, an IUD, or a diaphragm during this time  This will help prevent pregnancy  · Your healthcare provider will tell you how to prepare for your procedure  He or she may want you to bring an athletic supporter with you  You will use it after surgery to support your scrotum while you heal  You may be given instructions for how to clean your scrotum on the day of surgery  · Your provider may tell you not to eat or drink anything after midnight on the day of your procedure  He or she will tell you what medicines to take or not take on the day of your procedure  Arrange to have someone drive you home after the procedure and stay with you  What will happen during a vasectomy:  You will be given local anesthesia as an injection in your scrotum  Your scrotum will be numb but you may still feel pressure or pulling  You may also receive a sedative to help keep you calm  A small puncture or incision will be made  The sperm tube will be cut and a small portion removed  One or both ends will then be closed with stitches, medical clips, or a heat treatment  The same procedure will be done to the other sperm tube  Usually no stitches are needed and the tiny puncture wound will heal by itself  Sometimes medical glue is applied to keep the puncture wound closed  What to expect after a vasectomy:  Rest for about 30 minutes after the procedure   Ice and an athletic supporter may be applied to decrease discomfort  Risks of a vasectomy:  Your scrotum may be bruised or inflamed  You may get an infection or a hematoma (buildup of blood)  You may develop long-term pain in your scrotum  You may not become sterile if one or both of your cut sperm tubes grow back together  If you do not return as directed to have your semen checked, you may be at risk for pregnancy  Seek care immediately if:   · Your incision wound comes apart  · You see blood in your urine or semen  Contact your healthcare provider or surgeon if:   · You have a fever  · Your wound is red, swollen, or draining pus  · You feel pain or burning when you urinate  · You have worsening pain in your scrotum, even after you take medicine  · You have questions or concerns about your condition or care  Medicines:   · NSAIDs  help decrease swelling and pain or fever  This medicine is available with or without a doctor's order  NSAIDs can cause stomach bleeding or kidney problems in certain people  If you take blood thinner medicine, always ask your healthcare provider if NSAIDs are safe for you  Always read the medicine label and follow directions  · Take your medicine as directed  Contact your healthcare provider if you think your medicine is not helping or if you have side effects  Tell him or her if you are allergic to any medicine  Keep a list of the medicines, vitamins, and herbs you take  Include the amounts, and when and why you take them  Bring the list or the pill bottles to follow-up visits  Carry your medicine list with you in case of an emergency  Self-care:   · Care for your wound as directed  Do not shower for 24 hours  When you do shower, carefully wash the wound with soap and water  Pat the area dry gently  Do not swim or take a bath for at least 1 week  · Limit activity  for at least 2 days  Do not play sports, do yard work, or lift anything heavy for at least 2 weeks   Talk to your healthcare provider about when you can return to work  · Apply ice  on your scrotum for 15 to 20 minutes every hour for 2 days  Use an ice pack, or put crushed ice in a plastic bag  Cover it with a towel  Ice helps prevent tissue damage and decreases swelling and pain  · Wear an athletic supporter for at least 2 days  This will decrease pain and swelling, and protect your wound  Place a cushion such as a washcloth or small towel under your scrotum to elevate it  · Do not have sex for at least 1 week  You will not be sterile right away after a vasectomy  It will take time for all the sperm to be cleared from your body  You may need to ejaculate about 20 times or wait up to 3 months for the sperm to clear  Use another form of birth control during this time  Follow up with your healthcare provider as directed: Ask when to return to have your semen checked for sperm  Write down your questions so you remember to ask them during your visits  © Copyright [address] Information is for End User's use only and may not be sold, redistributed or otherwise used for commercial purposes  All illustrations and images included in CareNotes® are the copyrighted property of [employer]  or [address] [place], [place] [address]william Peguero   The above information is an  only  It is not intended as medical advice for individual conditions or treatments  Talk to your doctor, nurse or pharmacist before following any medical regimen to see if it is safe and effective for you

## 2021-05-28 ENCOUNTER — OFFICE VISIT (OUTPATIENT)
Dept: UROLOGY | Facility: CLINIC | Age: 31
End: 2021-05-28

## 2021-05-28 VITALS
SYSTOLIC BLOOD PRESSURE: 108 MMHG | HEART RATE: 75 BPM | WEIGHT: 187.6 LBS | BODY MASS INDEX: 24.86 KG/M2 | HEIGHT: 73 IN | DIASTOLIC BLOOD PRESSURE: 68 MMHG

## 2021-05-28 DIAGNOSIS — Z30.2 ENCOUNTER FOR STERILIZATION: Primary | ICD-10-CM

## 2021-05-28 PROCEDURE — 99024 POSTOP FOLLOW-UP VISIT: CPT | Performed by: PHYSICIAN ASSISTANT

## 2021-05-28 NOTE — PROGRESS NOTES
Assessment/Plan:    Encounter for sterilization  -  Patient status post vasectomy with Dr Beryle Kansas on 05/14/2021  Presents for semen analysis testing     -  Physical exam revealed healing surgical incisions, right suture almost fully dissolved, left suture dissolved  No overlying erythema or edema, mild incisional tenderness, no evidence of infectious processes or fluctuance  Progressing well      -  Discussed semen analysis  Discussed with patient that he will need to ejaculate 15 times prior to 1st semen analysis 6 weeks postoperatively  Dates were provided as well as scripts  Discussed that he will need to have a sample provided within 1 hour and brought to either [facility] lab or [facility], THE lab which is now accepting semen analysis  -  Patient is agreeable to current plan  Discussed if he has any additional questions that he should call  If his semen analysis is negative we will notify him and he may follow-up on an as-needed basis if he does not have any additional urologic complaints  Problem List Items Addressed This Visit        Other    Encounter for sterilization - Primary     -  Patient status post vasectomy with Dr Beryle Kansas on 05/14/2021  Presents for semen analysis testing     -  Physical exam revealed healing surgical incisions, right suture almost fully dissolved, left suture dissolved  No overlying erythema or edema, mild incisional tenderness, no evidence of infectious processes or fluctuance  Progressing well      -  Discussed semen analysis  Discussed with patient that he will need to ejaculate 15 times prior to 1st semen analysis 6 weeks postoperatively  Dates were provided as well as scripts  Discussed that he will need to have a sample provided within 1 hour and brought to either [facility] lab or [facility] THE lab which is now accepting semen analysis  -  Patient is agreeable to current plan    Discussed if he has any additional questions that he should call  If his semen analysis is negative we will notify him and he may follow-up on an as-needed basis if he does not have any additional urologic complaints  Relevant Orders    Semen analysis, post-vasectomy    Semen analysis, post-vasectomy            Subjective:      Patient ID: Jacqueline Diaz is a 33 y o  male  HPI   Patient is a 68-year-old male s/p vasectomy with Dr Fany Workman on 05/14/2021  Presenting for a 2 week follow-up  Patient currently reporting that he is doing well postoperatively  He does report some mild discomfort mainly with walking that he feels as though his sutures are rub against his underwear  He denies any overlying erythema or edema  He denies any purulent discharge or other abnormalities  Overall he is progressing well  He presents for semen analysis review in testing  The following portions of the patient's history were reviewed and updated as appropriate:   He  has a past medical history of Asthma, Bleeding per rectum (71/86/2751), Eosinophilic esophagitis, GERD (gastroesophageal reflux disease), and History of migraine headaches  He   Patient Active Problem List    Diagnosis Date Noted    Encounter for sterilization 04/27/2021    Asthma in adult, moderate persistent, uncomplicated 21/02/7038    Overweight 05/21/2020    Left shoulder pain 04/02/2020    Vertiginous migraine 07/16/2019    Gastro-esophageal reflux disease with esophagitis 03/07/2019    Acquired hypothyroidism 08/17/2017    Positive UGO (antinuclear antibody) 08/17/2017    Benign essential hypertension 59/81/0436    Eosinophilic esophagitis 88/63/6729    Migraine headache 11/10/2015     He  has a past surgical history that includes Cystoscopy; Tonsillectomy; Esophagogastroduodenoscopy; pr esophagogastroduodenoscopy transoral diagnostic (N/A, 3/11/2019); and pr removal of sperm duct(s) (Bilateral, 5/14/2021)    His family history includes Heart disease in his family; Hip dysplasia in his brother; Hypertension in his father; Migraines in his family and mother  He  reports that he has never smoked  He has never used smokeless tobacco  He reports that he does not drink alcohol or use drugs  Current Outpatient Medications   Medication Sig Dispense Refill    albuterol (2 5 mg/3 mL) 0 083 % nebulizer solution Take 1 vial (2 5 mg total) by nebulization every 6 (six) hours as needed for wheezing or shortness of breath 1080 mL 0    albuterol (PROVENTIL HFA,VENTOLIN HFA) 90 mcg/act inhaler Inhale 2 puffs every 6 (six) hours as needed for wheezing or shortness of breath 1 Inhaler 5    cyproheptadine (PERIACTIN) 4 mg tablet Take 4-8 mg nightly for migraine prevention  60 tablet 6    multivitamin (THERAGRAN) TABS Take 1 tablet by mouth daily Mix of supplemental - Prevent migraine      pantoprazole (PROTONIX) 40 mg tablet Take 1 tablet (40 mg total) by mouth daily (Patient taking differently: Take 40 mg by mouth daily Takes in the am) 30 tablet 11    prochlorperazine (COMPAZINE) 10 mg tablet Take 1 tablet (10 mg total) by mouth every 6 (six) hours as needed for nausea or vomiting 12 tablet 6    SUMAtriptan (IMITREX) 100 mg tablet Take 1 tablet (100 mg total) by mouth once as needed for migraine May repeat in 2 hours if needed  Max 2/24 hours, 3 days per week, 9 tabs/month 9 tablet 6    vitamin B-12 (VITAMIN B-12) 500 mcg tablet Take 500 mcg by mouth daily Pt reports last dose being on 5/9/21 prior to surgery       No current facility-administered medications for this visit  He is allergic to beef-derived products - food allergy and [place]       Review of Systems   Constitutional: Negative  Negative for chills  HENT: Negative  Eyes: Negative  Respiratory: Negative  Cardiovascular: Negative  Gastrointestinal: Negative  Endocrine: Negative  Genitourinary: Negative    Negative for discharge, penile pain, penile swelling, scrotal swelling and testicular pain         Mild scrotal discomfort   Musculoskeletal: Negative  Skin: Negative  Allergic/Immunologic: Negative  Neurological: Negative  Hematological: Negative  Psychiatric/Behavioral: Negative  Objective:      /68   Pulse 75   Ht 6' 1" (1 854 m)   Wt 85 1 kg (187 lb 9 6 oz)   BMI 24 75 kg/m²          Physical Exam  Constitutional:       General: He is not in acute distress  Appearance: Normal appearance  He is normal weight  He is not ill-appearing, toxic-appearing or diaphoretic  HENT:      Head: Normocephalic and atraumatic  Right Ear: External ear normal       Left Ear: External ear normal       Nose: Nose normal    Eyes:      General: No scleral icterus  Conjunctiva/sclera: Conjunctivae normal    Neck:      Musculoskeletal: Normal range of motion  Pulmonary:      Effort: Pulmonary effort is normal  No respiratory distress  Genitourinary:     Comments: Phallus circumcised  Scrotal examination revealed healing surgical incisions, right suture almost fully dissolved, left suture dissolved  No overlying erythema or edema, mild incisional tenderness, no evidence of infectious processes or fluctuance  Progressing well  Musculoskeletal: Normal range of motion  Neurological:      General: No focal deficit present  Mental Status: He is alert and oriented to person, place, and time  Psychiatric:         Mood and Affect: Mood normal          Behavior: Behavior normal          Thought Content:  Thought content normal          Judgment: Judgment normal            Jessee Castellanos PA-C

## 2021-05-28 NOTE — ASSESSMENT & PLAN NOTE
-  Patient status post vasectomy with Dr Ana Saunders on 05/14/2021  Presents for semen analysis testing     -  Physical exam revealed healing surgical incisions, right suture almost fully dissolved, left suture dissolved  No overlying erythema or edema, mild incisional tenderness, no evidence of infectious processes or fluctuance  Progressing well      -  Discussed semen analysis  Discussed with patient that he will need to ejaculate 15 times prior to 1st semen analysis 6 weeks postoperatively  Dates were provided as well as scripts  Discussed that he will need to have a sample provided within 1 hour and brought to either [facility] lab or [facility], THE lab which is now accepting semen analysis  He will have an additional semen analysis in 8 weeks  -  Patient is agreeable to current plan  Discussed if he has any additional questions that he should call  If his semen analysis is negative we will notify him and he may follow-up on an as-needed basis if he does not have any additional urologic complaints

## 2021-06-10 ENCOUNTER — APPOINTMENT (EMERGENCY)
Dept: CT IMAGING | Facility: HOSPITAL | Age: 31
End: 2021-06-10
Payer: COMMERCIAL

## 2021-06-10 ENCOUNTER — HOSPITAL ENCOUNTER (EMERGENCY)
Facility: HOSPITAL | Age: 31
Discharge: HOME | End: 2021-06-10
Attending: EMERGENCY MEDICINE | Admitting: EMERGENCY MEDICINE
Payer: COMMERCIAL

## 2021-06-10 VITALS
WEIGHT: 185 LBS | TEMPERATURE: 98.8 F | DIASTOLIC BLOOD PRESSURE: 72 MMHG | RESPIRATION RATE: 17 BRPM | OXYGEN SATURATION: 99 % | BODY MASS INDEX: 24.41 KG/M2 | SYSTOLIC BLOOD PRESSURE: 121 MMHG | HEART RATE: 76 BPM

## 2021-06-10 DIAGNOSIS — K59.00 CONSTIPATION, UNSPECIFIED CONSTIPATION TYPE: Primary | ICD-10-CM

## 2021-06-10 LAB
ALBUMIN SERPL BCP-MCNC: 4.6 G/DL (ref 3.5–5)
ALP SERPL-CCNC: 120 U/L (ref 46–116)
ALT SERPL W P-5'-P-CCNC: 25 U/L (ref 12–78)
ANION GAP SERPL CALCULATED.3IONS-SCNC: 8 MMOL/L (ref 4–13)
AST SERPL W P-5'-P-CCNC: 20 U/L (ref 5–45)
BASOPHILS # BLD AUTO: 0.06 THOUSANDS/ΜL (ref 0–0.1)
BASOPHILS NFR BLD AUTO: 0 % (ref 0–1)
BILIRUB SERPL-MCNC: 0.73 MG/DL (ref 0.2–1)
BILIRUB UR QL STRIP: NEGATIVE
BUN SERPL-MCNC: 10 MG/DL (ref 5–25)
CALCIUM SERPL-MCNC: 9 MG/DL (ref 8.3–10.1)
CHLORIDE SERPL-SCNC: 104 MMOL/L (ref 100–108)
CLARITY UR: CLEAR
CO2 SERPL-SCNC: 29 MMOL/L (ref 21–32)
COLOR UR: YELLOW
CREAT SERPL-MCNC: 0.72 MG/DL (ref 0.6–1.3)
EOSINOPHIL # BLD AUTO: 0 THOUSAND/ΜL (ref 0–0.61)
EOSINOPHIL NFR BLD AUTO: 0 % (ref 0–6)
ERYTHROCYTE [DISTWIDTH] IN BLOOD BY AUTOMATED COUNT: 11.7 % (ref 11.6–15.1)
GFR SERPL CREATININE-BSD FRML MDRD: 123 ML/MIN/1.73SQ M
GLUCOSE SERPL-MCNC: 101 MG/DL (ref 65–140)
GLUCOSE UR STRIP-MCNC: NEGATIVE MG/DL
HCT VFR BLD AUTO: 45.9 % (ref 36.5–49.3)
HGB BLD-MCNC: 15.3 G/DL (ref 12–17)
HGB UR QL STRIP.AUTO: NEGATIVE
IMM GRANULOCYTES # BLD AUTO: 0.07 THOUSAND/UL (ref 0–0.2)
IMM GRANULOCYTES NFR BLD AUTO: 1 % (ref 0–2)
KETONES UR STRIP-MCNC: NEGATIVE MG/DL
LEUKOCYTE ESTERASE UR QL STRIP: NEGATIVE
LYMPHOCYTES # BLD AUTO: 0.93 THOUSANDS/ΜL (ref 0.6–4.47)
LYMPHOCYTES NFR BLD AUTO: 6 % (ref 14–44)
MCH RBC QN AUTO: 29.1 PG (ref 26.8–34.3)
MCHC RBC AUTO-ENTMCNC: 33.3 G/DL (ref 31.4–37.4)
MCV RBC AUTO: 87 FL (ref 82–98)
MONOCYTES # BLD AUTO: 0.66 THOUSAND/ΜL (ref 0.17–1.22)
MONOCYTES NFR BLD AUTO: 4 % (ref 4–12)
NEUTROPHILS # BLD AUTO: 13.74 THOUSANDS/ΜL (ref 1.85–7.62)
NEUTS SEG NFR BLD AUTO: 89 % (ref 43–75)
NITRITE UR QL STRIP: NEGATIVE
NRBC BLD AUTO-RTO: 0 /100 WBCS
PH UR STRIP.AUTO: 7 [PH]
PLATELET # BLD AUTO: 310 THOUSANDS/UL (ref 149–390)
PMV BLD AUTO: 10.9 FL (ref 8.9–12.7)
POTASSIUM SERPL-SCNC: 4 MMOL/L (ref 3.5–5.3)
PROT SERPL-MCNC: 7.6 G/DL (ref 6.4–8.2)
PROT UR STRIP-MCNC: NEGATIVE MG/DL
RBC # BLD AUTO: 5.25 MILLION/UL (ref 3.88–5.62)
SODIUM SERPL-SCNC: 141 MMOL/L (ref 136–145)
SP GR UR STRIP.AUTO: <=1.005 (ref 1–1.03)
UROBILINOGEN UR QL STRIP.AUTO: 0.2 E.U./DL
WBC # BLD AUTO: 15.46 THOUSAND/UL (ref 4.31–10.16)

## 2021-06-10 PROCEDURE — 81003 URINALYSIS AUTO W/O SCOPE: CPT | Performed by: EMERGENCY MEDICINE

## 2021-06-10 PROCEDURE — 85025 COMPLETE CBC W/AUTO DIFF WBC: CPT | Performed by: EMERGENCY MEDICINE

## 2021-06-10 PROCEDURE — 36415 COLL VENOUS BLD VENIPUNCTURE: CPT | Performed by: EMERGENCY MEDICINE

## 2021-06-10 PROCEDURE — 99284 EMERGENCY DEPT VISIT MOD MDM: CPT | Performed by: EMERGENCY MEDICINE

## 2021-06-10 PROCEDURE — 80053 COMPREHEN METABOLIC PANEL: CPT | Performed by: EMERGENCY MEDICINE

## 2021-06-10 PROCEDURE — 99284 EMERGENCY DEPT VISIT MOD MDM: CPT

## 2021-06-10 PROCEDURE — 74177 CT ABD & PELVIS W/CONTRAST: CPT

## 2021-06-10 RX ORDER — POLYETHYLENE GLYCOL 3350 17 G/17G
17 POWDER, FOR SOLUTION ORAL DAILY
Qty: 116 G | Refills: 0 | Status: SHIPPED | OUTPATIENT
Start: 2021-06-10 | End: 2021-06-21

## 2021-06-10 RX ORDER — MAGNESIUM CARB/ALUMINUM HYDROX 105-160MG
296 TABLET,CHEWABLE ORAL ONCE
Status: COMPLETED | OUTPATIENT
Start: 2021-06-10 | End: 2021-06-10

## 2021-06-10 RX ADMIN — MAGNESIUM CITRATE 296 ML: 1.75 LIQUID ORAL at 15:57

## 2021-06-10 RX ADMIN — IOHEXOL 100 ML: 350 INJECTION, SOLUTION INTRAVENOUS at 13:52

## 2021-06-10 NOTE — ED NOTES
Patient reports a passing a significant amount of stool  Dr Joya Harmon made aware        Yen Ty, RN  06/10/21 2035

## 2021-06-10 NOTE — ED NOTES
Soap suds enema given to patient   Only a slight amount of stool came out with the liquid placed       Elizabeth Barbosa RN  06/10/21 [id number]

## 2021-06-10 NOTE — ED PROVIDER NOTES
Pt Name: Rajani Koenig  MRN: [id number]  [place] 1990  Age/Sex: 33 y o  male  Date of evaluation: 6/10/2021  PCP: Tayler Cortez MD    [address] [place] [address]    Chief Complaint   Patient presents with    Diarrhea     reports difficulty urinating and having "quarter size diarrhea and I know there is a lot more in there "   Pt reports also seeing blood in his stool  Vasectomy 1 month ago         HPI    33 y o  male presenting with constipation and difficulty urinating  Patient had vasectomy 3-4 weeks ago, no issues with that  Has been feeling constipated over the last 2 days, but did have a normal bowel movement yesterday  This morning has had issues with moving his bowels, states only had a quarter size of loose stool  Has had 2 episodes of BRB on toilet paper upon wiping  Also has had difficulty urinating today, states he feels like he has to push to urinate  No fevers or chills, no abdominal pain  No n/v  No other abdominal surgical hx  Past Medical and Surgical History    Past Medical History:   Diagnosis Date    Asthma     5/12/21 Pt states has not had any recent issues with asthma    Bleeding per rectum 56/03/7110    Eosinophilic esophagitis     GERD (gastroesophageal reflux disease)     History of migraine headaches     5/12/21 Pt states with migraine headaches approx 2x/week  Pt has family hx and also with seasonal weather changes  Past Surgical History:   Procedure Laterality Date    CYSTOSCOPY      for urethral stricture    ESOPHAGOGASTRODUODENOSCOPY      PA ESOPHAGOGASTRODUODENOSCOPY TRANSORAL DIAGNOSTIC N/A 3/11/2019    Procedure: ESOPHAGOGASTRODUODENOSCOPY (EGD); Surgeon: Marilee De La Paz DO;  Location: [facility];   Service: Gastroenterology    PA REMOVAL OF SPERM DUCT(S) Bilateral 5/14/2021    Procedure: Manas Gabriel;  Surgeon: Fuad Jonas MD;  [facility];  Service: Urology    TONSILLECTOMY         Family History   Problem Relation Age of Onset    Heart disease Family     Migraines Family     Migraines Mother     Hypertension Father     Hip dysplasia Brother        Social History     Tobacco Use    Smoking status: Never Smoker    Smokeless tobacco: Never Used    Tobacco comment: non-smoker   Vaping Use    Vaping Use: Never used   Substance Use Topics    Alcohol use: No     Comment: Denies    Drug use: No     Comment: Denies           Allergies    Allergies   Allergen Reactions    Beef-Derived Products - Food Allergy Throat Swelling     EOE    Lac Bovis Other (See Comments)     Milk allergy -eosinophilic esophagitis       Home Medications    Prior to Admission medications    Medication Sig Start Date End Date Taking? Authorizing Provider   multivitamin (THERAGRAN) TABS Take 1 tablet by mouth daily Mix of supplemental - Prevent migraine   Yes Historical Provider, MD   pantoprazole (PROTONIX) 40 mg tablet Take 1 tablet (40 mg total) by mouth daily  Patient taking differently: Take 40 mg by mouth daily Takes in the am 10/22/20  Yes Shelia Carver MD   SUMAtriptan (IMITREX) 100 mg tablet Take 1 tablet (100 mg total) by mouth once as needed for migraine May repeat in 2 hours if needed  Max 2/24 hours, 3 days per week, 9 tabs/month 10/14/20  Yes Genoveva Ardon MD   vitamin B-12 (VITAMIN B-12) 500 mcg tablet Take 500 mcg by mouth daily Pt reports last dose being on 5/9/21 prior to surgery   Yes Historical Provider, MD   albuterol (2 5 mg/3 mL) 0 083 % nebulizer solution Take 1 vial (2 5 mg total) by nebulization every 6 (six) hours as needed for wheezing or shortness of breath  Patient not taking: Reported on 6/10/2021 7/27/20   Chris Lujan MD   albuterol (PROVENTIL HFA,VENTOLIN HFA) 90 mcg/act inhaler Inhale 2 puffs every 6 (six) hours as needed for wheezing or shortness of breath  Patient not taking: Reported on 6/10/2021 3/20/20   Tree Lakhani MD   cyproheptadine (PERIACTIN) 4 mg tablet Take 4-8 mg nightly for migraine prevention    Patient not taking: Reported on 6/10/2021 10/14/20   Galina Page MD   prochlorperazine (COMPAZINE) 10 mg tablet Take 1 tablet (10 mg total) by mouth every 6 (six) hours as needed for nausea or vomiting  Patient not taking: Reported on 6/10/2021 2/26/21   Galina Page MD           Review of Systems    Review of Systems   Constitutional: Negative for chills and fever  HENT: Negative for rhinorrhea and sore throat  Eyes: Negative for pain and visual disturbance  Respiratory: Negative for cough and shortness of breath  Cardiovascular: Negative for chest pain and leg swelling  Gastrointestinal: Positive for constipation  Negative for abdominal pain, nausea and vomiting  Genitourinary: Positive for difficulty urinating  Negative for dysuria and hematuria  Musculoskeletal: Negative for back pain and myalgias  Skin: Negative for rash and wound  Neurological: Negative for syncope and headaches  Physical Exam      ED Triage Vitals [06/10/21 1200]   Temperature Pulse Respirations Blood Pressure SpO2   98 8 °F (37 1 °C) 100 18 147/77 99 %      Temp Source Heart Rate Source Patient Position - Orthostatic VS BP Location FiO2 (%)   Oral Monitor Sitting Left arm --      Pain Score       --               Physical Exam  Constitutional:       General: He is not in acute distress  Appearance: He is not ill-appearing  HENT:      Head: Normocephalic and atraumatic  Nose: Nose normal    Eyes:      Extraocular Movements: Extraocular movements intact  Pupils: Pupils are equal, round, and reactive to light  Neck:      Musculoskeletal: Normal range of motion and neck supple  Cardiovascular:      Rate and Rhythm: Normal rate and regular rhythm  Pulmonary:      Effort: No respiratory distress  Breath sounds: Normal breath sounds  No wheezing  Abdominal:      General: There is no distension  Palpations: Abdomen is soft  Tenderness: There is no abdominal tenderness     Musculoskeletal: Normal range of motion  General: No swelling or deformity  Skin:     General: Skin is warm  Findings: No erythema  Neurological:      Mental Status: He is alert and oriented to person, place, and time  Mental status is at baseline                Diagnostic Results      Labs:    Results Reviewed     Procedure Component Value Units Date/Time    Comprehensive metabolic panel [[id number]]  (Abnormal) Collected: 06/10/21 1310    Lab Status: Final result Specimen: Blood from Arm, Right Updated: 06/10/21 1335     Sodium 141 mmol/L      Potassium 4 0 mmol/L      Chloride 104 mmol/L      CO2 29 mmol/L      ANION GAP 8 mmol/L      BUN 10 mg/dL      Creatinine 0 72 mg/dL      Glucose 101 mg/dL      Calcium 9 0 mg/dL      AST 20 U/L      ALT 25 U/L      Alkaline Phosphatase 120 U/L      Total Protein 7 6 g/dL      Albumin 4 6 g/dL      Total Bilirubin 0 73 mg/dL      eGFR 123 ml/min/1 73sq m     Narrative:      National Kidney Disease Foundation guidelines for Chronic Kidney Disease (CKD):     Stage 1 with normal or high GFR (GFR > 90 mL/min/1 73 square meters)    Stage 2 Mild CKD (GFR = 60-89 mL/min/1 73 square meters)    Stage 3A Moderate CKD (GFR = 45-59 mL/min/1 73 square meters)    Stage 3B Moderate CKD (GFR = 30-44 mL/min/1 73 square meters)    Stage 4 Severe CKD (GFR = 15-29 mL/min/1 73 square meters)    Stage 5 End Stage CKD (GFR <15 mL/min/1 73 square meters)  Note: GFR calculation is accurate only with a steady state creatinine    CBC and differential [[id number]]  (Abnormal) Collected: 06/10/21 1310    Lab Status: Final result Specimen: Blood from Arm, Right Updated: 06/10/21 1325     WBC 15 46 Thousand/uL      RBC 5 25 Million/uL      Hemoglobin 15 3 g/dL      Hematocrit 45 9 %      MCV 87 fL      MCH 29 1 pg      MCHC 33 3 g/dL      RDW 11 7 %      MPV 10 9 fL      Platelets 712 Thousands/uL      nRBC 0 /100 WBCs      Neutrophils Relative 89 %      Immat GRANS % 1 %      Lymphocytes Relative 6 % Monocytes Relative 4 %      Eosinophils Relative 0 %      Basophils Relative 0 %      Neutrophils Absolute 13 74 Thousands/µL      Immature Grans Absolute 0 07 Thousand/uL      Lymphocytes Absolute 0 93 Thousands/µL      Monocytes Absolute 0 66 Thousand/µL      Eosinophils Absolute 0 00 Thousand/µL      Basophils Absolute 0 06 Thousands/µL     UA (URINE) with reflex to Scope [[id number]] Collected: 06/10/21 1310    Lab Status: Final result Specimen: Urine, Clean Catch Updated: 06/10/21 1323     Color, UA Yellow     Clarity, UA Clear     Specific Gravity, UA <=1 005     pH, UA 7 0     Leukocytes, UA Negative     Nitrite, UA Negative     Protein, UA Negative mg/dl      Glucose, UA Negative mg/dl      Ketones, UA Negative mg/dl      Urobilinogen, UA 0 2 E U /dl      Bilirubin, UA Negative     Blood, UA Negative          All labs reviewed and utilized in the medical decision making process    Radiology:    CT abdomen pelvis with contrast   Final Result      Large bolus of stool in the rectosigmoid colon consistent with constipation  Marked urine filled distention of the urinary bladder resulting in mild fullness of renal collecting systems  Otherwise unremarkable examination  Workstation performed: [id number]             All radiology studies independently viewed by me and interpreted by the radiologist     Procedure    Procedures        MDM    Nonspecific leukocytosis, otherwise blood work and UA reassuring  CT scan shows significant constipation, distended urinary bladder, this is likely due to constipation  Patient given an enema and Mag citrate with large stool output and resolution of his difficulty urinating  Feels much better  Prescribed MiraLax  Advised hydration  PCP follow-up  ED return precautions discussed, patient verbalized understanding and is in agreement with plan            Medications   iohexol (OMNIPAQUE) 350 MG/ML injection (SINGLE-DOSE) 100 mL (100 mL Intravenous Given 6/10/21 1352)   magnesium citrate (CITROMA) oral solution 296 mL (296 mL Oral Given 6/10/21 1557)           FINAL IMPRESSION    Final diagnoses:   Constipation, unspecified constipation type         DISPOSITION    Time reflects when diagnosis was documented in both MDM as applicable and the Disposition within this note     Time User Action Codes Description Comment    6/10/2021  5:01 PM Lewis Bell Add [K59 00] Constipation, unspecified constipation type       ED Disposition     ED Disposition Condition Date/Time Comment    Discharge Stable Thu Segundo 10, 2021  5:01 PM Kathieolimpia De La Fuente discharge to home/self care              Follow-up Information     Follow up With Specialties Details Why Santosh Buchanan MD Internal Medicine Schedule an appointment as soon as possible for a visit in 1 week  [address] [place] [address]              PATIENT REFERRED TO:    Jaylene Liao MD  [address] [place] [address]  [phone]    Schedule an appointment as soon as possible for a visit in 1 week        DISCHARGE MEDICATIONS:    Discharge Medication List as of 6/10/2021  5:03 PM      START taking these medications    Details   polyethylene glycol (GLYCOLAX) 17 GM/SCOOP powder Take 17 g by mouth daily, Starting Thu 6/10/2021, Normal         CONTINUE these medications which have NOT CHANGED    Details   albuterol (2 5 mg/3 mL) 0 083 % nebulizer solution Take 1 vial (2 5 mg total) by nebulization every 6 (six) hours as needed for wheezing or shortness of breath, Starting Mon 7/27/2020, Normal      albuterol (PROVENTIL HFA,VENTOLIN HFA) 90 mcg/act inhaler Inhale 2 puffs every 6 (six) hours as needed for wheezing or shortness of breath, Starting Fri 3/20/2020, Normal      cyproheptadine (PERIACTIN) 4 mg tablet Take 4-8 mg nightly for migraine prevention , Normal      multivitamin (THERAGRAN) TABS Take 1 tablet by mouth daily Mix of supplemental - Prevent migraine, Historical Med      pantoprazole (PROTONIX) 40 mg tablet Take 1 tablet (40 mg total) by mouth daily, Starting Thu 10/22/2020, Normal      prochlorperazine (COMPAZINE) 10 mg tablet Take 1 tablet (10 mg total) by mouth every 6 (six) hours as needed for nausea or vomiting, Starting Fri 2/26/2021, Normal      SUMAtriptan (IMITREX) 100 mg tablet Take 1 tablet (100 mg total) by mouth once as needed for migraine May repeat in 2 hours if needed  Max 2/24 hours, 3 days per week, 9 tabs/month, Starting Wed 10/14/2020, Normal      vitamin B-12 (VITAMIN B-12) 500 mcg tablet Take 500 mcg by mouth daily Pt reports last dose being on 5/9/21 prior to surgery, Historical Med             No discharge procedures on file  Angella Jacinto DO        This note was partially completed using voice recognition technology, and was scanned for gross errors; however some errors may still exist  Please contact the author with any questions or requests for clarification        Angella Jacinto DO  06/16/21 7703

## 2021-06-18 ENCOUNTER — OFFICE VISIT (OUTPATIENT)
Dept: INTERNAL MEDICINE CLINIC | Facility: CLINIC | Age: 31
End: 2021-06-18
Payer: COMMERCIAL

## 2021-06-18 VITALS
DIASTOLIC BLOOD PRESSURE: 68 MMHG | WEIGHT: 187 LBS | SYSTOLIC BLOOD PRESSURE: 112 MMHG | HEART RATE: 64 BPM | HEIGHT: 73 IN | BODY MASS INDEX: 24.78 KG/M2 | TEMPERATURE: 98.7 F | OXYGEN SATURATION: 99 %

## 2021-06-18 DIAGNOSIS — Z11.4 ENCOUNTER FOR SCREENING FOR HIV: ICD-10-CM

## 2021-06-18 DIAGNOSIS — J45.40 ASTHMA IN ADULT, MODERATE PERSISTENT, UNCOMPLICATED: ICD-10-CM

## 2021-06-18 DIAGNOSIS — Z23 NEED FOR VACCINATION: ICD-10-CM

## 2021-06-18 DIAGNOSIS — E03.9 ACQUIRED HYPOTHYROIDISM: ICD-10-CM

## 2021-06-18 DIAGNOSIS — Z00.00 ENCOUNTER FOR WELLNESS EXAMINATION IN ADULT: Primary | ICD-10-CM

## 2021-06-18 DIAGNOSIS — Z11.59 NEED FOR HEPATITIS C SCREENING TEST: ICD-10-CM

## 2021-06-18 PROBLEM — Z30.2 ENCOUNTER FOR STERILIZATION: Status: RESOLVED | Noted: 2021-04-27 | Resolved: 2021-06-18

## 2021-06-18 PROCEDURE — 3008F BODY MASS INDEX DOCD: CPT | Performed by: INTERNAL MEDICINE

## 2021-06-18 PROCEDURE — 90471 IMMUNIZATION ADMIN: CPT

## 2021-06-18 PROCEDURE — 90715 TDAP VACCINE 7 YRS/> IM: CPT

## 2021-06-18 PROCEDURE — 99395 PREV VISIT EST AGE 18-39: CPT | Performed by: INTERNAL MEDICINE

## 2021-06-18 PROCEDURE — 1036F TOBACCO NON-USER: CPT | Performed by: INTERNAL MEDICINE

## 2021-06-18 PROCEDURE — 3725F SCREEN DEPRESSION PERFORMED: CPT | Performed by: INTERNAL MEDICINE

## 2021-06-18 NOTE — PROGRESS NOTES
[facility] ANNUAL PHYSICAL  [facility] [address] [place] [address]    NAME: Joaquin Durbin  AGE: 33 y o  SEX: male  : 1990     DATE: 2021    Assessment and Plan     Problem List Items Addressed This Visit        Endocrine    Acquired hypothyroidism    Relevant Orders    CBC and differential    Comprehensive metabolic panel    Lipid panel    TSH, 3rd generation       Respiratory    Asthma in adult, moderate persistent, uncomplicated      Other Visit Diagnoses     Encounter for wellness examination in adult    -  Primary    Encounter for screening for HIV        Relevant Orders    HIV 1/2 Antigen/Antibody (4th Generation) w Reflex [facility]    Need for hepatitis C screening test        Relevant Orders    Hepatitis C antibody    Need for vaccination        Relevant Orders    TDAP VACCINE GREATER THAN OR EQUAL TO 6YO IM            · Patient Counseling:   --Nutrition: Stressed importance of moderation in sodium/caffeine intake, saturated fat and cholesterol, caloric balance, sufficient intake of fresh fruits, vegetables, fiber, calcium, iron, and 1 mg of folate supplement per day (for females capable of pregnancy)  --Discussed the issue of estrogen replacement, calcium supplement, and the daily use of baby aspirin  --Exercise: Stressed the importance of regular exercise  --Substance Abuse: Discussed cessation/primary prevention of tobacco, alcohol, or other drug use; driving or other dangerous activities under the influence; availability of treatment for abuse  --Sexuality: Discussed sexually transmitted diseases, partner selection, use of condoms, avoidance of unintended pregnancy  and contraceptive alternatives  --Injury prevention: Discussed safety belts, safety helmets, smoke detector, smoking near bedding or upholstery  --Dental health: Discussed importance of regular tooth brushing, flossing, and dental visits  --Immunizations reviewed    --After hours service discussed with patient  · Discussed benefits of screening   · BMI Counseling: Body mass index is 24 67 kg/m²  Discussed the patient's BMI with him  The BMI is normal     Return in about 1 year (around 6/18/2022)  Chief Complaint     Chief Complaint   Patient presents with    Physical Exam     Patient recently had a vesectomy and a ER visist due to constipation        History of Present Illness     HPI    Well Adult Physical   Patient here for a comprehensive physical exam       Diet and Physical Activity  Diet: well balanced diet  Weight concerns: None, patient's BMI is between 18 5-24 9  Exercise: frequently      Depression Screen  PHQ-9 Depression Screening    PHQ-9:   Frequency of the following problems over the past two weeks:      Little interest or pleasure in doing things: 0 - not at all  Feeling down, depressed, or hopeless: 0 - not at all  PHQ-2 Score: 0          General Health  Hearing: Normal:  bilateral  Vision: no vision problems  Dental: regular dental visits    Reproductive Health  good      The following portions of the patient's history were reviewed and updated as appropriate: allergies, current medications, past family history, past medical history, past social history, past surgical history and problem list     Review of Systems     Review of Systems   Constitutional: Negative for chills, diaphoresis, fatigue and fever  HENT: Negative for congestion, ear discharge, ear pain, hearing loss, postnasal drip, rhinorrhea, sinus pressure, sinus pain, sneezing, sore throat and voice change  Eyes: Negative for pain, discharge, redness and visual disturbance  Respiratory: Negative for cough, chest tightness, shortness of breath and wheezing  Cardiovascular: Negative for chest pain, palpitations and leg swelling  Gastrointestinal: Negative for abdominal distention, abdominal pain, blood in stool, constipation, diarrhea, nausea and vomiting     Endocrine: Negative for cold intolerance, heat intolerance, polydipsia, polyphagia and polyuria  Genitourinary: Negative for dysuria, flank pain, frequency, hematuria and urgency  Musculoskeletal: Negative for arthralgias, back pain, gait problem, joint swelling, myalgias, neck pain and neck stiffness  Skin: Negative for rash  Neurological: Negative for dizziness, tremors, syncope, facial asymmetry, speech difficulty, weakness, light-headedness, numbness and headaches  Hematological: Does not bruise/bleed easily  Psychiatric/Behavioral: Negative for behavioral problems, confusion and sleep disturbance  The patient is not nervous/anxious  Past Medical History     Past Medical History:   Diagnosis Date    Asthma     5/12/21 Pt states has not had any recent issues with asthma    Bleeding per rectum 04/21/5924    Eosinophilic esophagitis     GERD (gastroesophageal reflux disease)     History of migraine headaches     5/12/21 Pt states with migraine headaches approx 2x/week  Pt has family hx and also with seasonal weather changes  Past Surgical History     Past Surgical History:   Procedure Laterality Date    CYSTOSCOPY      for urethral stricture    ESOPHAGOGASTRODUODENOSCOPY      MD ESOPHAGOGASTRODUODENOSCOPY TRANSORAL DIAGNOSTIC N/A 3/11/2019    Procedure: ESOPHAGOGASTRODUODENOSCOPY (EGD); Surgeon: Mayra Arndt DO;  Location[facility];   Service: Gastroenterology    MD REMOVAL OF SPERM DUCT(S) Bilateral 5/14/2021    Procedure: Ciro Silverman;  Surgeon: Te Brannon MD;  Location: [facility];  Service: Urology    TONSILLECTOMY         Social History     Social History     Socioeconomic History    Marital status: /Civil Union     Spouse name: None    Number of children: None    Years of education: None    Highest education level: None   Occupational History    None   Tobacco Use    Smoking status: Never Smoker    Smokeless tobacco: Never Used    Tobacco comment: non-smoker   Vaping Use    Vaping Use: Never used Substance and Sexual Activity    Alcohol use: No     Comment: Denies    Drug use: No     Comment: Denies    Sexual activity: Yes     Partners: Female   Other Topics Concern    None   Social History Narrative    None     Social Determinants of Health     Financial Resource Strain:     Difficulty of Paying Living Expenses:    Food Insecurity:     Worried About Running Out of Food in the Last Year:     Ran Out of Food in the Last Year:    Transportation Needs:     Lack of Transportation (Medical):      Lack of Transportation (Non-Medical):    Physical Activity:     Days of Exercise per Week:     Minutes of Exercise per Session:    Stress:     Feeling of Stress :    Social Connections:     Frequency of Communication with Friends and Family:     Frequency of Social Gatherings with Friends and Family:     Attends Yarsani Services:     Active Member of Clubs or Organizations:     Attends Club or Organization Meetings:     Marital Status:    Intimate Partner Violence:     Fear of Current or Ex-Partner:     Emotionally Abused:     Physically Abused:     Sexually Abused:        Family History     Family History   Problem Relation Age of Onset    Heart disease Family     Migraines Family     Migraines Mother     Hypertension Father     Hip dysplasia Brother        Current Medications       Current Outpatient Medications:     albuterol (2 5 mg/3 mL) 0 083 % nebulizer solution, Take 1 vial (2 5 mg total) by nebulization every 6 (six) hours as needed for wheezing or shortness of breath, Disp: 1080 mL, Rfl: 0    albuterol (PROVENTIL HFA,VENTOLIN HFA) 90 mcg/act inhaler, Inhale 2 puffs every 6 (six) hours as needed for wheezing or shortness of breath, Disp: 1 Inhaler, Rfl: 5    multivitamin (THERAGRAN) TABS, Take 1 tablet by mouth daily Mix of supplemental - Prevent migraine, Disp: , Rfl:     pantoprazole (PROTONIX) 40 mg tablet, Take 1 tablet (40 mg total) by mouth daily (Patient taking differently: Take 40 mg by mouth daily Takes in the am), Disp: 30 tablet, Rfl: 11    polyethylene glycol (GLYCOLAX) 17 GM/SCOOP powder, Take 17 g by mouth daily, Disp: 116 g, Rfl: 0    SUMAtriptan (IMITREX) 100 mg tablet, Take 1 tablet (100 mg total) by mouth once as needed for migraine May repeat in 2 hours if needed  Max 2/24 hours, 3 days per week, 9 tabs/month, Disp: 9 tablet, Rfl: 6    vitamin B-12 (VITAMIN B-12) 500 mcg tablet, Take 500 mcg by mouth daily Pt reports last dose being on 5/9/21 prior to surgery, Disp: , Rfl:      Allergies     Allergies   Allergen Reactions    Beef-Derived Products - Food Allergy Throat Swelling     EOE    Lac Bovis Other (See Comments)     Milk allergy -eosinophilic esophagitis       Objective     /68 (BP Location: Left arm, Patient Position: Sitting, Cuff Size: Standard) Comment: bp  Pulse 64   Temp 98 7 °F (37 1 °C) (Temporal) Comment: NO NSAIDS  Ht 6' 1" (1 854 m)   Wt 84 8 kg (187 lb)   SpO2 99%   BMI 24 67 kg/m²      Physical Exam  Constitutional:       General: He is not in acute distress  Appearance: He is well-developed  He is not diaphoretic  HENT:      Head: Normocephalic and atraumatic  Right Ear: External ear normal       Left Ear: External ear normal       Nose: Nose normal    Eyes:      General: No scleral icterus  Right eye: No discharge  Left eye: No discharge  Conjunctiva/sclera: Conjunctivae normal    Neck:      Thyroid: No thyromegaly  Vascular: No JVD  Trachea: No tracheal deviation  Cardiovascular:      Rate and Rhythm: Normal rate and regular rhythm  Heart sounds: Normal heart sounds  No murmur heard  No friction rub  No gallop  Pulmonary:      Effort: Pulmonary effort is normal  No respiratory distress  Breath sounds: Normal breath sounds  No wheezing or rales  Chest:      Chest wall: No tenderness  Abdominal:      General: Bowel sounds are normal  There is no distension  Palpations: Abdomen is soft  Tenderness: There is no abdominal tenderness  There is no guarding or rebound  Musculoskeletal:         General: No tenderness  Normal range of motion  Cervical back: Normal range of motion and neck supple  Lymphadenopathy:      Cervical: No cervical adenopathy  Skin:     General: Skin is warm and dry  Findings: No erythema or rash  Neurological:      Mental Status: He is alert and oriented to person, place, and time  Cranial Nerves: No cranial nerve deficit  Motor: No abnormal muscle tone        Coordination: Coordination normal    Psychiatric:         Judgment: Judgment normal            No exam data present    Health Maintenance     Health Maintenance   Topic Date Due    Hepatitis C Screening  Never done    Pneumococcal Vaccine: Pediatrics (0 to 5 Years) and At-Risk Patients (6 to 59 Years) (1 of 2 - PPSV23) Never done    HIV Screening  Never done    DTaP,Tdap,and Td Vaccines (1 - Tdap) Never done    Influenza Vaccine (Season Ended) 09/01/2021    Depression Screening PHQ  06/18/2022    BMI: Adult  06/18/2022    Annual Physical  06/18/2022    COVID-19 Vaccine  Completed    HIB Vaccine  Aged Out    Hepatitis B Vaccine  Aged Out    IPV Vaccine  Aged Out    Hepatitis A Vaccine  Aged Out    Meningococcal ACWY Vaccine  Aged Out    HPV Vaccine  Aged Out     Immunization History   Administered Date(s) Administered    Influenza Injectable, MDCK, Preservative Free, Quadrivalent, 0 5 mL 12/20/2018    Influenza Quadrivalent, 6-35 Months IM 11/10/2015, 11/07/2017    SARS-CoV-2 / COVID-19 mRNA IM (Pfizer-BioNTech) 03/17/2021, 04/07/2021       Maria C Dominguez MD  [facility] [address] [place] [address]

## 2021-06-21 ENCOUNTER — OFFICE VISIT (OUTPATIENT)
Dept: INTERNAL MEDICINE CLINIC | Facility: CLINIC | Age: 31
End: 2021-06-21
Payer: COMMERCIAL

## 2021-06-21 VITALS
HEIGHT: 73 IN | HEART RATE: 81 BPM | DIASTOLIC BLOOD PRESSURE: 76 MMHG | SYSTOLIC BLOOD PRESSURE: 118 MMHG | WEIGHT: 191.6 LBS | OXYGEN SATURATION: 98 % | TEMPERATURE: 97.7 F | BODY MASS INDEX: 25.39 KG/M2

## 2021-06-21 DIAGNOSIS — L23.7 POISON IVY DERMATITIS: Primary | ICD-10-CM

## 2021-06-21 PROCEDURE — 99213 OFFICE O/P EST LOW 20 MIN: CPT | Performed by: INTERNAL MEDICINE

## 2021-06-21 PROCEDURE — 1036F TOBACCO NON-USER: CPT | Performed by: INTERNAL MEDICINE

## 2021-06-21 RX ORDER — PREDNISONE 10 MG/1
TABLET ORAL
Qty: 30 TABLET | Refills: 0 | Status: SHIPPED | OUTPATIENT
Start: 2021-06-21 | End: 2021-07-13

## 2021-06-21 NOTE — PROGRESS NOTES
INTERNAL MEDICINE FOLLOW-UP OFFICE VISIT  [facility] BEHAVIORAL MEDICINE AT [facility]    NAME: Bruno Ramos  AGE: 33 y o  SEX: male  : 1990   MRN: [id number]    DATE: 2021  TIME: 9:34 AM    Assessment and Plan     Diagnoses and all orders for this visit:    Poison ivy dermatitis  -     predniSONE 10 mg tablet; Take 4 tabs daily for 3 days followed by 3 tabs daily for 3 days then 2 tabs daily for 3 days then 1 tab daily for 3 days        - Counseling Documentation: patient was counseled regarding: instructions for management, risk factor reductions, prognosis, patient and family education, risks and benefits of treatment options and importance of compliance with treatment  - Medication Side Effects: Adverse side effects of medications were reviewed with the patient/guardian today  Return to office in: as needed    Chief Complaint     Chief Complaint   Patient presents with    Rash     on both hands,  started yesterday morning, very itchy       History of Present Illness     HPI   has a rash on both his hands and arms  He was working in the [employer] recently and has a history of pause in ivy dermatitis  The following portions of the patient's history were reviewed and updated as appropriate: allergies, current medications, past family history, past medical history, past social history, past surgical history and problem list     Review of Systems     Review of Systems   Constitutional: Negative for chills, diaphoresis, fatigue and fever  HENT: Negative for congestion, ear discharge, ear pain, hearing loss, postnasal drip, rhinorrhea, sinus pressure, sinus pain, sneezing, sore throat and voice change  Eyes: Negative for pain, discharge, redness and visual disturbance  Respiratory: Negative for cough, chest tightness, shortness of breath and wheezing  Cardiovascular: Negative for chest pain, palpitations and leg swelling     Gastrointestinal: Negative for abdominal distention, abdominal pain, blood in stool, constipation, diarrhea, nausea and vomiting  Endocrine: Negative for cold intolerance, heat intolerance, polydipsia, polyphagia and polyuria  Genitourinary: Negative for dysuria, flank pain, frequency, hematuria and urgency  Musculoskeletal: Negative for arthralgias, back pain, gait problem, joint swelling, myalgias, neck pain and neck stiffness  Skin: Positive for rash  Neurological: Negative for dizziness, tremors, syncope, facial asymmetry, speech difficulty, weakness, light-headedness, numbness and headaches  Hematological: Does not bruise/bleed easily  Psychiatric/Behavioral: Negative for behavioral problems, confusion and sleep disturbance  The patient is not nervous/anxious  Active Problem List     Patient Active Problem List   Diagnosis    Benign essential hypertension    Eosinophilic esophagitis    Acquired hypothyroidism    Migraine headache    Positive UGO (antinuclear antibody)    Gastro-esophageal reflux disease with esophagitis    Vertiginous migraine    Left shoulder pain    Overweight    Asthma in adult, moderate persistent, uncomplicated       Objective     /76 (BP Location: Left arm, Patient Position: Sitting, Cuff Size: Standard)   Pulse 81   Temp 97 7 °F (36 5 °C) (Temporal) Comment: no nsaids  Ht 6' 1" (1 854 m)   Wt 86 9 kg (191 lb 9 6 oz)   SpO2 98%   BMI 25 28 kg/m²     Physical Exam  Constitutional:       General: He is not in acute distress  Appearance: He is well-developed  He is not diaphoretic  HENT:      Head: Normocephalic and atraumatic  Right Ear: External ear normal       Left Ear: External ear normal       Nose: Nose normal    Eyes:      General: No scleral icterus  Right eye: No discharge  Left eye: No discharge  Conjunctiva/sclera: Conjunctivae normal    Neck:      Thyroid: No thyromegaly  Vascular: No JVD  Trachea: No tracheal deviation     Cardiovascular:      Rate and Rhythm: Normal rate and regular rhythm  Heart sounds: Normal heart sounds  No murmur heard  No friction rub  No gallop  Pulmonary:      Effort: Pulmonary effort is normal  No respiratory distress  Breath sounds: Normal breath sounds  No wheezing or rales  Chest:      Chest wall: No tenderness  Abdominal:      General: Bowel sounds are normal  There is no distension  Palpations: Abdomen is soft  Tenderness: There is no abdominal tenderness  There is no guarding or rebound  Musculoskeletal:         General: No tenderness  Normal range of motion  Cervical back: Normal range of motion and neck supple  Lymphadenopathy:      Cervical: No cervical adenopathy  Skin:     General: Skin is warm and dry  Findings: Rash present  No erythema  Comments: Has a rash on both the hands as well as the arms up to the elbows   Neurological:      Mental Status: He is alert and oriented to person, place, and time  Cranial Nerves: No cranial nerve deficit  Motor: No abnormal muscle tone        Coordination: Coordination normal    Psychiatric:         Judgment: Judgment normal              Current Medications       Current Outpatient Medications:     albuterol (2 5 mg/3 mL) 0 083 % nebulizer solution, Take 1 vial (2 5 mg total) by nebulization every 6 (six) hours as needed for wheezing or shortness of breath, Disp: 1080 mL, Rfl: 0    albuterol (PROVENTIL HFA,VENTOLIN HFA) 90 mcg/act inhaler, Inhale 2 puffs every 6 (six) hours as needed for wheezing or shortness of breath, Disp: 1 Inhaler, Rfl: 5    multivitamin (THERAGRAN) TABS, Take 1 tablet by mouth daily Mix of supplemental - Prevent migraine, Disp: , Rfl:     pantoprazole (PROTONIX) 40 mg tablet, Take 1 tablet (40 mg total) by mouth daily (Patient taking differently: Take 40 mg by mouth daily Takes in the am), Disp: 30 tablet, Rfl: 11    SUMAtriptan (IMITREX) 100 mg tablet, Take 1 tablet (100 mg total) by mouth once as needed for migraine May repeat in 2 hours if needed   Max 2/24 hours, 3 days per week, 9 tabs/month, Disp: 9 tablet, Rfl: 6    vitamin B-12 (VITAMIN B-12) 500 mcg tablet, Take 500 mcg by mouth daily Pt reports last dose being on 5/9/21 prior to surgery, Disp: , Rfl:     predniSONE 10 mg tablet, Take 4 tabs daily for 3 days followed by 3 tabs daily for 3 days then 2 tabs daily for 3 days then 1 tab daily for 3 days, Disp: 30 tablet, Rfl: 0    Health Maintenance     Health Maintenance   Topic Date Due    Hepatitis C Screening  Never done    Pneumococcal Vaccine: Pediatrics (0 to 5 Years) and At-Risk Patients (6 to 59 Years) (1 of 2 - PPSV23) Never done    HIV Screening  Never done    BMI: Followup Plan  05/21/2021    Depression Screening PHQ  06/18/2022    Annual Physical  06/18/2022    BMI: Adult  06/21/2022    DTaP,Tdap,and Td Vaccines (2 - Td or Tdap) 06/18/2031    Influenza Vaccine  Completed    COVID-19 Vaccine  Completed    HIB Vaccine  Aged Out    Hepatitis B Vaccine  Aged Out    IPV Vaccine  Aged Out    Hepatitis A Vaccine  Aged Out    Meningococcal ACWY Vaccine  Aged Out    HPV Vaccine  Aged Out     Immunization History   Administered Date(s) Administered    INFLUENZA 11/08/2020    Influenza Injectable, MDCK, Preservative Free, Quadrivalent, 0 5 mL 12/20/2018    Influenza Quadrivalent, 6-35 Months IM 11/10/2015, 11/07/2017    SARS-CoV-2 / COVID-19 mRNA IM (Pfizer-BioNTech) 03/17/2021, 04/07/2021    Tdap 06/18/2021         MD [facility]AV[facility] MEDIC[facility] AT [facility] [place]

## 2021-06-22 ENCOUNTER — TELEPHONE (OUTPATIENT)
Dept: INTERNAL MEDICINE CLINIC | Facility: CLINIC | Age: 31
End: 2021-06-22

## 2021-06-22 DIAGNOSIS — E03.9 ACQUIRED HYPOTHYROIDISM: Primary | ICD-10-CM

## 2021-06-22 LAB
ALBUMIN SERPL-MCNC: 4.7 G/DL (ref 3.6–5.1)
ALBUMIN/GLOB SERPL: 2.1 (CALC) (ref 1–2.5)
ALP SERPL-CCNC: 109 U/L (ref 36–130)
ALT SERPL-CCNC: 12 U/L (ref 9–46)
AST SERPL-CCNC: 17 U/L (ref 10–40)
BASOPHILS # BLD AUTO: 83 CELLS/UL (ref 0–200)
BASOPHILS NFR BLD AUTO: 1.4 %
BILIRUB SERPL-MCNC: 0.5 MG/DL (ref 0.2–1.2)
BUN SERPL-MCNC: 11 MG/DL (ref 7–25)
BUN/CREAT SERPL: NORMAL (CALC) (ref 6–22)
CALCIUM SERPL-MCNC: 9.3 MG/DL (ref 8.6–10.3)
CHLORIDE SERPL-SCNC: 102 MMOL/L (ref 98–110)
CHOLEST SERPL-MCNC: 145 MG/DL
CHOLEST/HDLC SERPL: 2.6 (CALC)
CO2 SERPL-SCNC: 25 MMOL/L (ref 20–32)
CREAT SERPL-MCNC: 0.74 MG/DL (ref 0.6–1.35)
EOSINOPHIL # BLD AUTO: 89 CELLS/UL (ref 15–500)
EOSINOPHIL NFR BLD AUTO: 1.5 %
ERYTHROCYTE [DISTWIDTH] IN BLOOD BY AUTOMATED COUNT: 11.7 % (ref 11–15)
GLOBULIN SER CALC-MCNC: 2.2 G/DL (CALC) (ref 1.9–3.7)
GLUCOSE SERPL-MCNC: 92 MG/DL (ref 65–99)
HCT VFR BLD AUTO: 42.1 % (ref 38.5–50)
HCV AB S/CO SERPL IA: <0.02
HCV AB SERPL QL IA: NORMAL
HDLC SERPL-MCNC: 55 MG/DL
HGB BLD-MCNC: 14.2 G/DL (ref 13.2–17.1)
HIV 1+2 AB+HIV1 P24 AG SERPL QL IA: NORMAL
LDLC SERPL CALC-MCNC: 75 MG/DL (CALC)
LYMPHOCYTES # BLD AUTO: 1693 CELLS/UL (ref 850–3900)
LYMPHOCYTES NFR BLD AUTO: 28.7 %
MCH RBC QN AUTO: 29.5 PG (ref 27–33)
MCHC RBC AUTO-ENTMCNC: 33.7 G/DL (ref 32–36)
MCV RBC AUTO: 87.3 FL (ref 80–100)
MONOCYTES # BLD AUTO: 572 CELLS/UL (ref 200–950)
MONOCYTES NFR BLD AUTO: 9.7 %
NEUTROPHILS # BLD AUTO: 3463 CELLS/UL (ref 1500–7800)
NEUTROPHILS NFR BLD AUTO: 58.7 %
NONHDLC SERPL-MCNC: 90 MG/DL (CALC)
PLATELET # BLD AUTO: 281 THOUSAND/UL (ref 140–400)
PMV BLD REES-ECKER: 10.9 FL (ref 7.5–12.5)
POTASSIUM SERPL-SCNC: 4.1 MMOL/L (ref 3.5–5.3)
PROT SERPL-MCNC: 6.9 G/DL (ref 6.1–8.1)
RBC # BLD AUTO: 4.82 MILLION/UL (ref 4.2–5.8)
SL AMB EGFR AFRICAN AMERICAN: 140 ML/MIN/1.73M2
SL AMB EGFR NON AFRICAN AMERICAN: 121 ML/MIN/1.73M2
SODIUM SERPL-SCNC: 138 MMOL/L (ref 135–146)
TRIGL SERPL-MCNC: 74 MG/DL
TSH SERPL-ACNC: 5.25 MIU/L (ref 0.4–4.5)
WBC # BLD AUTO: 5.9 THOUSAND/UL (ref 3.8–10.8)

## 2021-06-22 NOTE — TELEPHONE ENCOUNTER
----- Message from Christian Dietz MD sent at 6/22/2021 12:48 PM EDT -----  TSH is high, please repeat in 1 month

## 2021-07-06 DIAGNOSIS — R21 RASH: Primary | ICD-10-CM

## 2021-07-09 ENCOUNTER — APPOINTMENT (OUTPATIENT)
Dept: LAB | Facility: HOSPITAL | Age: 31
End: 2021-07-09
Payer: COMMERCIAL

## 2021-07-09 DIAGNOSIS — Z30.2 ENCOUNTER FOR STERILIZATION: ICD-10-CM

## 2021-07-09 LAB
DEPRECATED CD4 CELLS/CD8 CELLS BLD: 2.6 ML
SPERM MOTILE SMN QL MICRO: ABNORMAL

## 2021-07-09 PROCEDURE — 89321 SEMEN ANAL SPERM DETECTION: CPT

## 2021-07-13 ENCOUNTER — CONSULT (OUTPATIENT)
Dept: DERMATOLOGY | Facility: CLINIC | Age: 31
End: 2021-07-13
Payer: COMMERCIAL

## 2021-07-13 VITALS — WEIGHT: 186 LBS | TEMPERATURE: 96.1 F | HEIGHT: 73 IN | BODY MASS INDEX: 24.65 KG/M2

## 2021-07-13 DIAGNOSIS — R21 RASH: ICD-10-CM

## 2021-07-13 DIAGNOSIS — D22.9 MULTIPLE MELANOCYTIC NEVI: Primary | ICD-10-CM

## 2021-07-13 PROCEDURE — 99204 OFFICE O/P NEW MOD 45 MIN: CPT | Performed by: DERMATOLOGY

## 2021-07-13 PROCEDURE — 3008F BODY MASS INDEX DOCD: CPT | Performed by: INTERNAL MEDICINE

## 2021-07-13 RX ORDER — CLOBETASOL PROPIONATE 0.5 MG/G
CREAM TOPICAL 2 TIMES DAILY
Qty: 15 G | Refills: 0 | Status: SHIPPED | OUTPATIENT
Start: 2021-07-13 | End: 2022-08-09

## 2021-07-13 RX ORDER — TRIAMCINOLONE ACETONIDE 5 MG/G
OINTMENT TOPICAL
COMMUNITY
Start: 2021-07-07 | End: 2022-08-09

## 2021-07-13 NOTE — PROGRESS NOTES
[facility] Note     Patient Name: Shayy Khan  Encounter Date: 7/13/2021     Have you been cared for by a [facility] in the last 3 years and, if so, which one? No    · Have you traveled outside of the [address] [place] [address] in the past 3 months or outside of the [facility] area in the last 2 weeks? No     May we call your Preferred Phone number to discuss your specific medical information? Yes     May we leave a detailed message that includes your specific medical information? Yes      Today's Chief Concerns:   Concern #1:  Rash   Concern #2:      Past Medical History:  Have you personally ever had or currently have any of the following? · Skin cancer (such as Melanoma, Basal Cell Carcinoma, Squamous Cell Carcinoma? (If Yes, please provide more detail)- No  · Eczema: No  · Psoriasis: No  · HIV/AIDS: No  · Hepatitis B or C: No  · Tuberculosis: No  · Systemic Immunosuppression such as Diabetes, Biologic or Immunotherapy, Chemotherapy, Organ Transplantation, Bone Marrow Transplantation (If YES, please provide more detail): No  · Radiation Treatment (If YES, please provide more detail): No  · Any other major medical conditions/concerns? (If Yes, which types)- Asthma    Social History:     What is/was your primary occupation?       What are your hobbies/past-times? Family History:  Have any of your "first degree relatives" (parent, brother, sister, or child) had any of the following       · Skin cancer such as Melanoma or Merkel Cell Carcinoma or Pancreatic Cancer? No  · Eczema, Asthma, Hay Fever or Seasonal Allergies: YES, Asthma, seasonal allergies  · Psoriasis or Psoriatic Arthritis: No  · Do any other medical conditions seem to run in your family? If Yes, what condition and which relatives?   No    Current Medications:   (please update all dermatological medications before printing patient's AVS!)      Current Outpatient Medications:     albuterol (2 5 mg/3 mL) 0 083 % nebulizer solution, Take 1 vial (2 5 mg total) by nebulization every 6 (six) hours as needed for wheezing or shortness of breath, Disp: 1080 mL, Rfl: 0    albuterol (PROVENTIL HFA,VENTOLIN HFA) 90 mcg/act inhaler, Inhale 2 puffs every 6 (six) hours as needed for wheezing or shortness of breath, Disp: 1 Inhaler, Rfl: 5    multivitamin (THERAGRAN) TABS, Take 1 tablet by mouth daily Mix of supplemental - Prevent migraine, Disp: , Rfl:     pantoprazole (PROTONIX) 40 mg tablet, Take 1 tablet (40 mg total) by mouth daily (Patient taking differently: Take 40 mg by mouth daily Takes in the am), Disp: 30 tablet, Rfl: 11    predniSONE 10 mg tablet, Take 4 tabs daily for 3 days followed by 3 tabs daily for 3 days then 2 tabs daily for 3 days then 1 tab daily for 3 days, Disp: 30 tablet, Rfl: 0    SUMAtriptan (IMITREX) 100 mg tablet, Take 1 tablet (100 mg total) by mouth once as needed for migraine May repeat in 2 hours if needed  Max 2/24 hours, 3 days per week, 9 tabs/month, Disp: 9 tablet, Rfl: 6    vitamin B-12 (VITAMIN B-12) 500 mcg tablet, Take 500 mcg by mouth daily Pt reports last dose being on 5/9/21 prior to surgery, Disp: , Rfl:       Review of Systems:  Have you recently had or currently have any of the following? If YES, what are you doing for the problem? · Fever, chills or unintended weight loss: No  · Sudden loss or change in your vision: No  · Nausea, vomiting or blood in your stool: No  · Painful or swollen joints: No  · Wheezing or cough: No  · Changing mole or non-healing wound: No  · Nosebleeds: No  · Excessive sweating: No  · Easy or prolonged bleeding? No  · Over the last 2 weeks, how often have you been bothered by the following problems?   · Taking little interest or pleasure in doing things: 1 - Not at All  · Feeling down, depressed, or hopeless: 1 - Not at All  · Rapid heartbeat with epinephrine:  No    · FEMALES ONLY:    · Are you pregnant or planning to become pregnant? N/A  · Are you currently or planning to be nursing or breast feeding? N/A    · Any known allergies? Allergies   Allergen Reactions    Beef-Derived Products - Food Allergy Throat Swelling     EOE    Lac Bovis Other (See Comments)     Milk allergy -eosinophilic esophagitis   ·       Physical Exam:     Was a chaperone (Derm Clinical Assistant) present throughout the entire Physical Exam? Yes     Did the Dermatology Team specifically  the patient on the importance of a Full Skin Exam to be sure that nothing is missed clinically?  Yes}  o Did the patient ultimately request or accept a Full Skin Exam?  Yes  o Did the patient specifically refuse to have the areas "under-the-bra" examined by the Dermatologist? No  o Did the patient specifically refuse to have the areas "under-the-underwear" examined by the Dermatologist? No    CONSTITUTIONAL:   Vitals:    07/13/21 1047   Temp: (!) 96 1 °F (35 6 °C)   Weight: 84 4 kg (186 lb)   Height: 6' 1" (1 854 m)         PSYCH: Normal mood and affect  EYES: Normal conjunctiva  ENT: Normal lips and oral mucosa  CARDIOVASCULAR: No edema  RESPIRATORY: Normal respirations  HEME/LYMPH/IMMUNO:  No regional lymphadenopathy except as noted below in "ASSESSMENT AND PLAN BY DIAGNOSIS"    SKIN:  FULL ORGAN SYSTEM EXAM   Face Normal except as noted below in Assessment   Neck, Cervical Chain Nodes    Right Hand/Fingers Normal except as noted below in Assessment   Left Hand/Fingers Normal except as noted below in Assessment   Chest/Breasts/Axillae    Abdomen, Umbilicus    Back/Spine Normal except as noted below in Assessment   Groin/Genitalia/Buttocks NOT EXAMINED   Right Leg, Foot, Toes    Left Leg, Foot, Toes         Assessment and Plan by Diagnosis:    History of Present Condition:     Duration:  How long has this been an issue for you?    o  1 month   Location Affected:  Where on the body is this affecting you?    o  bilateral hands   Quality:  Is there any bleeding, pain, itch, burning/irritation, or redness associated with the skin lesion? o  Redness, itch   Severity:  Describe any bleeding, pain, itch, burning/irritation, or redness on a scale of 1 to 10 (with 10 being the worst)  o  N/A   Timing:  Does this condition seem to be there pretty constantly or do you notice it more at specific times throughout the day?    o  Prednisone cleared up rash but came back as soon as finished prednisone   Context:  Have you ever noticed that this condition seems to be associated with specific activities you do?    o  Working in the garden   Modifying Factors:    o Anything that seems to make the condition worse?    -  Heat, going outside  o What have you tried to do to make the condition better?    -  oral prednisone, triamcinolone 0 5% ointment    Associated Signs and Symptoms:  Does this skin lesion seem to be associated with any of the following:  o  SL AMB DERM SIGNS AND SYMPTOMS: Redness, itch     1  RASH    Physical Exam:   (Anatomic Location); (Size and Morphological Description); (Differential Diagnosis):  o Bilateral hands with pink red papules and plaques, no scale or vesicles   Pertinent Positives:   Pertinent Negatives: Additional History of Present Condition:  Present for one month  Oral prednisone cleared rash, a few days after finishing dose of prednisone rash re appeared  Currently using Triamcinolone 0 5 ointment with good relief  Start as fluid filled blisters and then spread out  Assessment and Plan:  Based on a thorough discussion of this condition and the management approach to it (including a comprehensive discussion of the known risks, side effects and potential benefits of treatment), the patient (family) agrees to implement the following specific plan:   Discussed that most likely you came into contact with something that caused rash      If rash does come back it would be helpful if you were re evaluated in the office prior to taking any oral prednisone or topical steroid and we would possibly take a biopsy   Clobetasol 0  05% Cream: Apply topically twice a day as needed to affected areas of hands  Do not use both the Clobetasol and Triamcinolone at the same time       2  MELANOCYTIC NEVI ("Moles")    Physical Exam:   Anatomic Location Affected:   Mostly on sun-exposed areas of the Right lower back   Morphological Description:  Scattered, 1-4mm round to ovoid, symmetrical-appearing, even bordered, skin colored to dark brown macules/papules, mostly in sun-exposed areas   Pertinent Positives:   Pertinent Negatives: Additional History of Present Condition:      Assessment and Plan:  Based on a thorough discussion of this condition and the management approach to it (including a comprehensive discussion of the known risks, side effects and potential benefits of treatment), the patient (family) agrees to implement the following specific plan:   Monitor for any changes in size, shape and color  Melanocytic Nevi  Melanocytic nevi ("moles") are tan or brown, raised or flat areas of the skin which have an increased number of melanocytes  Melanocytes are the cells in our body which make pigment and account for skin color  Some moles are present at birth (I e , "congenital nevi"), while others come up later in life (i e , "acquired nevi")  The sun can stimulate the body to make more moles  Sunburns are not the only thing that triggers more moles  Chronic sun exposure can do it too  Clinically distinguishing a healthy mole from melanoma may be difficult, even for experienced dermatologists  The "ABCDE's" of moles have been suggested as a means of helping to alert a person to a suspicious mole and the possible increased risk of melanoma  The suggestions for raising alert are as follows:    Asymmetry: Healthy moles tend to be symmetric, while melanomas are often asymmetric    Asymmetry means if you draw a line through the mole, the two halves do not match in color, size, shape, or surface texture  Asymmetry can be a result of rapid enlargement of a mole, the development of a raised area on a previously flat lesion, scaling, ulceration, bleeding or scabbing within the mole  Any mole that starts to demonstrate "asymmetry" should be examined promptly by a board certified dermatologist      Border: Healthy moles tend to have discrete, even borders  The border of a melanoma often blends into the normal skin and does not sharply delineate the mole from normal skin  Any mole that starts to demonstrate "uneven borders" should be examined promptly by a board certified dermatologist      Color: Healthy moles tend to be one color throughout  Melanomas tend to be made up of different colors ranging from dark black, blue, white, or red  Any mole that demonstrates a color change should be examined promptly by a board certified dermatologist      Diameter: Healthy moles tend to be smaller than 0 6 cm in size; an exception are "congenital nevi" that can be larger  Melanomas tend to grow and can often be greater than 0 6 cm (1/4 of an inch, or the size of a pencil eraser)  This is only a guideline, and many normal moles may be larger than 0 6 cm without being unhealthy  Any mole that starts to change in size (small to bigger or bigger to smaller) should be examined promptly by a board certified dermatologist      Evolving: Healthy moles tend to "stay the same "  Melanomas may often show signs of change or evolution such as a change in size, shape, color, or elevation  Any mole that starts to itch, bleed, crust, burn, hurt, or ulcerate or demonstrate a change or evolution should be examined promptly by a board certified dermatologist       Dysplastic Nevi  Dysplastic moles are moles that fit the ABCDE rules of melanoma but are not identified as melanomas when examined under the microscope    They may indicate an increased risk of melanoma in that person  If there is a family history of melanoma, most experts agree that the person may be at an increased risk for developing a melanoma  Experts still do not agree on what dysplastic moles mean in patients without a personal or family history of melanoma  Dysplastic moles are usually larger than common moles and have different colors within it with irregular borders  The appearance can be very similar to a melanoma  Biopsies of dysplastic moles may show abnormalities which are different from a regular mole  Melanoma  Malignant melanoma is a type of skin cancer that can be deadly if it spreads throughout the body  The incidence of melanoma in the [place] is growing faster than any other cancer  Melanoma usually grows near the surface of the skin for a period of time, and then begins to grow deeper into the skin  Once it grows deeper into the skin, the risk of spread to other organs greatly increases  Therefore, early detection and removal of a malignant melanoma may result in a better chance at a complete cure; removal after the tumor has spread may not be as effective, leading to worse clinical outcomes such as death  The true rate of nevus transformation into a melanoma is unknown  It has been estimated that the lifetime risk for any acquired melanocytic nevus on any 21year-old individual transforming into melanoma by age [de-identified] is 0 03% (1 in 3,164) for men and 0 009% (1 in 10,800) for women  The appearance of a "new mole" remains one of the most reliable methods for identifying a malignant melanoma  Occasionally, melanomas appear as rapidly growing, blue-black, dome-shaped bumps within a previous mole or previous area of normal skin  Other times, melanomas are suspected when a mole suddenly appears or changes  Itching, burning, or pain in a pigmented lesion should increase suspicion, but most patients with early melanoma have no skin discomfort whatsoever    Melanoma can occur anywhere on the skin, including areas that are difficult for self-examination  Many melanomas are first noticed by other family members  Suspicious-looking moles may be removed for microscopic examination  You may be able to prevent death from melanoma by doing two simple things:    1  Try to avoid unnecessary sun exposure and protect your skin when it is exposed to the sun  People who live near the equator, people who have intermittent exposures to large amounts of sun, and people who have had sunburns in childhood or adolescence have an increased risk for melanoma  Sun sense and vigilant sun protection may be keys to helping to prevent melanoma  We recommend wearing UPF-rated sun protective clothing and sunglasses whenever possible and applying a moisturizer-sunscreen combination product (SPF 50+) such as Neutrogena Daily Defense to sun exposed areas of skin at least three times a day  2  Have your moles regularly examined by a board certified dermatologist AND by yourself or a family member/friend at home  We recommend that you have your moles examined at least once a year by a board certified dermatologist   Use your birthday as an annual reminder to have your "Birthday Suit" (I e , your skin) examined; it is a nice birthday gift to yourself to know that your skin is healthy appearing! Additionally, at-home self examinations may be helpful for detecting a possible melanoma  Use the ABCDEs we discussed and check your moles once a month at home                          Scribe Attestation    I,:  Michael Patel am acting as a scribe while in the presence of the attending physician :       I,:  Syed Abbasi MD personally performed the services described in this documentation    as scribed in my presence :

## 2021-07-13 NOTE — PATIENT INSTRUCTIONS
RASH      Assessment and Plan:  Based on a thorough discussion of this condition and the management approach to it (including a comprehensive discussion of the known risks, side effects and potential benefits of treatment), the patient (family) agrees to implement the following specific plan:   Discussed that most likely you came into contact with something that caused rash   If rash does come back it would be helpful if you were re evaluated in the office prior to taking any oral prednisone or topical steroid and we would possibly take a biopsy   Clobetasol 0  05% Cream: Apply topically twice a day as needed to affected areas of hands  Do not use both the Clobetasol and Triamcinolone at the same time       2  MELANOCYTIC NEVI ("Moles")    Assessment and Plan:  Based on a thorough discussion of this condition and the management approach to it (including a comprehensive discussion of the known risks, side effects and potential benefits of treatment), the patient (family) agrees to implement the following specific plan:   Monitor for any changes in size, shape and color  Melanocytic Nevi  Melanocytic nevi ("moles") are tan or brown, raised or flat areas of the skin which have an increased number of melanocytes  Melanocytes are the cells in our body which make pigment and account for skin color  Some moles are present at birth (I e , "congenital nevi"), while others come up later in life (i e , "acquired nevi")  The sun can stimulate the body to make more moles  Sunburns are not the only thing that triggers more moles  Chronic sun exposure can do it too  Clinically distinguishing a healthy mole from melanoma may be difficult, even for experienced dermatologists  The "ABCDE's" of moles have been suggested as a means of helping to alert a person to a suspicious mole and the possible increased risk of melanoma    The suggestions for raising alert are as follows:    Asymmetry: Healthy moles tend to be symmetric, while melanomas are often asymmetric  Asymmetry means if you draw a line through the mole, the two halves do not match in color, size, shape, or surface texture  Asymmetry can be a result of rapid enlargement of a mole, the development of a raised area on a previously flat lesion, scaling, ulceration, bleeding or scabbing within the mole  Any mole that starts to demonstrate "asymmetry" should be examined promptly by a board certified dermatologist      Border: Healthy moles tend to have discrete, even borders  The border of a melanoma often blends into the normal skin and does not sharply delineate the mole from normal skin  Any mole that starts to demonstrate "uneven borders" should be examined promptly by a board certified dermatologist      Color: Healthy moles tend to be one color throughout  Melanomas tend to be made up of different colors ranging from dark black, blue, white, or red  Any mole that demonstrates a color change should be examined promptly by a board certified dermatologist      Diameter: Healthy moles tend to be smaller than 0 6 cm in size; an exception are "congenital nevi" that can be larger  Melanomas tend to grow and can often be greater than 0 6 cm (1/4 of an inch, or the size of a pencil eraser)  This is only a guideline, and many normal moles may be larger than 0 6 cm without being unhealthy  Any mole that starts to change in size (small to bigger or bigger to smaller) should be examined promptly by a board certified dermatologist      Evolving: Healthy moles tend to "stay the same "  Melanomas may often show signs of change or evolution such as a change in size, shape, color, or elevation    Any mole that starts to itch, bleed, crust, burn, hurt, or ulcerate or demonstrate a change or evolution should be examined promptly by a board certified dermatologist       Dysplastic Nevi  Dysplastic moles are moles that fit the ABCDE rules of melanoma but are not identified as melanomas when examined under the microscope  They may indicate an increased risk of melanoma in that person  If there is a family history of melanoma, most experts agree that the person may be at an increased risk for developing a melanoma  Experts still do not agree on what dysplastic moles mean in patients without a personal or family history of melanoma  Dysplastic moles are usually larger than common moles and have different colors within it with irregular borders  The appearance can be very similar to a melanoma  Biopsies of dysplastic moles may show abnormalities which are different from a regular mole  Melanoma  Malignant melanoma is a type of skin cancer that can be deadly if it spreads throughout the body  The incidence of melanoma in the [place] is growing faster than any other cancer  Melanoma usually grows near the surface of the skin for a period of time, and then begins to grow deeper into the skin  Once it grows deeper into the skin, the risk of spread to other organs greatly increases  Therefore, early detection and removal of a malignant melanoma may result in a better chance at a complete cure; removal after the tumor has spread may not be as effective, leading to worse clinical outcomes such as death  The true rate of nevus transformation into a melanoma is unknown  It has been estimated that the lifetime risk for any acquired melanocytic nevus on any 21year-old individual transforming into melanoma by age [de-identified] is 0 03% (1 in 3,164) for men and 0 009% (1 in 10,800) for women  The appearance of a "new mole" remains one of the most reliable methods for identifying a malignant melanoma  Occasionally, melanomas appear as rapidly growing, blue-black, dome-shaped bumps within a previous mole or previous area of normal skin  Other times, melanomas are suspected when a mole suddenly appears or changes   Itching, burning, or pain in a pigmented lesion should increase suspicion, but most patients with early melanoma have no skin discomfort whatsoever  Melanoma can occur anywhere on the skin, including areas that are difficult for self-examination  Many melanomas are first noticed by other family members  Suspicious-looking moles may be removed for microscopic examination  You may be able to prevent death from melanoma by doing two simple things:    1  Try to avoid unnecessary sun exposure and protect your skin when it is exposed to the sun  People who live near the equator, people who have intermittent exposures to large amounts of sun, and people who have had sunburns in childhood or adolescence have an increased risk for melanoma  Sun sense and vigilant sun protection may be keys to helping to prevent melanoma  We recommend wearing UPF-rated sun protective clothing and sunglasses whenever possible and applying a moisturizer-sunscreen combination product (SPF 50+) such as Neutrogena Daily Defense to sun exposed areas of skin at least three times a day  2  Have your moles regularly examined by a board certified dermatologist AND by yourself or a family member/friend at home  We recommend that you have your moles examined at least once a year by a board certified dermatologist   Use your birthday as an annual reminder to have your "Birthday Suit" (I e , your skin) examined; it is a nice birthday gift to yourself to know that your skin is healthy appearing! Additionally, at-home self examinations may be helpful for detecting a possible melanoma  Use the ABCDEs we discussed and check your moles once a month at home

## 2021-08-13 ENCOUNTER — TELEPHONE (OUTPATIENT)
Dept: NEUROLOGY | Facility: CLINIC | Age: 31
End: 2021-08-13

## 2021-08-13 NOTE — TELEPHONE ENCOUNTER
Called and spoke to patient - confirmed upcoming appointment with Daniel Acosta 08/26/21 10:00 am at the [facility] office  Provided patient with apt date, time and location  Informed patient that check in is at least 15 minutes prior to apt time  The patient is not  having any issues or concerns at this time

## 2021-08-26 ENCOUNTER — OFFICE VISIT (OUTPATIENT)
Dept: NEUROLOGY | Facility: CLINIC | Age: 31
End: 2021-08-26
Payer: COMMERCIAL

## 2021-08-26 VITALS
HEART RATE: 70 BPM | DIASTOLIC BLOOD PRESSURE: 84 MMHG | TEMPERATURE: 97.5 F | WEIGHT: 189.2 LBS | BODY MASS INDEX: 25.08 KG/M2 | SYSTOLIC BLOOD PRESSURE: 122 MMHG | HEIGHT: 73 IN

## 2021-08-26 DIAGNOSIS — G43.009 MIGRAINE WITHOUT AURA AND WITHOUT STATUS MIGRAINOSUS, NOT INTRACTABLE: Primary | ICD-10-CM

## 2021-08-26 PROCEDURE — 99214 OFFICE O/P EST MOD 30 MIN: CPT | Performed by: PSYCHIATRY & NEUROLOGY

## 2021-08-26 PROCEDURE — 1036F TOBACCO NON-USER: CPT | Performed by: PSYCHIATRY & NEUROLOGY

## 2021-08-26 PROCEDURE — 3008F BODY MASS INDEX DOCD: CPT | Performed by: PSYCHIATRY & NEUROLOGY

## 2021-08-26 RX ORDER — RIMEGEPANT SULFATE 75 MG/75MG
TABLET, ORALLY DISINTEGRATING ORAL
Qty: 8 TABLET | Refills: 3 | Status: SHIPPED | OUTPATIENT
Start: 2021-08-26 | End: 2022-01-03

## 2021-08-26 NOTE — PATIENT INSTRUCTIONS
(no texting, emailing, playing with your phone, iPad or tablet in bed)  [x]?? HYDRATION - Maintain good hydration   Drink  2L of fluid a day (4 typical small water bottles)  [x]? ? DIET - Maintain good nutrition  In particular don't skip meals and try and eat healthy balanced meals regularly  [x]? ? TRIGGERS - Look for other triggers and avoid them: Limit caffeine to 1-2 cups a day or less  Avoid dietary triggers that you have noticed bring on your headaches (this could include aged cheese, peanuts, MSG, aspartame and nitrates)  [x]? ? EXERCISE - physical exercise as we all know is good for you in many ways, and not only is good for your heart, but also is beneficial for your mental health, cognitive health and  chronic pain/headaches  I would encourage at the least 5 days of physical exercise weekly for at least 30 minutes       Education and Follow-up  [x]? ? Please call with any questions or concerns  Of course if any new concerning symptoms go to the emergency department  [x]? ? Follow up 6 months  Yolanda jaffe

## 2021-08-26 NOTE — PROGRESS NOTES
[facility] [facility] Consult - Follow up   PATIENT:  Randi Contreras  MRN:  [id number]  :  1990  DATE OF SERVICE:  2021  REFERRED BY: Issac Chen MD  PMD: Casa Carmona MD    Assessment/Plan:     Jayleen Linh a delightful 31 y  o  male with a past medical history that includes hypothyroidism, asthma, borderline hypertension, eosinophilic esophagitis, GERD, migraines, positive UGO  referred here for evaluation of headache  My initial evaluation 2020  Follow up 10/14/2020, 2021, 2021     Migraine without aura without status migrainosus, not intractable  [employer] reports a long history of headaches and migraines as well as a strong family history of migraines  [facility] reports pain is typically bilateral temporal region  [facility] denies aura and reports typical associated migrainous features   He denies autonomic features   No new or concerning features   As of initial visit migraines stable over the past 5 years  - as of 2020: 2-3 times a week migraines for 5 years  - As of 10/14/2020: His had some improvement migraines once a week although lately he thinks related to seasonal changes were every day this week  Taking headache preventative supplements on and off for 2-3 months  Trial of cyproheptadine 4-8 mg nightly during seasonal changes for prevention  Did not tolerate rizatriptan due to side effects  Continue sumatriptan for abortive  - as of 2021: He reports overall improvement on headache preventative supplements with migraines approximately twice per week that respond 90% of the time to sumatriptan 25 mg  He has had occasional migraine with nausea for which we will start trial of prochlorperazine to be taking on its own or with sumatriptan  He has not yet tried cyproheptadine during seasonal changes for prevention (tried once during the day with Side effects)    If this does not work would recommend Decadron to stop the 10 day migraines he has 4 times a year  - as of 8/26/2021: migraines he feels are stable, anywhere from 1-10 a month  Continue supplements for prevention and trial of nurtec for abortive  Workup:  - Neurologic assessment reveals normal neurological exam   - at this point no new or concerning features, red flags or other findings to suggest need for imaging at this time however, if any of these would occur would recommend MRI brain with without contrast      Preventative:  - we discussed headache hygiene and lifestyle factors that may improve headaches  - continue headache preventative supplements including continuing magnesium, riboflavin and butterbur  - past/failed:  Propranolol would be contraindicated due to asthma, cyproheptadine  - future options:  Discussed trying amitriptyline next, verapamil, topiramate, CGRP     Abortive:  - discussed not taking over-the-counter or prescription pain medications more than 3 days per week to prevent medication overuse/rebound headache  - trial of  Prochlorperazine for migraine or nausea abortive  Discussed proper use, possible side effects and risks  - trial of nurtec 75 mg  Discussed proper use, possible side effects and risks  - past/failed:  Sumatriptan 25 mg helps often but has side effects at affective doses, rizatriptan SE N/V  - future options:  Alternative Triptan, metoclopramide, prochlorperazine, Toradol IM or p o , could consider trial for 5 days of Depakote or dexamethasone 4 prolonged migraine, Aloma Minors, reyvow        Patient instructions      Headache/migraine treatment:   Abortive medications (for immediate treatment of a headache):    It is ok to take ibuprofen, acetaminophen or naproxen (Advil, Tylenol,  Aleve, Excedrin) if they help your headaches you should limit these to No more than 3 times a week to avoid medication overuse/rebound headaches       Trial of prochlorperazine 10 mg as needed for migraine or nausea   - can take with sumatriptan     Trial of nurtec 75 mg take at onset  Do not repeat in 24 hours         Over the counter preventive supplements for headaches/migraines   (to take every day to help prevent headaches - not to take at the time of headache):     [x]? ? Magnesium 400mg daily (If any diarrhea or upset stomach, decrease dose  as tolerated)  [x]? ? Riboflavin (Vitamin B2)  400mg daily  - try online   (FYI B2 may make your urine bright/neon yellow)  AND/OR   [x]? ? Herbal medication: [place]/Butterbur 150 mg daily - try online  (When choosing your Butterbur online or in the store, beware that there are some poor preps containing pyrrolizidine alkaloids (PAs) that can be harmful to the liver  Therefore, do not use butterbur products that are not certified and labeled as PA-free )     Prescription preventive medications for headaches/migraines   (to take every day to help prevent headaches - not to take at the time of headache):  [x]? ? will hold off for now      Lifestyle Recommendations:  [x]? ? SLEEP - Maintain a regular sleep schedule: Adults need at least 7-8 hours of uninterrupted a night  Maintain good sleep hygiene:  Going to bed and waking up at consistent times, avoiding excessive daytime naps, avoiding caffeinated beverages in the evening, avoid excessive stimulation in the evening and generally using bed primarily for sleeping   One hour before bedtime would recommend turning lights down lower, decreasing your activity (may read quietly, listen to music at a low volume)  When you get into bed, should eliminate all technology (no texting, emailing, playing with your phone, iPad or tablet in bed)  [x]?? HYDRATION - Maintain good hydration   Drink  2L of fluid a day (4 typical small water bottles)  [x]? ? DIET - Maintain good nutrition  In particular don't skip meals and try and eat healthy balanced meals regularly  [x]? ? TRIGGERS - Look for other triggers and avoid them: Limit caffeine to 1-2 cups a day or less   Avoid dietary triggers that you have noticed bring on your headaches (this could include aged cheese, peanuts, MSG, aspartame and nitrates)  [x]? ? EXERCISE - physical exercise as we all know is good for you in many ways, and not only is good for your heart, but also is beneficial for your mental health, cognitive health and  chronic pain/headaches  I would encourage at the least 5 days of physical exercise weekly for at least 30 minutes       Education and Follow-up  [x]? ? Please call with any questions or concerns  Of course if any new concerning symptoms go to the emergency department  [x]? ? Follow up 6 months  [place]            CC: We had the pleasure of evaluating Tobi Oliveira in neurological consultation today  Rachel Gagnon a  right handed male who presents today for evaluation of headaches       History obtained from patient as well as available medical record review  History of Present Illness:   Interval history as of 8/26/2021  - denies any new or concerning neurologic symptoms since last visit   - vasectomy since last visit   - had bad constipation     Headaches and migraines   Migraines unchanged at 1-10 per month, every 3-4 months will have a month with 10 days in a row   - March 4, April 8, May 8, June 1, July 10, August 10    - triggers storm, once when sick     Preventative:     Taking supplements    -Trial of cyproheptadine - did not like side effects, felt off     Abortive:   - Sumatriptan 25 mg - 95% works, 5% takes another, more than 50 mg gets nausea   - trial of prochlorperazine - has not tried     Interval history as of 2/26/2021  - no new or concerning symptoms     Headaches and migraines  He feels overall improved, less easily triggered, on average, 2 headaches per week, every 3-4 months cluster of 10 days in a row headaches     Preventative:  Taking supplements    -did not start-  Trial of cyproheptadine 4-8 mg nightly during seasonal changes for prevention      Abortive: sumatriptan 25 mg - works great 90% of time, and may need 2nd      tried cyproheptadine 1 time and had medication side effects because he took it during the day rather than at night as I recommended  Will start new medication     Interval history as of 10/14/2020:  - no new or concerning symptoms      Headaches and migraines   - anywhere from 1 per week, to every day, worse with changing seasons     Preventative:  Trial of headache preventative supplements, continuing magnesium, adding riboflavin and butterbur- tried 2 months  Abortive:   Rizatriptan side effects - nausea and vomiting, transition back to sumatriptan 25 mg usually works, occasionally takes 50      Headaches started at what age? 20years old  How often do the headaches occur?   - as of 7/23/2020: 2-3 times a week migraines for 5 years  What time of the day do the headaches start?  Around noon   How long do the headaches last? All day without meds, may come weeks at a time every other day   Are you ever headache free? Yes     Aura? without aura     Last eye exam: years ago      Where is your headache located and pain quality?   - typically bilateral  - temporal - sharp   What is the intensity of pain? Average: 8/10  Associated symptoms:   [x]? ? Nausea -occasionally      []? ? Vomiting        []? ?Venancio Kirks  []? ? Insomnia    []? ? Stiff or sore neck   [x]? ? Problems with concentration  [x]? ? Photophobia     [x]? ?Phonophobia      []? ? Osmophobia  []? ? Blurred vision   [x]? ? Prefer quiet, dark room  [x]? ? Light-headed or dizzy   - occasionally   []? ? Tinnitus   []? ? Hands or feet tingle or feel numb/paresthesias       []? ? Red ear      []? ? Ptosis      []? ? Facial droop  []? ? Lacrimation  []? ? Nasal congestion/rhinorrhea   []? ? Flushing of face  []? ? Change in pupil size     Number of days missed per month because of headaches:  Work (or school) days: 0     Things that make the headache worse? No specific movements, any movement     Headache triggers:   allergy season, asthma exacerbation, dehydration, skipping meals, working on [employer]     Have you seen someone else for headaches or pain? Yes, PCP  Have you had trigger point injection performed and how often? No  Have you had Botox injection performed and how often? No   Have you had epidural injections or transforaminal injections performed? No  Have you ever had any Brain imaging? no     What medications do you take or have you taken for your headaches? ABORTIVE:    OTC medications have been ineffective      Sumatriptan 25 mg - usually works 90%, but not always      PREVENTIVE:   -   Magnesium 500 mg - started around June 2020  B12      Past  Propranolol 60 mg - contraindicated due to asthma      Alternative therapies used in the past for headaches? no     LIFESTYLE  Sleep   - averages: 7-8 hours  Problems falling asleep?:   No  Problems staying asleep?:  No  - snores sometimes   - talks sometimes      Water: 4-5 per day  Caffeine: 0 per day     Mood:   Denies history of anxiety or depression or other diagnosed mood disorder     The following portions of the patient's history were reviewed and updated as appropriate: allergies, current medications, past family history, past medical history, past social history, past surgical history and problem list      Pertinent family history:  Family history of headaches:  migraine headaches in mother  Any family history of aneurysms - No  Dad depression, bro bipolar     Pertinent social history:  Work:    Lives with wife and 2 kids      Illicit Drugs: denies  Alcohol/tobacco: Denies alcohol use, Denies tobacco use    Past Medical History:     Past Medical History:   Diagnosis Date    Asthma     5/12/21 Pt states has not had any recent issues with asthma    Bleeding per rectum 32/46/8989    Eosinophilic esophagitis     GERD (gastroesophageal reflux disease)     History of migraine headaches     5/12/21 Pt states with migraine headaches approx 2x/week   Pt has family hx and also with seasonal weather changes  Patient Active Problem List   Diagnosis    Benign essential hypertension    Eosinophilic esophagitis    Acquired hypothyroidism    Migraine headache    Positive UGO (antinuclear antibody)    Gastro-esophageal reflux disease with esophagitis    Vertiginous migraine    Left shoulder pain    Overweight    Asthma in adult, moderate persistent, uncomplicated       Medications:      Current Outpatient Medications   Medication Sig Dispense Refill    multivitamin (THERAGRAN) TABS Take 1 tablet by mouth daily Mix of supplemental - Prevent migraine      pantoprazole (PROTONIX) 40 mg tablet Take 1 tablet (40 mg total) by mouth daily (Patient taking differently: Take 40 mg by mouth daily Takes in the am) 30 tablet 11    SUMAtriptan (IMITREX) 100 mg tablet Take 1 tablet (100 mg total) by mouth once as needed for migraine May repeat in 2 hours if needed  Max 2/24 hours, 3 days per week, 9 tabs/month 9 tablet 6    clobetasol (TEMOVATE) 0 05 % cream Apply topically 2 (two) times a day To affected areas on your hands as needed  (Patient not taking: Reported on 8/26/2021) 15 g 0    Rimegepant Sulfate (Nurtec) 75 MG TBDP Take one NURTEC 75 mg at onset under tongue  Limit 1 in 24 hours  8 a month  8 tablet 3    triamcinolone (KENALOG) 0 5 % ointment APPLY THIN COAT TO AFFECTED AREA TWICE A DAY (Patient not taking: Reported on 8/26/2021)      vitamin B-12 (VITAMIN B-12) 500 mcg tablet Take 500 mcg by mouth daily Pt reports last dose being on 5/9/21 prior to surgery (Patient not taking: Reported on 8/26/2021)       No current facility-administered medications for this visit  Allergies:       Allergies   Allergen Reactions    Beef-Derived Products - Food Allergy Throat Swelling     EOE    Lac Bovis Other (See Comments)     Milk allergy -eosinophilic esophagitis       Family History:     Family History   Problem Relation Age of Onset    Heart disease Family     Migraines Family     Migraines Mother     Hypertension Father     Hip dysplasia Brother        Social History:     Social History     Socioeconomic History    Marital status: /Civil Union     Spouse name: Not on file    Number of children: Not on file    Years of education: Not on file    Highest education level: Not on file   Occupational History    Not on file   Tobacco Use    Smoking status: Never Smoker    Smokeless tobacco: Never Used    Tobacco comment: non-smoker   Vaping Use    Vaping Use: Never used   Substance and Sexual Activity    Alcohol use: No     Comment: Denies    Drug use: No     Comment: Denies    Sexual activity: Yes     Partners: Female   Other Topics Concern    Not on file   Social History Narrative    Not on file     Social Determinants of Health     Financial Resource Strain:     Difficulty of Paying Living Expenses:    Food Insecurity:     Worried About Running Out of Food in the Last Year:     [address] in the Last Year:    Transportation Needs:     Lack of Transportation (Medical):      Lack of Transportation (Non-Medical):    Physical Activity:     Days of Exercise per Week:     Minutes of Exercise per Session:    Stress:     Feeling of Stress :    Social Connections:     Frequency of Communication with Friends and Family:     Frequency of Social Gatherings with Friends and Family:     Attends Jewish Services:     Active Member of Clubs or Organizations:     Attends Club or Organization Meetings:     Marital Status:    Intimate Partner Violence:     Fear of Current or Ex-Partner:     Emotionally Abused:     Physically Abused:     Sexually Abused:          Objective:       Physical Exam:                                                                 Vitals:            Constitutional:    /84 (BP Location: Left arm, Patient Position: Sitting, Cuff Size: Standard)   Pulse 70   Temp 97 5 °F (36 4 °C) (Temporal)   Ht 6' 1" (1 854 m)   Wt 85 8 kg (189 lb 3 2 oz) BMI 24 96 kg/m²   BP Readings from Last 3 Encounters:   08/26/21 122/84   06/21/21 118/76   06/18/21 112/68     Pulse Readings from Last 3 Encounters:   08/26/21 70   06/21/21 81   06/18/21 64         Well developed, well nourished, well groomed  No dysmorphic features  HEENT:  Normocephalic atraumatic  See neuro exam   Chest:  Respirations appear regular and unlabored  Cardiovascular:  no observed significant swelling  Musculoskeletal:  (see below under neurologic exam for evaluation of motor function and gait)   Skin:  warm and dry, not diaphoretic  Psychiatric:  Normal behavior and appropriate affect        Neurological Examination:     Mental status/cognitive function:   Recent and remote memory intact  Attention span and concentration as well as fund of knowledge are appropriate for age  Normal language and spontaneous speech  Cranial Nerves:  III, IV, VI-Pupils were equal, round  Extraocular movements were full and conjugate   VII-facial expression symmetric  VIII-hearing grossly intact bilaterally   Motor Exam: symmetric bulk throughout  no atrophy, fasciculations or abnormal movements noted  Coordination:  no apparent dysmetria, ataxia or tremor noted  Gait: steady casual gait      Pertinent lab results:   See EMR for recent labs  6/21/21 CMP   Unremarkable  TSH 5 25 (and going to follow up labs soon)  6/10/21 CBC with WBC 15 46  06/05/2020 CMP and CBC unremarkable  TSH normal     Imaging:   No head imaging in system    Review of Systems:   ROS obtained by medical assistant Personally reviewed and updated if indicated  I recommended PCP follow up for non neurologic problems  Review of Systems   Constitutional: Negative  HENT: Negative  Eyes: Negative  Respiratory: Negative  Cardiovascular: Negative  Gastrointestinal: Negative  Endocrine: Negative  Genitourinary: Negative  Musculoskeletal: Negative  Skin: Negative  Allergic/Immunologic: Negative  Neurological: Positive for headaches  Hematological: Negative  Psychiatric/Behavioral: Negative  I have spent 22 minutes with Patient  today in which greater than 50% of this time was spent in counseling/coordination  I also spent 11 minutes non face to face for this patient the same day         Author:  Josephine Bryan MD 8/26/2021 10:28 AM

## 2022-01-02 DIAGNOSIS — G43.709 CHRONIC MIGRAINE WITHOUT AURA WITHOUT STATUS MIGRAINOSUS, NOT INTRACTABLE: ICD-10-CM

## 2022-01-03 RX ORDER — SUMATRIPTAN 100 MG/1
100 TABLET, FILM COATED ORAL ONCE AS NEEDED
Qty: 9 TABLET | Refills: 6 | Status: SHIPPED | OUTPATIENT
Start: 2022-01-03

## 2022-01-07 DIAGNOSIS — K20.0 EOSINOPHILIC ESOPHAGITIS: ICD-10-CM

## 2022-01-07 DIAGNOSIS — K21.00 GASTROESOPHAGEAL REFLUX DISEASE WITH ESOPHAGITIS: ICD-10-CM

## 2022-01-09 RX ORDER — PANTOPRAZOLE SODIUM 40 MG/1
40 TABLET, DELAYED RELEASE ORAL DAILY
Qty: 30 TABLET | Refills: 0 | Status: SHIPPED | OUTPATIENT
Start: 2022-01-09 | End: 2022-01-10

## 2022-02-10 ENCOUNTER — TELEPHONE (OUTPATIENT)
Dept: NEUROLOGY | Facility: CLINIC | Age: 32
End: 2022-02-10

## 2022-02-10 NOTE — TELEPHONE ENCOUNTER
Called and left a voicemail for patient - Please call back to confirm upcoming appointment with Dr Jason Clark  Provided patient with apt date, time and location  Informed patient that check in is at least 15 minutes prior to apt time

## 2022-02-18 NOTE — TELEPHONE ENCOUNTER
Called and spoke to patient - he requested to cancel the apt  I offered to reschedule the apt, per patient, he does not wish to reschedule right now and will call back to reschedule

## 2022-04-07 DIAGNOSIS — K21.00 GASTROESOPHAGEAL REFLUX DISEASE WITH ESOPHAGITIS: ICD-10-CM

## 2022-04-07 DIAGNOSIS — K20.0 EOSINOPHILIC ESOPHAGITIS: ICD-10-CM

## 2022-04-07 RX ORDER — PANTOPRAZOLE SODIUM 40 MG/1
TABLET, DELAYED RELEASE ORAL
Qty: 90 TABLET | Refills: 0 | Status: SHIPPED | OUTPATIENT
Start: 2022-04-07

## 2022-06-30 ENCOUNTER — OFFICE VISIT (OUTPATIENT)
Dept: GASTROENTEROLOGY | Facility: CLINIC | Age: 32
End: 2022-06-30
Payer: COMMERCIAL

## 2022-06-30 VITALS
WEIGHT: 184.2 LBS | BODY MASS INDEX: 24.41 KG/M2 | DIASTOLIC BLOOD PRESSURE: 82 MMHG | SYSTOLIC BLOOD PRESSURE: 126 MMHG | HEART RATE: 74 BPM | HEIGHT: 73 IN

## 2022-06-30 DIAGNOSIS — K21.00 GASTROESOPHAGEAL REFLUX DISEASE WITH ESOPHAGITIS WITHOUT HEMORRHAGE: ICD-10-CM

## 2022-06-30 DIAGNOSIS — K20.0 EOSINOPHILIC ESOPHAGITIS: Primary | ICD-10-CM

## 2022-06-30 PROCEDURE — 99214 OFFICE O/P EST MOD 30 MIN: CPT | Performed by: PHYSICIAN ASSISTANT

## 2022-06-30 RX ORDER — PANTOPRAZOLE SODIUM 40 MG/1
40 TABLET, DELAYED RELEASE ORAL DAILY
Qty: 90 TABLET | Refills: 2 | Status: SHIPPED | OUTPATIENT
Start: 2022-06-30

## 2022-06-30 NOTE — PROGRESS NOTES
[facility]  Lelomelba Lim 27 y o  male MRN: [id number]       CC: Follow-up    HPI: Naz Rivera is a 66-year-old male known to us for history of GERD and eosinophilic esophagitis on chronic pantoprazole  He also has history of asthma  Patient is here to follow-up after he had signed a [employer] message stating that he had new onset consistent her urge to have a bowel movement  The bowel movements themselves he was reporting were thinner in consensus see the normal   He had a fecal impaction last year in the emergency room  He began taking psyllium husk, which improved the consistency of his stool and he is having less urgency to have a bowel movement  He denies having rectal bleeding, abdominal pain or unintentional weight loss  His wife has ulcerative colitis  He has no personal family history of IBD  Patient's last EGD was in March 2019 revealing changes suggesting GERD and eosinophilic esophagitis  Review of Systems:    CONSTITUTIONAL: Denies any fever, chills, or rigors  Good appetite, and no recent weight loss  HEENT: No earache or tinnitus  Denies hearing loss or visual disturbances  CARDIOVASCULAR: No chest pain or palpitations  RESPIRATORY: Denies any cough, hemoptysis, shortness of breath or dyspnea on exertion  GASTROINTESTINAL: As noted in the History of Present Illness  GENITOURINARY: No problems with urination  Denies any hematuria or dysuria  NEUROLOGIC: No dizziness or vertigo, denies headaches  MUSCULOSKELETAL: Denies any muscle or joint pain  SKIN: Denies skin rashes or itching  ENDOCRINE: Denies excessive thirst  Denies intolerance to heat or cold  PSYCHOSOCIAL: Denies depression or anxiety  Denies any recent memory loss         Current Outpatient Medications   Medication Sig Dispense Refill    pantoprazole (PROTONIX) 40 mg tablet TAKE 1 TABLET BY MOUTH EVERY DAY 90 tablet 0    SUMAtriptan (IMITREX) 100 mg tablet Take 1 tablet (100 mg total) by mouth once as needed for migraine May repeat in 2 hours if needed  Max 2/24 hours, 3 days per week, 9 tabs/month 9 tablet 6    clobetasol (TEMOVATE) 0 05 % cream Apply topically 2 (two) times a day To affected areas on your hands as needed  (Patient not taking: Reported on 8/26/2021) 15 g 0    multivitamin (THERAGRAN) TABS Take 1 tablet by mouth daily Mix of supplemental - Prevent migraine      triamcinolone (KENALOG) 0 5 % ointment APPLY THIN COAT TO AFFECTED AREA TWICE A DAY (Patient not taking: Reported on 8/26/2021)      vitamin B-12 (VITAMIN B-12) 500 mcg tablet Take 500 mcg by mouth daily Pt reports last dose being on 5/9/21 prior to surgery (Patient not taking: Reported on 8/26/2021)       No current facility-administered medications for this visit  Past Medical History:   Diagnosis Date    Asthma     5/12/21 Pt states has not had any recent issues with asthma    Bleeding per rectum 86/24/9053    Eosinophilic esophagitis     GERD (gastroesophageal reflux disease)     History of migraine headaches     5/12/21 Pt states with migraine headaches approx 2x/week  Pt has family hx and also with seasonal weather changes  Past Surgical History:   Procedure Laterality Date    CYSTOSCOPY      for urethral stricture    ESOPHAGOGASTRODUODENOSCOPY      AL ESOPHAGOGASTRODUODENOSCOPY TRANSORAL DIAGNOSTIC N/A 3/11/2019    Procedure: ESOPHAGOGASTRODUODENOSCOPY (EGD); Surgeon: Rich Kovacs DO;  Location: [facility];   Service: Gastroenterology    AL REMOVAL OF SPERM DUCT(S) Bilateral 5/14/2021    Procedure: Dorothea Self;  Surgeon: Irwin Valadez MD;  Location: [facility] MA[facility] OR;  Service: Urology    TONSILLECTOMY       Social History     Socioeconomic History    Marital status: /Civil Union     Spouse name: None    Number of children: None    Years of education: None    Highest education level: None   Occupational History    None   Tobacco Use    Smoking status: Never Smoker    Smokeless tobacco: Never Used    Tobacco comment: non-smoker   Vaping Use    Vaping Use: Never used   Substance and Sexual Activity    Alcohol use: No     Comment: Denies    Drug use: No     Comment: Denies    Sexual activity: Yes     Partners: Female   Other Topics Concern    None   Social History Narrative    None     Social Determinants of Health     Financial Resource Strain: Not on file   Food Insecurity: Not on file   Transportation Needs: Not on file   Physical Activity: Not on file   Stress: Not on file   Social Connections: Not on file   Intimate Partner Violence: Not on file   Housing Stability: Not on file     Family History   Problem Relation Age of Onset    Heart disease Family     Migraines Family     Migraines Mother     Hypertension Father     Hip dysplasia Brother             PHYSICAL EXAM:    Vitals:    06/30/22 1423   BP: 126/82   Pulse: 74   Weight: 83 6 kg (184 lb 3 2 oz)   Height: 6' 1" (1 854 m)     General Appearance:   Alert and oriented x 3  Cooperative, and in no respiratory distress   HEENT:   Normocephalic, atraumatic, anicteric      Neck:  Supple, symmetrical, trachea midline   Lungs:   Clear to auscultation bilaterally    Heart[de-identified]   Regular rate and rhythm   Abdomen:   Soft, non-tender, non-distended; normal bowel sounds; no masses, no organomegaly    Genitalia:   Deferred    Rectal:   Deferred    Extremities:  No cyanosis, clubbing or edema    Pulses:  2+ and symmetric all extremities    Skin:  Skin color, texture, turgor normal, no rashes or lesions    Lymph nodes:  No palpable cervical or supraclavicular lymphadenopathy        Lab Results:             Invalid input(s): LABALBU            Imaging Studies: I have personally reviewed pertinent imaging studies  CT abdomen pelvis with contrast    Result Date: 6/10/2021  Impression: Large bolus of stool in the rectosigmoid colon consistent with constipation   Marked urine filled distention of the urinary bladder resulting in mild fullness of renal collecting systems  Otherwise unremarkable examination  Workstation performed: [id number]       ASSESSMENT and PLAN:      1) Fecal urgency - Because patient's symptoms are acute, I advised him to continue fiber supplementation or to improve the consistency of his stool  If his fecal urgency continues or if he has further change in bowel habits, would suggest colonoscopy to investigate  He reports that both of his parents have history of benign colon polyps  He will confirm this with them  He will call us in August to let us know whether not his symptoms are persistent  2) Eosinophilic esophagitis, GERD - Stable on Protonix daily  In the future, Dupixent has been in the toxin being approved for EoE  Follow up in 4-6 weeks, instructed patient to call the office with his progress

## 2022-08-09 ENCOUNTER — OFFICE VISIT (OUTPATIENT)
Dept: INTERNAL MEDICINE CLINIC | Facility: CLINIC | Age: 32
End: 2022-08-09
Payer: COMMERCIAL

## 2022-08-09 VITALS
BODY MASS INDEX: 25.37 KG/M2 | SYSTOLIC BLOOD PRESSURE: 126 MMHG | DIASTOLIC BLOOD PRESSURE: 78 MMHG | TEMPERATURE: 97.5 F | HEIGHT: 73 IN | RESPIRATION RATE: 18 BRPM | HEART RATE: 90 BPM | WEIGHT: 191.4 LBS | OXYGEN SATURATION: 98 %

## 2022-08-09 DIAGNOSIS — S99.922D INJURY OF LEFT FOOT, SUBSEQUENT ENCOUNTER: Primary | ICD-10-CM

## 2022-08-09 PROCEDURE — 3725F SCREEN DEPRESSION PERFORMED: CPT | Performed by: INTERNAL MEDICINE

## 2022-08-09 PROCEDURE — 99213 OFFICE O/P EST LOW 20 MIN: CPT | Performed by: INTERNAL MEDICINE

## 2022-08-09 NOTE — PROGRESS NOTES
INTERNAL MEDICINE FOLLOW-UP OFFICE VISIT  [facility] [place]    NAME: Michael Toribio  AGE: 27 y o  SEX: male  : 1990   MRN: [id number]    DATE: 2022  TIME: 4:34 PM    Assessment and Plan     Diagnoses and all orders for this visit:    Injury of left foot, subsequent encounter  -     XR foot 3+ vw left; Future  -     Ambulatory Referral to Podiatry; Future        - Counseling Documentation: patient was counseled regarding: instructions for management, risk factor reductions, prognosis, patient and family education, risks and benefits of treatment options and importance of compliance with treatment  - Medication Side Effects: Adverse side effects of medications were reviewed with the patient/guardian today  Return to office in: as needed    Chief Complaint     Chief Complaint   Patient presents with    Toe Pain     Banged big toe on right foot, bruised and swollen  History of Present Illness     HPI  He injured his foot  2 days ago and now the left great toe is black and blue and hurting  The following portions of the patient's history were reviewed and updated as appropriate: allergies, current medications, past family history, past medical history, past social history, past surgical history and problem list     Review of Systems     Review of Systems   Constitutional: Negative for chills, diaphoresis, fatigue and fever  HENT: Negative for congestion, ear discharge, ear pain, hearing loss, postnasal drip, rhinorrhea, sinus pressure, sinus pain, sneezing, sore throat and voice change  Eyes: Negative for pain, discharge, redness and visual disturbance  Respiratory: Negative for cough, chest tightness, shortness of breath and wheezing  Cardiovascular: Negative for chest pain, palpitations and leg swelling  Gastrointestinal: Negative for abdominal distention, abdominal pain, blood in stool, constipation, diarrhea, nausea and vomiting     Endocrine: Negative for cold intolerance, heat intolerance, polydipsia, polyphagia and polyuria  Genitourinary: Negative for dysuria, flank pain, frequency, hematuria and urgency  Musculoskeletal: Negative for arthralgias, back pain, gait problem, joint swelling, myalgias, neck pain and neck stiffness  Has pain in the left great toe with bruising   Skin: Negative for rash  Neurological: Negative for dizziness, tremors, syncope, facial asymmetry, speech difficulty, weakness, light-headedness, numbness and headaches  Hematological: Does not bruise/bleed easily  Psychiatric/Behavioral: Negative for behavioral problems, confusion and sleep disturbance  The patient is not nervous/anxious  Active Problem List     Patient Active Problem List   Diagnosis    Benign essential hypertension    Eosinophilic esophagitis    Acquired hypothyroidism    Migraine headache    Positive UGO (antinuclear antibody)    Gastro-esophageal reflux disease with esophagitis    Vertiginous migraine    Left shoulder pain    Overweight    Asthma in adult, moderate persistent, uncomplicated       Objective     /78 (BP Location: Left arm, Patient Position: Sitting, Cuff Size: Standard)   Pulse 90   Temp 97 5 °F (36 4 °C) (Tympanic)   Resp 18   Ht 6' 1" (1 854 m)   Wt 86 8 kg (191 lb 6 4 oz)   SpO2 98%   BMI 25 25 kg/m²     Physical Exam  Constitutional:       General: He is not in acute distress  Appearance: He is well-developed  He is not diaphoretic  HENT:      Head: Normocephalic and atraumatic  Right Ear: External ear normal       Left Ear: External ear normal       Nose: Nose normal    Eyes:      General: No scleral icterus  Right eye: No discharge  Left eye: No discharge  Conjunctiva/sclera: Conjunctivae normal    Neck:      Thyroid: No thyromegaly  Vascular: No JVD  Trachea: No tracheal deviation  Cardiovascular:      Rate and Rhythm: Normal rate and regular rhythm  Heart sounds: Normal heart sounds  No murmur heard  No friction rub  No gallop  Pulmonary:      Effort: Pulmonary effort is normal  No respiratory distress  Breath sounds: Normal breath sounds  No wheezing or rales  Chest:      Chest wall: No tenderness  Abdominal:      General: Bowel sounds are normal  There is no distension  Palpations: Abdomen is soft  Tenderness: There is no abdominal tenderness  There is no guarding or rebound  Musculoskeletal:         General: Swelling, tenderness and signs of injury present  Normal range of motion  Cervical back: Normal range of motion and neck supple  Comments:  Left great toe with swelling, tenderness, bruising and restricted movement   Lymphadenopathy:      Cervical: No cervical adenopathy  Skin:     General: Skin is warm and dry  Findings: No erythema or rash  Neurological:      Mental Status: He is alert and oriented to person, place, and time  Cranial Nerves: No cranial nerve deficit  Motor: No abnormal muscle tone  Coordination: Coordination normal    Psychiatric:         Judgment: Judgment normal            Current Medications       Current Outpatient Medications:     pantoprazole (PROTONIX) 40 mg tablet, TAKE 1 TABLET BY MOUTH EVERY DAY, Disp: 90 tablet, Rfl: 0    pantoprazole (PROTONIX) 40 mg tablet, Take 1 tablet (40 mg total) by mouth daily, Disp: 90 tablet, Rfl: 2    SUMAtriptan (IMITREX) 100 mg tablet, Take 1 tablet (100 mg total) by mouth once as needed for migraine May repeat in 2 hours if needed   Max 2/24 hours, 3 days per week, 9 tabs/month, Disp: 9 tablet, Rfl: 6    Health Maintenance     Health Maintenance   Topic Date Due    Pneumococcal Vaccine: Pediatrics (0 to 5 Years) and At-Risk Patients (6 to 59 Years) (1 - PCV) Never done    BMI: Followup Plan  05/21/2021    COVID-19 Vaccine (3 - Booster for Mail'Inside series) 09/07/2021    Annual Physical  06/18/2022    Influenza Vaccine (1) 09/01/2022    BMI: Adult  06/30/2023    Depression Screening  08/09/2023    DTaP,Tdap,and Td Vaccines (2 - Td or Tdap) 06/18/2031    HIV Screening  Completed    Hepatitis C Screening  Completed    HIB Vaccine  Aged Out    Hepatitis B Vaccine  Aged Out    IPV Vaccine  Aged Out    Hepatitis A Vaccine  Aged Out    Meningococcal ACWY Vaccine  Aged Out    HPV Vaccine  Aged Out     Immunization History   Administered Date(s) Administered    COVID-19 PFIZER VACCINE 0 3 ML IM 03/17/2021, 04/07/2021    INFLUENZA 11/08/2020, 10/06/2021    Influenza Injectable, MDCK, Preservative Free, Quadrivalent, 0 5 mL 12/20/2018    Influenza Quadrivalent, 6-35 Months IM 11/10/2015, 11/07/2017    Tdap 06/18/2021         Caron King MD  [address] [facility] AT [facility] [place]

## 2023-01-15 DIAGNOSIS — G43.709 CHRONIC MIGRAINE WITHOUT AURA WITHOUT STATUS MIGRAINOSUS, NOT INTRACTABLE: ICD-10-CM

## 2023-01-16 RX ORDER — SUMATRIPTAN 100 MG/1
TABLET, FILM COATED ORAL
Qty: 9 TABLET | Refills: 6 | Status: SHIPPED | OUTPATIENT
Start: 2023-01-16

## 2023-02-16 ENCOUNTER — OFFICE VISIT (OUTPATIENT)
Dept: GASTROENTEROLOGY | Facility: CLINIC | Age: 33
End: 2023-02-16

## 2023-02-16 VITALS
BODY MASS INDEX: 25.08 KG/M2 | HEIGHT: 73 IN | WEIGHT: 189.2 LBS | DIASTOLIC BLOOD PRESSURE: 82 MMHG | SYSTOLIC BLOOD PRESSURE: 122 MMHG | HEART RATE: 88 BPM

## 2023-02-16 DIAGNOSIS — K20.0 EOSINOPHILIC ESOPHAGITIS: Primary | ICD-10-CM

## 2023-02-16 NOTE — PROGRESS NOTES
[facility] - Outpatient Follow-up Note  Amy Galicia 27 y o  male MRN: [id number]  Encounter: [id number]          ASSESSMENT AND PLAN:      1  Eosinophilic esophagitis  - He has a history of EoE and GERD which has been well managed with pantoprazole 40 mg daily but there is concern for long term side effects and possible decalcification occurring in his teeth per the dentist  - Discussed options for trialing pantoprazole every other day v going down to 20 mg daily v switching to pepcid daily and see if this controls his symptoms or can minimize PPI use  - Discussed that often patients can use PPI courses intermittently if they are able to have minimal symptoms while off of a PPI  - He will experiment with trying some of these alternatives and let me know if he has any problems  - He is taking additional calcium supplementation currently  - EGD in 2019 showed active EoE; no indication to repeat EGD at this point    ______________________________________________________________________    SUBJECTIVE:  Anjum Serrano is a pleasant 26 yo M presenting for routine follow-up of his EOE and GERD, wants to discuss possible medication changes  He has been on pantoprazole consistently for the past year and a half and this works really well for controlling his reflux symptoms as well as preventing dysphagia from EOE  He is wondering if he can try lower dose or what he can do to minimize long-term risks as he was at the dentist recently and they mention some decalcification of his teeth  He is worried about lower calcium absorption because of his PPI  He believes he has tried Pepcid in the past but found that he needed it regularly so he just stayed on the pantoprazole  He has not tried a lower dose of pantoprazole in the past   He is not having any dysphagia symptoms currently  He does have a history of food impaction in the past       REVIEW OF SYSTEMS IS OTHERWISE NEGATIVE        Historical Information   Past Medical History:   Diagnosis Date   • Asthma     5/12/21 Pt states has not had any recent issues with asthma   • Bleeding per rectum 53/43/1909   • Eosinophilic esophagitis    • GERD (gastroesophageal reflux disease)    • History of migraine headaches     5/12/21 Pt states with migraine headaches approx 2x/week  Pt has family hx and also with seasonal weather changes  Past Surgical History:   Procedure Laterality Date   • CYSTOSCOPY      for urethral stricture   • ESOPHAGOGASTRODUODENOSCOPY     • SC ESOPHAGOGASTRODUODENOSCOPY TRANSORAL DIAGNOSTIC N/A 3/11/2019    Procedure: ESOPHAGOGASTRODUODENOSCOPY (EGD); Surgeon: Oscar Wade DO;  Location: [facility];   Service: Gastroenterology   • SC VASECTOMY UNI/BI SPX W/POSTOP SEMEN EXAMS Bilateral 5/14/2021    Procedure: VASECTOMY;  Surgeon: Soraida Ham MD;  Location: [facility] MAIN OR;  Service: Urology   • TONSILLECTOMY       Social History   Social History     Substance and Sexual Activity   Alcohol Use No    Comment: Denies     Social History     Substance and Sexual Activity   Drug Use No    Comment: Denies     Social History     Tobacco Use   Smoking Status Never   Smokeless Tobacco Never   Tobacco Comments    non-smoker     Family History   Problem Relation Age of Onset   • Migraines Mother    • Hypertension Father    • No Known Problems Sister    • Hip dysplasia Brother    • No Known Problems Maternal Grandmother    • No Known Problems Maternal Grandfather    • No Known Problems Paternal Grandmother    • No Known Problems Paternal Grandfather    • No Known Problems Maternal Aunt    • No Known Problems Maternal Uncle    • No Known Problems Paternal Aunt    • No Known Problems Paternal Uncle    • No Known Problems Cousin    • Heart disease Family    • Migraines Family        Meds/Allergies       Current Outpatient Medications:   •  pantoprazole (PROTONIX) 40 mg tablet  •  SUMAtriptan (IMITREX) 100 mg tablet    Allergies   Allergen Reactions   • Beef-Derived Products - Food Allergy Throat Swelling     EOE   • Lac Bovis Other (See Comments)     Milk allergy -eosinophilic esophagitis           Objective     Blood pressure 122/82, pulse 88, height 6' 1" (1 854 m), weight 85 8 kg (189 lb 3 2 oz)  Body mass index is 24 96 kg/m²  PHYSICAL EXAM:      General Appearance:   Alert, cooperative, no distress   HEENT:   Normocephalic, atraumatic, anicteric      Neck:  Supple, symmetrical, trachea midline   Lungs:   Clear to auscultation bilaterally; no rales, rhonchi or wheezing; respirations unlabored    Heart[de-identified]   Regular rate and rhythm; no murmur, rub, or gallop  Abdomen:   Soft, non-tender, non-distended; normal bowel sounds; no masses, no organomegaly    Genitalia:   Deferred    Rectal:   Deferred    Extremities:  No cyanosis, clubbing or edema    Pulses:  2+ and symmetric    Skin:  No jaundice, rashes, or lesions    Lymph nodes:  No palpable cervical lymphadenopathy        Lab Results:   No visits with results within 1 Day(s) from this visit  Latest known visit with results is:   Appointment on 07/09/2021   Component Date Value   • Semen Volume 07/09/2021 2 6    • SPERM POST VASECTOMY 07/09/2021 Rare, Non-Motile (A)          Radiology Results:   No results found

## 2023-03-27 ENCOUNTER — OFFICE VISIT (OUTPATIENT)
Dept: INTERNAL MEDICINE CLINIC | Facility: CLINIC | Age: 33
End: 2023-03-27

## 2023-03-27 VITALS
HEIGHT: 73 IN | BODY MASS INDEX: 25.12 KG/M2 | OXYGEN SATURATION: 99 % | DIASTOLIC BLOOD PRESSURE: 82 MMHG | HEART RATE: 63 BPM | WEIGHT: 189.5 LBS | TEMPERATURE: 98 F | RESPIRATION RATE: 16 BRPM | SYSTOLIC BLOOD PRESSURE: 126 MMHG

## 2023-03-27 DIAGNOSIS — E03.9 ACQUIRED HYPOTHYROIDISM: ICD-10-CM

## 2023-03-27 DIAGNOSIS — Z00.00 ENCOUNTER FOR WELLNESS EXAMINATION IN ADULT: Primary | ICD-10-CM

## 2023-03-27 PROBLEM — E66.3 OVERWEIGHT: Status: RESOLVED | Noted: 2020-05-21 | Resolved: 2023-03-27

## 2023-03-27 NOTE — PROGRESS NOTES
HS ANNUAL PHYSICAL  [facility] - [facility] ELSA [facility]REITAS    NAME: Rosa Vargas  AGE: 27 y o  SEX: male  : 1990     DATE: 3/27/2023    Assessment and Plan     Problem List Items Addressed This Visit        Endocrine    Acquired hypothyroidism    Relevant Orders    CBC and differential    Comprehensive metabolic panel    Lipid panel    TSH, 3rd generation   Other Visit Diagnoses     Encounter for wellness examination in adult    -  Primary            · Patient Counseling:   --Nutrition: Stressed importance of moderation in sodium/caffeine intake, saturated fat and cholesterol, caloric balance, sufficient intake of fresh fruits, vegetables, fiber, calcium, iron, and 1 mg of folate supplement per day (for females capable of pregnancy)  --Discussed the issue of estrogen replacement, calcium supplement, and the daily use of baby aspirin  --Exercise: Stressed the importance of regular exercise  --Substance Abuse: Discussed cessation/primary prevention of tobacco, alcohol, or other drug use; driving or other dangerous activities under the influence; availability of treatment for abuse  --Sexuality: Discussed sexually transmitted diseases, partner selection, use of condoms, avoidance of unintended pregnancy  and contraceptive alternatives  --Injury prevention: Discussed safety belts, safety helmets, smoke detector, smoking near bedding or upholstery  --Dental health: Discussed importance of regular tooth brushing, flossing, and dental visits  --Immunizations reviewed  --After hours service discussed with patient  · Discussed benefits of screening   · BMI Counseling: Body mass index is 25 kg/m²  Discussed the patient's BMI with him  The BMI is normal     No follow-ups on file          Chief Complaint     Chief Complaint   Patient presents with   • Annual Exam       History of Present Illness     HPI    Well Adult Physical   Patient here for a comprehensive physical exam       Diet and Physical Activity  Diet: well balanced diet  Weight concerns: None, patient's BMI is between 18 5-24 9  Exercise: daily      Depression Screen  PHQ-2/9 Depression Screening    Little interest or pleasure in doing things: 0 - not at all  Feeling down, depressed, or hopeless: 0 - not at all          General Health  Hearing: Normal:  bilateral  Vision: no vision problems and wears glasses  Dental: regular dental visits    Reproductive Health  good      The following portions of the patient's history were reviewed and updated as appropriate: allergies, current medications, past family history, past medical history, past social history, past surgical history and problem list     Review of Systems     Review of Systems   Constitutional: Negative for chills, diaphoresis, fatigue and fever  HENT: Negative for congestion, ear discharge, ear pain, hearing loss, postnasal drip, rhinorrhea, sinus pressure, sinus pain, sneezing, sore throat and voice change  Eyes: Negative for pain, discharge, redness and visual disturbance  Respiratory: Negative for cough, chest tightness, shortness of breath and wheezing  Cardiovascular: Negative for chest pain, palpitations and leg swelling  Gastrointestinal: Negative for abdominal distention, abdominal pain, blood in stool, constipation, diarrhea, nausea and vomiting  Endocrine: Negative for cold intolerance, heat intolerance, polydipsia, polyphagia and polyuria  Genitourinary: Negative for dysuria, flank pain, frequency, hematuria and urgency  Musculoskeletal: Negative for arthralgias, back pain, gait problem, joint swelling, myalgias, neck pain and neck stiffness  Skin: Negative for rash  Neurological: Negative for dizziness, tremors, syncope, facial asymmetry, speech difficulty, weakness, light-headedness, numbness and headaches  Hematological: Does not bruise/bleed easily     Psychiatric/Behavioral: Negative for behavioral problems, confusion and sleep disturbance  The patient is not nervous/anxious  Past Medical History     Past Medical History:   Diagnosis Date   • Asthma     5/12/21 Pt states has not had any recent issues with asthma   • Bleeding per rectum 96/93/3978   • Eosinophilic esophagitis    • GERD (gastroesophageal reflux disease)    • History of migraine headaches     5/12/21 Pt states with migraine headaches approx 2x/week  Pt has family hx and also with seasonal weather changes  Past Surgical History     Past Surgical History:   Procedure Laterality Date   • CYSTOSCOPY      for urethral stricture   • ESOPHAGOGASTRODUODENOSCOPY     • WV ESOPHAGOGASTRODUODENOSCOPY TRANSORAL DIAGNOSTIC N/A 3/11/2019    Procedure: ESOPHAGOGASTRODUODENOSCOPY (EGD); Surgeon: Megan Casas DO;  Location: [facility];   Service: Gastroenterology   • WV VASECTOMY UNI/BI SPX W/POSTOP SEMEN EXAMS Bilateral 5/14/2021    Procedure: VASECTOMY;  Surgeon: Josette Andrade MD;  Location: [facility] MAIN OR;  Service: Urology   • TONSILLECTOMY         Social History     Social History     Socioeconomic History   • Marital status: /Civil Union     Spouse name: None   • Number of children: None   • Years of education: None   • Highest education level: None   Occupational History   • None   Tobacco Use   • Smoking status: Never   • Smokeless tobacco: Never   • Tobacco comments:     non-smoker   Vaping Use   • Vaping Use: Never used   Substance and Sexual Activity   • Alcohol use: No     Comment: Denies   • Drug use: No     Comment: Denies   • Sexual activity: Yes     Partners: Female   Other Topics Concern   • None   Social History Narrative   • None     Social Determinants of Health     Financial Resource Strain: Not on file   Food Insecurity: Not on file   Transportation Needs: Not on file   Physical Activity: Not on file   Stress: Not on file   Social Connections: Not on file   Intimate Partner Violence: Not on file   Housing Stability: Not on file "      Family History     Family History   Problem Relation Age of Onset   • Migraines Mother    • Hypertension Father    • No Known Problems Sister    • Hip dysplasia Brother    • No Known Problems Maternal Grandmother    • No Known Problems Maternal Grandfather    • No Known Problems Paternal Grandmother    • No Known Problems Paternal Grandfather    • No Known Problems Maternal Aunt    • No Known Problems Maternal Uncle    • No Known Problems Paternal Aunt    • No Known Problems Paternal Uncle    • No Known Problems Cousin    • Heart disease Family    • Migraines Family        Current Medications       Current Outpatient Medications:   •  pantoprazole (PROTONIX) 40 mg tablet, TAKE 1 TABLET BY MOUTH EVERY DAY, Disp: 90 tablet, Rfl: 0  •  SUMAtriptan (IMITREX) 100 mg tablet, TAKE 1 TAB BY MOUTH ONCE AS NEEDED FOR MIGRAINE MAY REPEAT IN 2 HOURS IF NEEDED  MAX 2/24 HOURS, 3 DAYS PER WEEK, 9 TABS/MONTH, Disp: 9 tablet, Rfl: 6     Allergies     Allergies   Allergen Reactions   • Beef-Derived Products - Food Allergy Throat Swelling     EOE   • Lac Bovis Other (See Comments)     Milk allergy -eosinophilic esophagitis       Objective     /82 (BP Location: Left arm, Patient Position: Sitting, Cuff Size: Standard)   Pulse 63   Temp 98 °F (36 7 °C) (Tympanic)   Resp 16   Ht 6' 1\" (1 854 m)   Wt 86 kg (189 lb 8 oz)   SpO2 99%   BMI 25 00 kg/m²      Physical Exam  Constitutional:       General: He is not in acute distress  Appearance: He is well-developed  He is not diaphoretic  HENT:      Head: Normocephalic and atraumatic  Right Ear: External ear normal       Left Ear: External ear normal       Nose: Nose normal    Eyes:      General: No scleral icterus  Right eye: No discharge  Left eye: No discharge  Conjunctiva/sclera: Conjunctivae normal    Neck:      Thyroid: No thyromegaly  Vascular: No JVD  Trachea: No tracheal deviation     Cardiovascular:      Rate and Rhythm: " Normal rate and regular rhythm  Heart sounds: Normal heart sounds  No murmur heard  No friction rub  No gallop  Pulmonary:      Effort: Pulmonary effort is normal  No respiratory distress  Breath sounds: Normal breath sounds  No wheezing or rales  Chest:      Chest wall: No tenderness  Abdominal:      General: Bowel sounds are normal  There is no distension  Palpations: Abdomen is soft  Tenderness: There is no abdominal tenderness  There is no guarding or rebound  Musculoskeletal:         General: No tenderness  Normal range of motion  Cervical back: Normal range of motion and neck supple  Lymphadenopathy:      Cervical: No cervical adenopathy  Skin:     General: Skin is warm and dry  Findings: No erythema or rash  Neurological:      Mental Status: He is alert and oriented to person, place, and time  Cranial Nerves: No cranial nerve deficit  Motor: No abnormal muscle tone  Coordination: Coordination normal    Psychiatric:         Judgment: Judgment normal            No results found      Health Maintenance     Health Maintenance   Topic Date Due   • Pneumococcal Vaccine: Pediatrics (0 to 5 Years) and At-Risk Patients (6 to 59 Years) (1 - PCV) Never done   • BMI: Followup Plan  05/21/2021   • Influenza Vaccine (1) 09/01/2022   • Depression Screening  08/09/2023   • BMI: Adult  02/16/2024   • Annual Physical  03/27/2024   • DTaP,Tdap,and Td Vaccines (2 - Td or Tdap) 06/18/2031   • HIV Screening  Completed   • Hepatitis C Screening  Completed   • COVID-19 Vaccine  Completed   • HIB Vaccine  Aged Out   • IPV Vaccine  Aged Out   • Hepatitis A Vaccine  Aged Out   • Meningococcal ACWY Vaccine  Aged Out   • HPV Vaccine  Aged Out     Immunization History   Administered Date(s) Administered   • COVID-19 PFIZER VACCINE 0 3 ML IM 03/17/2021, 04/07/2021   • COVID-19 Pfizer Vac BIVALENT Maciel-sucrose 12 Yr+ IM (BOOSTER ONLY) 09/21/2022   • INFLUENZA 11/08/2020, 10/06/2021   • Influenza Injectable, MDCK, Preservative Free, Quadrivalent, 0 5 mL 12/20/2018   • Influenza Quadrivalent, 6-35 Months IM 11/10/2015, 11/07/2017   • Tdap 06/18/2021       Eladio , MD Claudio Bush

## 2023-04-29 LAB
ALBUMIN SERPL-MCNC: 4.7 G/DL (ref 3.6–5.1)
ALBUMIN/GLOB SERPL: 2 (CALC) (ref 1–2.5)
ALP SERPL-CCNC: 87 U/L (ref 36–130)
ALT SERPL-CCNC: 23 U/L (ref 9–46)
AST SERPL-CCNC: 27 U/L (ref 10–40)
BASOPHILS # BLD AUTO: 52 CELLS/UL (ref 0–200)
BASOPHILS NFR BLD AUTO: 1 %
BILIRUB SERPL-MCNC: 0.8 MG/DL (ref 0.2–1.2)
BUN SERPL-MCNC: 9 MG/DL (ref 7–25)
BUN/CREAT SERPL: NORMAL (CALC) (ref 6–22)
CALCIUM SERPL-MCNC: 9.7 MG/DL (ref 8.6–10.3)
CHLORIDE SERPL-SCNC: 101 MMOL/L (ref 98–110)
CHOLEST SERPL-MCNC: 157 MG/DL
CHOLEST/HDLC SERPL: 3 (CALC)
CO2 SERPL-SCNC: 29 MMOL/L (ref 20–32)
CREAT SERPL-MCNC: 0.71 MG/DL (ref 0.6–1.26)
EOSINOPHIL # BLD AUTO: 42 CELLS/UL (ref 15–500)
EOSINOPHIL NFR BLD AUTO: 0.8 %
ERYTHROCYTE [DISTWIDTH] IN BLOOD BY AUTOMATED COUNT: 11.6 % (ref 11–15)
GFR/BSA.PRED SERPLBLD CYS-BASED-ARV: 123 ML/MIN/1.73M2
GLOBULIN SER CALC-MCNC: 2.4 G/DL (CALC) (ref 1.9–3.7)
GLUCOSE SERPL-MCNC: 89 MG/DL (ref 65–99)
HCT VFR BLD AUTO: 43.5 % (ref 38.5–50)
HDLC SERPL-MCNC: 53 MG/DL
HGB BLD-MCNC: 14.8 G/DL (ref 13.2–17.1)
LDLC SERPL CALC-MCNC: 89 MG/DL (CALC)
LYMPHOCYTES # BLD AUTO: 1565 CELLS/UL (ref 850–3900)
LYMPHOCYTES NFR BLD AUTO: 30.1 %
MCH RBC QN AUTO: 29.7 PG (ref 27–33)
MCHC RBC AUTO-ENTMCNC: 34 G/DL (ref 32–36)
MCV RBC AUTO: 87.2 FL (ref 80–100)
MONOCYTES # BLD AUTO: 520 CELLS/UL (ref 200–950)
MONOCYTES NFR BLD AUTO: 10 %
NEUTROPHILS # BLD AUTO: 3021 CELLS/UL (ref 1500–7800)
NEUTROPHILS NFR BLD AUTO: 58.1 %
NONHDLC SERPL-MCNC: 104 MG/DL (CALC)
PLATELET # BLD AUTO: 298 THOUSAND/UL (ref 140–400)
PMV BLD REES-ECKER: 11.7 FL (ref 7.5–12.5)
POTASSIUM SERPL-SCNC: 4.2 MMOL/L (ref 3.5–5.3)
PROT SERPL-MCNC: 7.1 G/DL (ref 6.1–8.1)
RBC # BLD AUTO: 4.99 MILLION/UL (ref 4.2–5.8)
SODIUM SERPL-SCNC: 139 MMOL/L (ref 135–146)
TRIGL SERPL-MCNC: 66 MG/DL
TSH SERPL-ACNC: 4.65 MIU/L (ref 0.4–4.5)
WBC # BLD AUTO: 5.2 THOUSAND/UL (ref 3.8–10.8)

## 2023-05-01 ENCOUNTER — TELEPHONE (OUTPATIENT)
Age: 33
End: 2023-05-01

## 2023-05-01 NOTE — TELEPHONE ENCOUNTER
----- Message from Elizabeth Michaels DO sent at 4/29/2023  8:21 AM EDT -----  Lab work is stable  No concerns noted  Repeat labs in 1 year

## 2023-06-16 ENCOUNTER — OFFICE VISIT (OUTPATIENT)
Age: 33
End: 2023-06-16
Payer: COMMERCIAL

## 2023-06-16 VITALS
OXYGEN SATURATION: 98 % | DIASTOLIC BLOOD PRESSURE: 86 MMHG | WEIGHT: 187 LBS | BODY MASS INDEX: 24.78 KG/M2 | HEART RATE: 78 BPM | SYSTOLIC BLOOD PRESSURE: 124 MMHG | HEIGHT: 73 IN

## 2023-06-16 DIAGNOSIS — I10 BENIGN ESSENTIAL HYPERTENSION: Primary | ICD-10-CM

## 2023-06-16 DIAGNOSIS — E03.9 ACQUIRED HYPOTHYROIDISM: ICD-10-CM

## 2023-06-16 DIAGNOSIS — N06.9 ISOLATED PROTEINURIA WITH MORPHOLOGIC LESION: ICD-10-CM

## 2023-06-16 DIAGNOSIS — K21.00 GASTROESOPHAGEAL REFLUX DISEASE WITH ESOPHAGITIS WITHOUT HEMORRHAGE: ICD-10-CM

## 2023-06-16 DIAGNOSIS — G43.009 MIGRAINE WITHOUT AURA AND WITHOUT STATUS MIGRAINOSUS, NOT INTRACTABLE: ICD-10-CM

## 2023-06-16 PROBLEM — R80.0 ISOLATED PROTEINURIA: Status: ACTIVE | Noted: 2023-06-16

## 2023-06-16 PROBLEM — M25.512 LEFT SHOULDER PAIN: Status: RESOLVED | Noted: 2020-04-02 | Resolved: 2023-06-16

## 2023-06-16 PROCEDURE — 99214 OFFICE O/P EST MOD 30 MIN: CPT | Performed by: INTERNAL MEDICINE

## 2023-06-19 DIAGNOSIS — N06.9 ISOLATED PROTEINURIA WITH MORPHOLOGIC LESION: Primary | ICD-10-CM

## 2023-06-20 ENCOUNTER — HOSPITAL ENCOUNTER (OUTPATIENT)
Dept: ULTRASOUND IMAGING | Facility: HOSPITAL | Age: 33
Discharge: HOME | End: 2023-06-20
Attending: INTERNAL MEDICINE
Payer: COMMERCIAL

## 2023-06-20 ENCOUNTER — APPOINTMENT (OUTPATIENT)
Dept: LAB | Facility: HOSPITAL | Age: 33
End: 2023-06-20
Attending: INTERNAL MEDICINE
Payer: COMMERCIAL

## 2023-06-20 DIAGNOSIS — N06.9 ISOLATED PROTEINURIA WITH MORPHOLOGIC LESION: ICD-10-CM

## 2023-06-20 DIAGNOSIS — E03.9 ACQUIRED HYPOTHYROIDISM: ICD-10-CM

## 2023-06-20 DIAGNOSIS — I10 BENIGN ESSENTIAL HYPERTENSION: ICD-10-CM

## 2023-06-20 LAB
ALBUMIN SERPL BCP-MCNC: 5 G/DL (ref 3.5–5)
ALP SERPL-CCNC: 90 U/L (ref 34–104)
ALT SERPL W P-5'-P-CCNC: 19 U/L (ref 7–52)
ANION GAP SERPL CALCULATED.3IONS-SCNC: 5 MMOL/L
AST SERPL W P-5'-P-CCNC: 21 U/L (ref 13–39)
BACTERIA UR QL AUTO: ABNORMAL /HPF
BASOPHILS # BLD AUTO: 0.05 THOUSANDS/ÂΜL (ref 0–0.1)
BASOPHILS NFR BLD AUTO: 1 % (ref 0–1)
BILIRUB SERPL-MCNC: 1.04 MG/DL (ref 0.2–1)
BILIRUB UR QL STRIP: NEGATIVE
BUN SERPL-MCNC: 10 MG/DL (ref 5–25)
C3 SERPL-MCNC: 116 MG/DL (ref 90–180)
C4 SERPL-MCNC: 21 MG/DL (ref 10–40)
CALCIUM SERPL-MCNC: 9.9 MG/DL (ref 8.4–10.2)
CHLORIDE SERPL-SCNC: 104 MMOL/L (ref 96–108)
CHOLEST SERPL-MCNC: 155 MG/DL
CLARITY UR: CLEAR
CO2 SERPL-SCNC: 31 MMOL/L (ref 21–32)
COLOR UR: COLORLESS
CREAT SERPL-MCNC: 0.71 MG/DL (ref 0.6–1.3)
CREAT UR-MCNC: <13 MG/DL
EOSINOPHIL # BLD AUTO: 0.02 THOUSAND/ÂΜL (ref 0–0.61)
EOSINOPHIL NFR BLD AUTO: 0 % (ref 0–6)
ERYTHROCYTE [DISTWIDTH] IN BLOOD BY AUTOMATED COUNT: 11.7 % (ref 11.6–15.1)
GFR SERPL CREATININE-BSD FRML MDRD: 121 ML/MIN/1.73SQ M
GLUCOSE P FAST SERPL-MCNC: 92 MG/DL (ref 65–99)
GLUCOSE UR STRIP-MCNC: NEGATIVE MG/DL
HBV SURFACE AG SER QL: NORMAL
HCT VFR BLD AUTO: 45.5 % (ref 36.5–49.3)
HCV AB SER QL: NORMAL
HDLC SERPL-MCNC: 48 MG/DL
HGB BLD-MCNC: 15 G/DL (ref 12–17)
HGB UR QL STRIP.AUTO: NEGATIVE
IMM GRANULOCYTES # BLD AUTO: 0.02 THOUSAND/UL (ref 0–0.2)
IMM GRANULOCYTES NFR BLD AUTO: 0 % (ref 0–2)
KETONES UR STRIP-MCNC: NEGATIVE MG/DL
LDLC SERPL CALC-MCNC: 94 MG/DL (ref 0–100)
LEUKOCYTE ESTERASE UR QL STRIP: NEGATIVE
LYMPHOCYTES # BLD AUTO: 1.17 THOUSANDS/ÂΜL (ref 0.6–4.47)
LYMPHOCYTES NFR BLD AUTO: 23 % (ref 14–44)
MCH RBC QN AUTO: 29.5 PG (ref 26.8–34.3)
MCHC RBC AUTO-ENTMCNC: 33 G/DL (ref 31.4–37.4)
MCV RBC AUTO: 89 FL (ref 82–98)
MICROALBUMIN UR-MCNC: 9.8 MG/L (ref 0–20)
MICROALBUMIN/CREAT 24H UR: >75 MG/G CREATININE (ref 0–30)
MONOCYTES # BLD AUTO: 0.38 THOUSAND/ÂΜL (ref 0.17–1.22)
MONOCYTES NFR BLD AUTO: 7 % (ref 4–12)
NEUTROPHILS # BLD AUTO: 3.57 THOUSANDS/ÂΜL (ref 1.85–7.62)
NEUTS SEG NFR BLD AUTO: 69 % (ref 43–75)
NITRITE UR QL STRIP: NEGATIVE
NON-SQ EPI CELLS URNS QL MICRO: ABNORMAL /HPF
NONHDLC SERPL-MCNC: 107 MG/DL
NRBC BLD AUTO-RTO: 0 /100 WBCS
PH UR STRIP.AUTO: 7 [PH]
PLATELET # BLD AUTO: 280 THOUSANDS/UL (ref 149–390)
PMV BLD AUTO: 10.7 FL (ref 8.9–12.7)
POTASSIUM SERPL-SCNC: 4.1 MMOL/L (ref 3.5–5.3)
PROT SERPL-MCNC: 7.7 G/DL (ref 6.4–8.4)
PROT UR STRIP-MCNC: NEGATIVE MG/DL
RBC # BLD AUTO: 5.09 MILLION/UL (ref 3.88–5.62)
RBC #/AREA URNS AUTO: ABNORMAL /HPF
SODIUM SERPL-SCNC: 140 MMOL/L (ref 135–147)
SP GR UR STRIP.AUTO: 1 (ref 1–1.03)
TRIGL SERPL-MCNC: 65 MG/DL
TSH SERPL DL<=0.05 MIU/L-ACNC: 4.03 UIU/ML (ref 0.45–4.5)
UROBILINOGEN UR STRIP-ACNC: <2 MG/DL
WBC # BLD AUTO: 5.21 THOUSAND/UL (ref 4.31–10.16)
WBC #/AREA URNS AUTO: ABNORMAL /HPF

## 2023-06-20 PROCEDURE — 84165 PROTEIN E-PHORESIS SERUM: CPT

## 2023-06-20 PROCEDURE — 82043 UR ALBUMIN QUANTITATIVE: CPT

## 2023-06-20 PROCEDURE — 84443 ASSAY THYROID STIM HORMONE: CPT

## 2023-06-20 PROCEDURE — 85025 COMPLETE CBC W/AUTO DIFF WBC: CPT

## 2023-06-20 PROCEDURE — 86702 HIV-2 ANTIBODY: CPT

## 2023-06-20 PROCEDURE — 86225 DNA ANTIBODY NATIVE: CPT

## 2023-06-20 PROCEDURE — 86701 HIV-1ANTIBODY: CPT

## 2023-06-20 PROCEDURE — 86038 ANTINUCLEAR ANTIBODIES: CPT

## 2023-06-20 PROCEDURE — G0432 EIA HIV-1/HIV-2 SCREEN: HCPCS

## 2023-06-20 PROCEDURE — 84166 PROTEIN E-PHORESIS/URINE/CSF: CPT | Performed by: INTERNAL MEDICINE

## 2023-06-20 PROCEDURE — 76770 US EXAM ABDO BACK WALL COMP: CPT

## 2023-06-20 PROCEDURE — 82595 ASSAY OF CRYOGLOBULIN: CPT

## 2023-06-20 PROCEDURE — 87390 HIV-1 AG IA: CPT

## 2023-06-20 PROCEDURE — 87389 HIV-1 AG W/HIV-1&-2 AB AG IA: CPT

## 2023-06-20 PROCEDURE — 80061 LIPID PANEL: CPT

## 2023-06-20 PROCEDURE — 87340 HEPATITIS B SURFACE AG IA: CPT

## 2023-06-20 PROCEDURE — 81001 URINALYSIS AUTO W/SCOPE: CPT | Performed by: INTERNAL MEDICINE

## 2023-06-20 PROCEDURE — 36415 COLL VENOUS BLD VENIPUNCTURE: CPT

## 2023-06-20 PROCEDURE — 80053 COMPREHEN METABOLIC PANEL: CPT

## 2023-06-20 PROCEDURE — 86803 HEPATITIS C AB TEST: CPT

## 2023-06-20 PROCEDURE — 82570 ASSAY OF URINE CREATININE: CPT

## 2023-06-20 PROCEDURE — 86160 COMPLEMENT ANTIGEN: CPT

## 2023-06-21 LAB
HIV 1+2 AB+HIV1 P24 AG SERPL QL IA: REACTIVE
HIV 2 AB SERPL QL IA: ABNORMAL
HIV1 AB SERPL QL IA: REACTIVE
HIV1 P24 AG SERPL QL IA: ABNORMAL

## 2023-06-22 ENCOUNTER — APPOINTMENT (OUTPATIENT)
Dept: LAB | Facility: HOSPITAL | Age: 33
End: 2023-06-22
Payer: COMMERCIAL

## 2023-06-22 DIAGNOSIS — N06.9 ISOLATED PROTEINURIA WITH MORPHOLOGIC LESION: ICD-10-CM

## 2023-06-22 LAB
ALBUMIN SERPL ELPH-MCNC: 4.69 G/DL (ref 3.2–5.1)
ALBUMIN SERPL ELPH-MCNC: 64.3 % (ref 48–70)
ALBUMIN UR ELPH-MCNC: 100 %
ALPHA1 GLOB MFR UR ELPH: 0 %
ALPHA1 GLOB SERPL ELPH-MCNC: 0.28 G/DL (ref 0.15–0.47)
ALPHA1 GLOB SERPL ELPH-MCNC: 3.9 % (ref 1.8–7)
ALPHA2 GLOB MFR UR ELPH: 0 %
ALPHA2 GLOB SERPL ELPH-MCNC: 0.62 G/DL (ref 0.42–1.04)
ALPHA2 GLOB SERPL ELPH-MCNC: 8.5 % (ref 5.9–14.9)
B-GLOBULIN MFR UR ELPH: 0 %
BETA GLOB ABNORMAL SERPL ELPH-MCNC: 0.43 G/DL (ref 0.31–0.57)
BETA1 GLOB SERPL ELPH-MCNC: 5.9 % (ref 4.7–7.7)
BETA2 GLOB SERPL ELPH-MCNC: 4.9 % (ref 3.1–7.9)
BETA2+GAMMA GLOB SERPL ELPH-MCNC: 0.36 G/DL (ref 0.2–0.58)
CREAT 24H UR-MRATE: 1.6 G/24HR (ref 0.8–1.8)
DSDNA AB SER-ACNC: 3 IU/ML (ref 0–9)
GAMMA GLOB ABNORMAL SERPL ELPH-MCNC: 0.91 G/DL (ref 0.4–1.66)
GAMMA GLOB MFR UR ELPH: 0 %
GAMMA GLOB SERPL ELPH-MCNC: 12.5 % (ref 6.9–22.3)
IGG/ALB SER: 1.8 {RATIO} (ref 1.1–1.8)
MICROALBUMIN 24H UR-MRATE: 53 MG/24 HRS (ref 0–30)
MICROALBUMIN UR-MCNC: 28.1 MG/L (ref 0–20)
PROT 24H UR-MCNC: 228 MG/24 HRS
PROT PATTERN SERPL ELPH-IMP: NORMAL
PROT PATTERN UR ELPH-IMP: NORMAL
PROT SERPL-MCNC: 7.3 G/DL (ref 6.4–8.2)
PROT UR-MCNC: 6 MG/DL
SPECIMEN VOL UR: 1900 ML

## 2023-06-22 PROCEDURE — 82570 ASSAY OF URINE CREATININE: CPT

## 2023-06-22 PROCEDURE — 84166 PROTEIN E-PHORESIS/URINE/CSF: CPT | Performed by: STUDENT IN AN ORGANIZED HEALTH CARE EDUCATION/TRAINING PROGRAM

## 2023-06-22 PROCEDURE — 84165 PROTEIN E-PHORESIS SERUM: CPT | Performed by: STUDENT IN AN ORGANIZED HEALTH CARE EDUCATION/TRAINING PROGRAM

## 2023-06-22 PROCEDURE — 84156 ASSAY OF PROTEIN URINE: CPT

## 2023-06-22 PROCEDURE — 82043 UR ALBUMIN QUANTITATIVE: CPT

## 2023-06-23 LAB — ANA TITR SER IF: NEGATIVE {TITER}

## 2023-06-27 LAB — CRYOGLOB SER QL 1D COLD INC: NORMAL

## 2023-06-30 ENCOUNTER — TELEPHONE (OUTPATIENT)
Age: 33
End: 2023-06-30

## 2023-07-01 DIAGNOSIS — Z21 HIV TEST POSITIVE (HCC): Primary | ICD-10-CM

## 2023-07-03 ENCOUNTER — TELEPHONE (OUTPATIENT)
Dept: NEPHROLOGY | Facility: CLINIC | Age: 33
End: 2023-07-03

## 2023-07-03 ENCOUNTER — TELEPHONE (OUTPATIENT)
Age: 33
End: 2023-07-03

## 2023-07-03 ENCOUNTER — APPOINTMENT (OUTPATIENT)
Dept: LAB | Facility: HOSPITAL | Age: 33
End: 2023-07-03
Payer: COMMERCIAL

## 2023-07-03 DIAGNOSIS — N06.9 ISOLATED PROTEINURIA WITH MORPHOLOGIC LESION: Primary | ICD-10-CM

## 2023-07-03 DIAGNOSIS — N06.9 ISOLATED PROTEINURIA WITH MORPHOLOGIC LESION: ICD-10-CM

## 2023-07-03 PROCEDURE — 36415 COLL VENOUS BLD VENIPUNCTURE: CPT

## 2023-07-03 PROCEDURE — 86701 HIV-1ANTIBODY: CPT

## 2023-07-03 PROCEDURE — 86702 HIV-2 ANTIBODY: CPT

## 2023-07-03 PROCEDURE — 87538 HIV-2 PROBE&REVRSE TRNSCRIPJ: CPT

## 2023-07-03 PROCEDURE — G0432 EIA HIV-1/HIV-2 SCREEN: HCPCS

## 2023-07-03 PROCEDURE — 87535 HIV-1 PROBE&REVERSE TRNSCRPJ: CPT

## 2023-07-03 NOTE — TELEPHONE ENCOUNTER
Kirill Perez,   can you please review chart and advise. Patient called office LM requesting as sooner appt, he states he had recent labs done on 06/22/23 with his pcp who advised patient to call our office and get sooner appt.

## 2023-07-03 NOTE — TELEPHONE ENCOUNTER
Called spoke with patient advised of new schedule consult appt with MAY Medina, for 08/01/23 @ 3:00pm along with labs done 1 weeks prior to scheduled appt. patient understood and is okay with it.

## 2023-07-03 NOTE — TELEPHONE ENCOUNTER
Good Afternoon,    Dr. Amelie Huang patient has an appointment with you for October for Nephrology Consult ref by Dr. Bre Scanlon for Isolated proteinuria with morphologic lesion-. Patient stated he had labs done and as per labs patient stated he was advised to call the office for a sooner appt. Can you please review chart to advised how soon the patient can be seen and if it is okay for patient to be seen by [place].     Please Advised!!!!

## 2023-07-03 NOTE — TELEPHONE ENCOUNTER
----- Message from Wes Meyer MD sent at 7/3/2023  8:59 AM EDT -----  Ultrasound of the kidneys normal

## 2023-07-07 ENCOUNTER — TELEPHONE (OUTPATIENT)
Age: 33
End: 2023-07-07

## 2023-07-07 LAB
HGB FRACT BLD-IMP: NEGATIVE
HIV 2 RNA SERPL QL NAA+PROBE: NON REACTIVE
HIV IA ALGORITHM INTERP SERPLBLD-IMP: NORMAL
HIV1 AB SERPL QL IA: NON REACTIVE
HIV1 RNA SERPL QL NAA+PROBE: NON REACTIVE
SL AMB HIV 2 AB: NON REACTIVE

## 2023-07-21 ENCOUNTER — TELEPHONE (OUTPATIENT)
Dept: NEPHROLOGY | Facility: CLINIC | Age: 33
End: 2023-07-21

## 2023-07-22 ENCOUNTER — APPOINTMENT (OUTPATIENT)
Age: 33
End: 2023-07-22
Payer: COMMERCIAL

## 2023-07-22 DIAGNOSIS — N06.9 ISOLATED PROTEINURIA WITH MORPHOLOGIC LESION: ICD-10-CM

## 2023-07-22 LAB
ANION GAP SERPL CALCULATED.3IONS-SCNC: 3 MMOL/L
BACTERIA UR QL AUTO: NORMAL /HPF
BILIRUB UR QL STRIP: NEGATIVE
BUN SERPL-MCNC: 10 MG/DL (ref 5–25)
CALCIUM SERPL-MCNC: 9.2 MG/DL (ref 8.3–10.1)
CHLORIDE SERPL-SCNC: 110 MMOL/L (ref 96–108)
CLARITY UR: CLEAR
CO2 SERPL-SCNC: 29 MMOL/L (ref 21–32)
COLOR UR: COLORLESS
CREAT SERPL-MCNC: 0.78 MG/DL (ref 0.6–1.3)
CREAT UR-MCNC: 42.8 MG/DL
GFR SERPL CREATININE-BSD FRML MDRD: 116 ML/MIN/1.73SQ M
GLUCOSE P FAST SERPL-MCNC: 93 MG/DL (ref 65–99)
GLUCOSE UR STRIP-MCNC: NEGATIVE MG/DL
HGB UR QL STRIP.AUTO: NEGATIVE
KETONES UR STRIP-MCNC: NEGATIVE MG/DL
LEUKOCYTE ESTERASE UR QL STRIP: NEGATIVE
MICROALBUMIN UR-MCNC: 27.3 MG/L (ref 0–20)
MICROALBUMIN/CREAT 24H UR: 64 MG/G CREATININE (ref 0–30)
NITRITE UR QL STRIP: NEGATIVE
NON-SQ EPI CELLS URNS QL MICRO: NORMAL /HPF
PH UR STRIP.AUTO: 6.5 [PH]
POTASSIUM SERPL-SCNC: 4.1 MMOL/L (ref 3.5–5.3)
PROT UR STRIP-MCNC: NEGATIVE MG/DL
RBC #/AREA URNS AUTO: NORMAL /HPF
SODIUM SERPL-SCNC: 142 MMOL/L (ref 135–147)
SP GR UR STRIP.AUTO: 1.01 (ref 1–1.03)
UROBILINOGEN UR STRIP-ACNC: <2 MG/DL
WBC #/AREA URNS AUTO: NORMAL /HPF

## 2023-07-22 PROCEDURE — 80048 BASIC METABOLIC PNL TOTAL CA: CPT

## 2023-07-22 PROCEDURE — 36415 COLL VENOUS BLD VENIPUNCTURE: CPT

## 2023-07-26 NOTE — PROGRESS NOTES
NEPHROLOGY OFFICE NOTE    Patient: Gerald Gandhi               Sex: male           YOB: 1990        Age:  26 y.o.       8/1/2023      BACKGROUND     I had the pleasure of seeing Gerald Gandhi in the nephrology office today for consultation. He has a past medical history significant for GERD with esophagitis, hypothyroidism, asthma, hypertension, migraine headaches. He was referred to our office by his PCP Dr. Gia Leblanc for further work-up and management of proteinuria. He has a history of hypertension, but currently not maintained on antihypertensive medication. Reports his blood pressures have been borderline and has not required initiation of medications to therapy yet. He has a history of eosinophilic esophagitis and GERD currently managed on pantoprazole 40 mg daily. Reports he has been holding the medication for the past 40 days as he is concerned it could be affecting his kidneys. After review of the medical record, it appears that baseline creatinine fluctuates from 0.7-0.8. During most recent urinalysis, the presence of microalbuminuria was noted with a microalbumin creatinine ratio of 64. No hematuria noted on urinalysis. Has a longstanding history of migraines documented use of NSAIDs as well as Imitrex. Most recent labs were obtained on 7/22/2023, which we have reviewed together. SUBJECTIVE     He currently has no complaints at this time and is feeling well. Patient denies any chest pain, shortness of breath, or swelling. REVIEW OF SYSTEMS     Review of Systems   Constitutional: Negative for activity change, chills, fatigue and fever. HENT: Negative for hearing loss, nosebleeds and trouble swallowing. Respiratory: Negative for cough and shortness of breath. Cardiovascular: Negative for chest pain and leg swelling. Gastrointestinal: Negative for constipation, diarrhea, nausea and vomiting.    Genitourinary: Negative for difficulty urinating, dysuria, frequency and hematuria. Musculoskeletal: Negative for back pain. Skin: Negative for pallor. Neurological: Negative for dizziness, syncope, weakness and light-headedness. Psychiatric/Behavioral: Negative for sleep disturbance. The patient is not nervous/anxious. OBJECTIVE     Current Weight: Weight - Scale: 85.2 kg (187 lb 12.8 oz)  Vitals:    08/01/23 1453   BP: 140/86   Pulse: 79   Temp: 98.2 °F (36.8 °C)   SpO2: 100%     Body mass index is 24.78 kg/m². CURRENT MEDICATIONS       Current Outpatient Medications:   •  pantoprazole (PROTONIX) 40 mg tablet, TAKE 1 TABLET BY MOUTH EVERY DAY (Patient not taking: Reported on 8/1/2023), Disp: 90 tablet, Rfl: 0  •  SUMAtriptan (IMITREX) 100 mg tablet, TAKE 1 TAB BY MOUTH ONCE AS NEEDED FOR MIGRAINE MAY REPEAT IN 2 HOURS IF NEEDED. MAX 2/24 HOURS, 3 DAYS PER WEEK, 9 TABS/MONTH (Patient not taking: Reported on 8/1/2023), Disp: 9 tablet, Rfl: 6    PHYSICAL EXAMINATION     Physical Exam  Vitals reviewed. Constitutional:       General: He is not in acute distress. HENT:      Head: Normocephalic. Mouth/Throat:      Lips: Pink. Mouth: Mucous membranes are moist.   Eyes:      General: Lids are normal. No scleral icterus. Cardiovascular:      Rate and Rhythm: Normal rate and regular rhythm. Heart sounds: S1 normal and S2 normal.   Pulmonary:      Effort: Pulmonary effort is normal. No accessory muscle usage or respiratory distress. Breath sounds: Normal breath sounds. Abdominal:      General: There is no distension. Tenderness: There is no abdominal tenderness. Musculoskeletal:      Cervical back: Normal range of motion and neck supple. No tenderness. Right lower leg: No edema. Left lower leg: No edema. Skin:     General: Skin is warm. Coloration: Skin is not cyanotic or jaundiced. Neurological:      General: No focal deficit present. Mental Status: He is alert and oriented to person, place, and time. Psychiatric:         Attention and Perception: Attention normal.         Speech: Speech normal.         Behavior: Behavior is cooperative. LAB RESULTS     BMP 7/22/2023:  Sodium 142  Potassium 4.1  Chloride 110  CO2 29  BUN 10  Creatinine 0.78  Calcium 9.2  EGFR 116      RADIOLOGY RESULTS      Ultrasound of the kidney and bladder 6/20/2023:  Right and left kidney with normal echogenicity and contour, no masses, no hydronephrosis, no calculi. ASSESSMENT/PLAN     Proteinuria:  Most recent microalbumin creatinine ratio slightly elevated at 64. Urinalysis had no evidence of hematuria. Current creatinine level is 0.78 with an EGFR of 116 and within suspected baseline. He had previously undergone serological work-up - HIV negative, SPEP and UPEP showed no M spike, hepatitis B and C nonreactive, cryoglobulin negative, UGO negative, C3 and C4 within normal limits, and anti-double-stranded DNA were all negative. Given normal renal function and absence of hematuria, suspicion for glomerular disease is low. Advised patient that use of NSAIDs can contribute to proteinuria. Cannot rule out the possibility of AIN in the setting of chronic pantoprazole use, however, patient is without any pyuria so suspicion is low. Currently diagnosed with prehypertension and not on antihypertensive therapy, unclear if having elevated blood pressure readings at home which could be contributing. Plan to repeat BMP as well obtain updated urinalysis and microalbumin creatinine ratio prior to follow-up. Advised against use of NSAIDs in the interim. Also advised him to avoid strenuous physical activity prior to obtaining follow-up labs. Hypertension:  Currently not on antihypertensive therapy and not monitoring home blood pressure readings. Asked him to start monitoring his home blood pressures and contact our office if consistently greater than 130/80. Recommend nephrology follow-up in 3-4 months.      [place] Shamar Crowley, [place]  [facility]  8/1/2023      Portions of the record may have been created with voice recognition software. Occasional wrong word or "sound a like" substitutions may have occurred due to the inherent limitations of voice recognition software. Read the chart carefully and recognize, using context, where substitutions have occurred.

## 2023-08-01 ENCOUNTER — CONSULT (OUTPATIENT)
Dept: NEPHROLOGY | Facility: CLINIC | Age: 33
End: 2023-08-01
Payer: COMMERCIAL

## 2023-08-01 VITALS
TEMPERATURE: 98.2 F | SYSTOLIC BLOOD PRESSURE: 140 MMHG | BODY MASS INDEX: 24.89 KG/M2 | HEIGHT: 73 IN | WEIGHT: 187.8 LBS | DIASTOLIC BLOOD PRESSURE: 86 MMHG | HEART RATE: 79 BPM | OXYGEN SATURATION: 100 %

## 2023-08-01 DIAGNOSIS — N06.9 ISOLATED PROTEINURIA WITH MORPHOLOGIC LESION: Primary | ICD-10-CM

## 2023-08-01 DIAGNOSIS — I10 BENIGN ESSENTIAL HYPERTENSION: ICD-10-CM

## 2023-08-01 PROCEDURE — 99244 OFF/OP CNSLTJ NEW/EST MOD 40: CPT

## 2023-08-03 ENCOUNTER — OFFICE VISIT (OUTPATIENT)
Dept: UROLOGY | Facility: CLINIC | Age: 33
End: 2023-08-03
Payer: COMMERCIAL

## 2023-08-03 ENCOUNTER — TELEPHONE (OUTPATIENT)
Dept: UROLOGY | Facility: CLINIC | Age: 33
End: 2023-08-03

## 2023-08-03 VITALS
DIASTOLIC BLOOD PRESSURE: 68 MMHG | SYSTOLIC BLOOD PRESSURE: 120 MMHG | HEART RATE: 71 BPM | HEIGHT: 73 IN | BODY MASS INDEX: 24.78 KG/M2 | OXYGEN SATURATION: 96 % | WEIGHT: 187 LBS

## 2023-08-03 DIAGNOSIS — Z12.5 PROSTATE CANCER SCREENING: ICD-10-CM

## 2023-08-03 DIAGNOSIS — R39.12 WEAK URINARY STREAM: Primary | ICD-10-CM

## 2023-08-03 LAB — POST-VOID RESIDUAL VOLUME, ML POC: 7 ML

## 2023-08-03 PROCEDURE — 99213 OFFICE O/P EST LOW 20 MIN: CPT | Performed by: PHYSICIAN ASSISTANT

## 2023-08-03 PROCEDURE — 51798 US URINE CAPACITY MEASURE: CPT | Performed by: PHYSICIAN ASSISTANT

## 2023-08-03 NOTE — PROGRESS NOTES
8/3/2023      Chief Complaint   Patient presents with   • LUTS     Assessment and Plan    1. Difficulty urinating x years  - Reported history of possible urethral dilation at age of 6   - PVR=7 mL  - Uroflow: 18 ml/s 288 mL  - ANTONIETA benign. Obtain PSA.   - Discussed trial of Flomax which he declines at this time.   -Discussed further work-up with cystoscopy which she is interested in pursuing. Will schedule. History of Present Illness  Paula Gardner is a 26 y.o. male here for new patient evaluation of lower urinary tract symptoms. For the past several years he reports weak and slow urinary stream.  He denies any pain with urination or spraying of urinary stream.  Denies any flank pain or hematuria. He does note a history of narrowing to the urethra when he was younger requiring what sounds like a dilation procedure. Recent US kidney bladder from 6/20/23 unremarkable. Post void volume was 7.4 mL. Recent UA micro from 7/22/23 was negative for infection or blood. Review of Systems   Constitutional: Negative for chills and fever. Respiratory: Negative for shortness of breath. Cardiovascular: Negative for chest pain. Gastrointestinal: Negative for abdominal pain. Genitourinary: Positive for difficulty urinating. Negative for dysuria, flank pain, frequency, hematuria and urgency. Neurological: Negative for dizziness.            AUA SYMPTOM SCORE    Flowsheet Row Most Recent Value   AUA SYMPTOM SCORE    How often have you had a sensation of not emptying your bladder completely after you finished urinating? 1 (P)     How often have you had to urinate again less than two hours after you finished urinating? 2 (P)     How often have you found you stopped and started again several times when you urinate? 2 (P)     How often have you found it difficult to postpone urination? 0 (P)     How often have you had a weak urinary stream? 2 (P)     How often have you had to push or strain to begin urination? 0 (P)     How many times did you most typically get up to urinate from the time you went to bed at night until the time you got up in the morning? 0 (P)     Quality of Life: If you were to spend the rest of your life with your urinary condition just the way it is now, how would you feel about that? 3 (P)     AUA SYMPTOM SCORE 7 (P)              Past Medical History  Past Medical History:   Diagnosis Date   • Asthma     5/12/21 Pt states has not had any recent issues with asthma   • Bleeding per rectum 99/39/4689   • Eosinophilic esophagitis    • GERD (gastroesophageal reflux disease)    • History of migraine headaches     5/12/21 Pt states with migraine headaches approx 2x/week. Pt has family hx and also with seasonal weather changes. • Left shoulder pain 04/02/2020   • Lower urinary tract symptoms (LUTS)    • Overweight 05/21/2020       Past Social History  Past Surgical History:   Procedure Laterality Date   • CYSTOSCOPY      for urethral stricture   • ESOPHAGOGASTRODUODENOSCOPY     • IA ESOPHAGOGASTRODUODENOSCOPY TRANSORAL DIAGNOSTIC N/A 3/11/2019    Procedure: ESOPHAGOGASTRODUODENOSCOPY (EGD); Surgeon: Milli Duke DO;  Location: [facility];   Service: Gastroenterology   • IA VASECTOMY UNI/BI SPX W/POSTOP SEMEN EXAMS Bilateral 5/14/2021    Procedure: Berto Veras;  Surgeon: Karuna Lombardi MD;  Location: [facility] MAIN OR;  Service: Urology   • TONSILLECTOMY       Social History     Tobacco Use   Smoking Status Never   • Passive exposure: Never   Smokeless Tobacco Never   Tobacco Comments    non-smoker       Past Family History  Family History   Problem Relation Age of Onset   • Migraines Mother    • Hypertension Father    • No Known Problems Sister    • Hip dysplasia Brother    • No Known Problems Maternal Grandmother    • No Known Problems Maternal Grandfather    • No Known Problems Paternal Grandmother    • No Known Problems Paternal Grandfather    • No Known Problems Maternal Aunt    • No Known Problems Maternal Uncle    • No Known Problems Paternal Aunt    • No Known Problems Paternal Uncle    • No Known Problems Cousin    • Heart disease Family    • Migraines Family        Past Social history  Social History     Socioeconomic History   • Marital status: /Civil Union     Spouse name: Not on file   • Number of children: Not on file   • Years of education: Not on file   • Highest education level: Not on file   Occupational History   • Not on file   Tobacco Use   • Smoking status: Never     Passive exposure: Never   • Smokeless tobacco: Never   • Tobacco comments:     non-smoker   Vaping Use   • Vaping Use: Never used   Substance and Sexual Activity   • Alcohol use: No     Comment: Denies   • Drug use: No     Comment: Denies   • Sexual activity: Yes     Partners: Female     Birth control/protection: Condom Male, Surgical   Other Topics Concern   • Not on file   Social History Narrative   • Not on file     Social Determinants of Health     Financial Resource Strain: Not on file   Food Insecurity: Not on file   Transportation Needs: Not on file   Physical Activity: Inactive (5/20/2020)    Exercise Vital Sign    • Days of Exercise per Week: 0 days    • Minutes of Exercise per Session: 0 min   Stress: Not on file   Social Connections: Not on file   Intimate Partner Violence: Not on file   Housing Stability: Not on file       Current Medications  Current Outpatient Medications   Medication Sig Dispense Refill   • pantoprazole (PROTONIX) 40 mg tablet TAKE 1 TABLET BY MOUTH EVERY DAY (Patient not taking: Reported on 8/1/2023) 90 tablet 0   • SUMAtriptan (IMITREX) 100 mg tablet TAKE 1 TAB BY MOUTH ONCE AS NEEDED FOR MIGRAINE MAY REPEAT IN 2 HOURS IF NEEDED. MAX 2/24 HOURS, 3 DAYS PER WEEK, 9 TABS/MONTH (Patient not taking: Reported on 8/1/2023) 9 tablet 6     No current facility-administered medications for this visit.        Allergies  Allergies   Allergen Reactions   • Beef-Derived Products - Food Allergy Throat Swelling     EOE   • Milk (Cow) Other (See Comments)     Milk allergy -eosinophilic esophagitis         The following portions of the patient's history were reviewed and updated as appropriate: allergies, current medications, past medical history, past social history, past surgical history and problem list.      Vitals  Vitals:    08/03/23 1522   BP: 120/68   Pulse: 71   SpO2: 96%   Weight: 84.8 kg (187 lb)   Height: 6' 1" (1.854 m)           Physical Exam  Physical Exam  Constitutional:       Appearance: Normal appearance. HENT:      Head: Normocephalic and atraumatic. Right Ear: External ear normal.      Left Ear: External ear normal.      Nose: Nose normal.   Eyes:      General: No scleral icterus. Conjunctiva/sclera: Conjunctivae normal.   Cardiovascular:      Pulses: Normal pulses. Pulmonary:      Effort: Pulmonary effort is normal.   Genitourinary:     Comments: Prostate approximately 25 to 30 g without nodules or tenderness. Musculoskeletal:         General: Normal range of motion. Cervical back: Normal range of motion. Skin:     General: Skin is warm and dry. Neurological:      General: No focal deficit present. Mental Status: He is alert and oriented to person, place, and time. Psychiatric:         Mood and Affect: Mood normal.         Behavior: Behavior normal.         Thought Content:  Thought content normal.         Judgment: Judgment normal.           Results  Recent Results (from the past 1 hour(s))   POCT Measure PVR    Collection Time: 08/03/23  3:32 PM   Result Value Ref Range    POST-VOID RESIDUAL VOLUME, ML POC 7 mL   ]  No results found for: "PSA"  Lab Results   Component Value Date    CALCIUM 9.2 07/22/2023     05/05/2017    K 4.1 07/22/2023    CO2 29 07/22/2023     (H) 07/22/2023    BUN 10 07/22/2023    CREATININE 0.78 07/22/2023     Lab Results   Component Value Date    WBC 5.21 06/20/2023    HGB 15.0 06/20/2023    HCT 45.5 06/20/2023    MCV 89 06/20/2023     06/20/2023           Orders  Orders Placed This Encounter   Procedures   • POCT Measure PVR     Chris Aguirre

## 2023-08-03 NOTE — TELEPHONE ENCOUNTER
Left detailed message for patient requesting he NOT urinate from now til his appointment as we would like him to use the uroflow.

## 2023-10-26 ENCOUNTER — TELEPHONE (OUTPATIENT)
Dept: NEPHROLOGY | Facility: CLINIC | Age: 33
End: 2023-10-26

## 2023-10-26 NOTE — TELEPHONE ENCOUNTER
I called [employer] (Automated System) @ [phone] to check eligible for the patient and as of 10/26/2023 the patient has current active coverage as of 1/1/2023.  Ángela Castaneda, .

## 2023-11-02 ENCOUNTER — TELEPHONE (OUTPATIENT)
Dept: NEPHROLOGY | Facility: CLINIC | Age: 33
End: 2023-11-02

## 2023-11-06 ENCOUNTER — APPOINTMENT (OUTPATIENT)
Age: 33
End: 2023-11-06
Payer: COMMERCIAL

## 2023-11-06 DIAGNOSIS — I10 BENIGN ESSENTIAL HYPERTENSION: ICD-10-CM

## 2023-11-06 DIAGNOSIS — N06.9 ISOLATED PROTEINURIA WITH MORPHOLOGIC LESION: ICD-10-CM

## 2023-11-06 DIAGNOSIS — Z12.5 PROSTATE CANCER SCREENING: ICD-10-CM

## 2023-11-06 LAB
ANION GAP SERPL CALCULATED.3IONS-SCNC: 9 MMOL/L
BACTERIA UR QL AUTO: ABNORMAL /HPF
BILIRUB UR QL STRIP: NEGATIVE
BUN SERPL-MCNC: 7 MG/DL (ref 5–25)
CALCIUM SERPL-MCNC: 9.2 MG/DL (ref 8.4–10.2)
CHLORIDE SERPL-SCNC: 105 MMOL/L (ref 96–108)
CLARITY UR: CLEAR
CO2 SERPL-SCNC: 27 MMOL/L (ref 21–32)
COLOR UR: ABNORMAL
CREAT SERPL-MCNC: 0.65 MG/DL (ref 0.6–1.3)
CREAT UR-MCNC: 113.5 MG/DL
GFR SERPL CREATININE-BSD FRML MDRD: 126 ML/MIN/1.73SQ M
GLUCOSE P FAST SERPL-MCNC: 89 MG/DL (ref 65–99)
GLUCOSE UR STRIP-MCNC: NEGATIVE MG/DL
HGB UR QL STRIP.AUTO: NEGATIVE
KETONES UR STRIP-MCNC: NEGATIVE MG/DL
LEUKOCYTE ESTERASE UR QL STRIP: NEGATIVE
MICROALBUMIN UR-MCNC: 77.5 MG/L
MICROALBUMIN/CREAT 24H UR: 68 MG/G CREATININE (ref 0–30)
MUCOUS THREADS UR QL AUTO: ABNORMAL
NITRITE UR QL STRIP: NEGATIVE
NON-SQ EPI CELLS URNS QL MICRO: ABNORMAL /HPF
PH UR STRIP.AUTO: 6 [PH]
POTASSIUM SERPL-SCNC: 4 MMOL/L (ref 3.5–5.3)
PROT UR STRIP-MCNC: ABNORMAL MG/DL
RBC #/AREA URNS AUTO: ABNORMAL /HPF
SODIUM SERPL-SCNC: 141 MMOL/L (ref 135–147)
SP GR UR STRIP.AUTO: 1.01 (ref 1–1.03)
UROBILINOGEN UR STRIP-ACNC: <2 MG/DL
WBC #/AREA URNS AUTO: ABNORMAL /HPF

## 2023-11-06 PROCEDURE — G0103 PSA SCREENING: HCPCS

## 2023-11-06 PROCEDURE — 81001 URINALYSIS AUTO W/SCOPE: CPT

## 2023-11-06 PROCEDURE — 36415 COLL VENOUS BLD VENIPUNCTURE: CPT

## 2023-11-06 PROCEDURE — 82570 ASSAY OF URINE CREATININE: CPT

## 2023-11-06 PROCEDURE — 82043 UR ALBUMIN QUANTITATIVE: CPT

## 2023-11-06 PROCEDURE — 80048 BASIC METABOLIC PNL TOTAL CA: CPT

## 2023-11-07 LAB — PSA SERPL-MCNC: 0.57 NG/ML (ref 0–4)

## 2023-11-08 PROBLEM — R80.1 PERSISTENT PROTEINURIA: Status: ACTIVE | Noted: 2023-06-16

## 2023-11-08 NOTE — PROGRESS NOTES
NEPHROLOGY OFFICE NOTE    Patient: Jos Nuñez               Sex: male           YOB: 1990        Age:  26 y.o.       11/9/2023      BACKGROUND     I had the pleasure of seeing Jos Nuñez in the nephrology office today for follow-up. He has a past medical history significant for GERD with esophagitis, hypothyroidism, asthma, hypertension, and migraine headaches. He is following with our office for proteinuria. He has underlying hypertension, currently not on antihypertensive therapy. He has a history of eosinophilic esophagitis and GERD, previously taking pantoprazole 40 mg daily. Reports that he has been off of Protonix and doing well. After review of the medical record, it appears baseline creatinine levels fluctuate around 0.7 - 0.8. He was noted to have the presence of proteinuria on dipstick with a microalbumin to creatinine ratio of 64 when he was referred. He has a longstanding history of migraines with documented use of NSAID and Imitrex. Reports that he is periodically using Imitrex but not taking additional NSAIDs at present. Most recent labs were obtained on 11/6/2023, which we have reviewed together. SUBJECTIVE     He currently has no complaints at this time and is feeling well. Patient denies any chest pain, shortness of breath, or swelling. REVIEW OF SYSTEMS     Review of Systems   Constitutional:  Negative for activity change, chills, fatigue and fever. HENT:  Negative for trouble swallowing. Respiratory:  Negative for shortness of breath. Cardiovascular:  Negative for leg swelling. Gastrointestinal:  Negative for constipation, diarrhea, nausea and vomiting. Genitourinary:  Negative for difficulty urinating, dysuria, frequency and hematuria. Musculoskeletal:  Negative for back pain. Skin:  Negative for pallor. Neurological:  Negative for dizziness, syncope, weakness and light-headedness.    Psychiatric/Behavioral:  Negative for sleep disturbance. The patient is not nervous/anxious. OBJECTIVE     Current Weight: Weight - Scale: 85.3 kg (188 lb)  Vitals:    11/09/23 1021   BP: 124/84   Pulse: 94   Resp: 16   Temp: (!) 97 °F (36.1 °C)   SpO2: 99%     Body mass index is 24.8 kg/m². CURRENT MEDICATIONS       Current Outpatient Medications:     losartan (COZAAR) 25 mg tablet, Take 0.5 tablets (12.5 mg total) by mouth daily, Disp: 45 tablet, Rfl: 2    PHYSICAL EXAMINATION     Physical Exam  Vitals reviewed. Constitutional:       General: He is not in acute distress. HENT:      Head: Normocephalic. Mouth/Throat:      Lips: Pink. Mouth: Mucous membranes are moist.   Eyes:      General: Lids are normal. No scleral icterus. Cardiovascular:      Rate and Rhythm: Normal rate and regular rhythm. Heart sounds: S1 normal and S2 normal.   Pulmonary:      Effort: Pulmonary effort is normal. No accessory muscle usage or respiratory distress. Breath sounds: Normal breath sounds. Abdominal:      General: There is no distension. Tenderness: There is no abdominal tenderness. Musculoskeletal:      Cervical back: Normal range of motion and neck supple. No tenderness. Right lower leg: No edema. Left lower leg: No edema. Skin:     General: Skin is warm. Coloration: Skin is not cyanotic or jaundiced. Neurological:      General: No focal deficit present. Mental Status: He is alert and oriented to person, place, and time. Psychiatric:         Attention and Perception: Attention normal.         Speech: Speech normal.         Behavior: Behavior is cooperative.            LAB RESULTS     Results from last 7 days   Lab Units 11/06/23  0846   SODIUM mmol/L 141   POTASSIUM mmol/L 4.0   CHLORIDE mmol/L 105   CO2 mmol/L 27   BUN mg/dL 7   CREATININE mg/dL 0.65   EGFR ml/min/1.73sq m 126   CALCIUM mg/dL 9.2         RADIOLOGY RESULTS      Ultrasound of the kidney and bladder 6/20/2023:  Right and left kidney with normal echogenicity and contour, no masses, no hydronephrosis, no calculi. ASSESSMENT/PLAN     Proteinuria:  Most recent urinalysis showed the presence of trace proteinuria, microalbumin to creatinine ratio remains elevated at 68. No evidence of hematuria or pyuria on analysis. Most recent creatinine level 0.65 with an eGFR of 126 and preserved with no evidence of renal insufficiency. He had previously undergone serological work-up 6/2023 - HIV negative, SPEP and UPEP showed no M spike, hepatitis B and C nonreactive, cryoglobulin negative, UGO negative, C3 and C4 within normal limits, and anti-double-stranded DNA were all negative. Given normal renal function, absence of hematuria, and negative serological work-up, suspicion for underlying glomerular disease is low. Etiology may be multifactorial in the setting of hypertensive nephrosclerosis and prior use of NSAIDs for pain management. Given reported history of reactive airway disease, will start losartan 12.5 mg once daily for renal protection and follow-up on repeat BMP in 1 to 2 weeks. Hypertension:  Not currently on antihypertensive therapy. Recommend initiation of losartan as above. Nephrology follow up with BMP and urine studies in 6 months. [facility], [address] [place] [address]  Nephrology  11/9/2023      Portions of the record may have been created with voice recognition software. Occasional wrong word or "sound a like" substitutions may have occurred due to the inherent limitations of voice recognition software. Read the chart carefully and recognize, using context, where substitutions have occurred.

## 2023-11-09 ENCOUNTER — OFFICE VISIT (OUTPATIENT)
Dept: NEPHROLOGY | Facility: CLINIC | Age: 33
End: 2023-11-09

## 2023-11-09 VITALS
RESPIRATION RATE: 16 BRPM | WEIGHT: 188 LBS | OXYGEN SATURATION: 99 % | HEART RATE: 94 BPM | TEMPERATURE: 97 F | DIASTOLIC BLOOD PRESSURE: 84 MMHG | HEIGHT: 73 IN | BODY MASS INDEX: 24.92 KG/M2 | SYSTOLIC BLOOD PRESSURE: 124 MMHG

## 2023-11-09 DIAGNOSIS — I10 BENIGN ESSENTIAL HYPERTENSION: ICD-10-CM

## 2023-11-09 DIAGNOSIS — R80.1 PERSISTENT PROTEINURIA: Primary | ICD-10-CM

## 2023-11-09 RX ORDER — LOSARTAN POTASSIUM 25 MG/1
12.5 TABLET ORAL DAILY
Qty: 45 TABLET | Refills: 2 | Status: SHIPPED | OUTPATIENT
Start: 2023-11-09

## 2023-11-09 NOTE — LETTER
November 9, 2023     Jaylene Wallace MD  [address] [address]    Patient: Kayla Saleem   YOB: 1990   Date of Visit: 11/9/2023       Dear Dr. Frederick Morris:    Thank you for referring Kayla Saleem to me for evaluation. Below are my notes for this consultation. If you have questions, please do not hesitate to call me. I look forward to following your patient along with you. Sincerely,        MAY Cordero        CC: No Recipients    Mango Rodriguez, [address] [place] [address]  11/9/2023 10:52 AM  Incomplete  NEPHROLOGY OFFICE NOTE    Patient: Kayla Saleem               Sex: male           YOB: 1990        Age:  26 y.o.       11/9/2023      BACKGROUND     I had the pleasure of seeing Kayla Saleem in the nephrology office today for follow-up. He has a past medical history significant for GERD with esophagitis, hypothyroidism, asthma, hypertension, and migraine headaches. He is following with our office for proteinuria. He has underlying hypertension, currently not on antihypertensive therapy. He has a history of eosinophilic esophagitis and GERD, previously taking pantoprazole 40 mg daily. Reports that he has been off of Protonix and doing well. After review of the medical record, it appears baseline creatinine levels fluctuate around 0.7 - 0.8. He was noted to have the presence of proteinuria on dipstick with a microalbumin to creatinine ratio of 64 when he was referred. He has a longstanding history of migraines with documented use of NSAID and Imitrex. Reports that he is periodically using Imitrex but not taking additional NSAIDs at present. Most recent labs were obtained on 11/6/2023, which we have reviewed together. SUBJECTIVE     He currently has no complaints at this time and is feeling well. Patient denies any chest pain, shortness of breath, or swelling.     REVIEW OF SYSTEMS     Review of Systems   Constitutional: Negative for activity change, chills, fatigue and fever. HENT:  Negative for trouble swallowing. Respiratory:  Negative for shortness of breath. Cardiovascular:  Negative for leg swelling. Gastrointestinal:  Negative for constipation, diarrhea, nausea and vomiting. Genitourinary:  Negative for difficulty urinating, dysuria, frequency and hematuria. Musculoskeletal:  Negative for back pain. Skin:  Negative for pallor. Neurological:  Negative for dizziness, syncope, weakness and light-headedness. Psychiatric/Behavioral:  Negative for sleep disturbance. The patient is not nervous/anxious. OBJECTIVE     Current Weight: Weight - Scale: 85.3 kg (188 lb)  Vitals:    11/09/23 1021   BP: 124/84   Pulse: 94   Resp: 16   Temp: (!) 97 °F (36.1 °C)   SpO2: 99%     Body mass index is 24.8 kg/m². CURRENT MEDICATIONS       Current Outpatient Medications:   •  losartan (COZAAR) 25 mg tablet, Take 0.5 tablets (12.5 mg total) by mouth daily, Disp: 45 tablet, Rfl: 2    PHYSICAL EXAMINATION     Physical Exam  Vitals reviewed. Constitutional:       General: He is not in acute distress. HENT:      Head: Normocephalic. Mouth/Throat:      Lips: Pink. Mouth: Mucous membranes are moist.   Eyes:      General: Lids are normal. No scleral icterus. Cardiovascular:      Rate and Rhythm: Normal rate and regular rhythm. Heart sounds: S1 normal and S2 normal.   Pulmonary:      Effort: Pulmonary effort is normal. No accessory muscle usage or respiratory distress. Breath sounds: Normal breath sounds. Abdominal:      General: There is no distension. Tenderness: There is no abdominal tenderness. Musculoskeletal:      Cervical back: Normal range of motion and neck supple. No tenderness. Right lower leg: No edema. Left lower leg: No edema. Skin:     General: Skin is warm. Coloration: Skin is not cyanotic or jaundiced. Neurological:      General: No focal deficit present. Mental Status: He is alert and oriented to person, place, and time. Psychiatric:         Attention and Perception: Attention normal.         Speech: Speech normal.         Behavior: Behavior is cooperative. LAB RESULTS     Results from last 7 days   Lab Units 11/06/23  0846   SODIUM mmol/L 141   POTASSIUM mmol/L 4.0   CHLORIDE mmol/L 105   CO2 mmol/L 27   BUN mg/dL 7   CREATININE mg/dL 0.65   EGFR ml/min/1.73sq m 126   CALCIUM mg/dL 9.2         RADIOLOGY RESULTS      Ultrasound of the kidney and bladder 6/20/2023:  Right and left kidney with normal echogenicity and contour, no masses, no hydronephrosis, no calculi. ASSESSMENT/PLAN     Proteinuria:  Most recent urinalysis showed the presence of trace proteinuria, microalbumin to creatinine ratio remains elevated at 68. No evidence of hematuria or pyuria on analysis. Most recent creatinine level 0.65 with an eGFR of 126 and preserved with no evidence of renal insufficiency. He had previously undergone serological work-up 6/2023 - HIV negative, SPEP and UPEP showed no M spike, hepatitis B and C nonreactive, cryoglobulin negative, UGO negative, C3 and C4 within normal limits, and anti-double-stranded DNA were all negative. Given normal renal function, absence of hematuria, and negative serological work-up, suspicion for underlying glomerular disease is low. Etiology may be multifactorial in the setting of hypertensive nephrosclerosis and prior use of NSAIDs for pain management. Given reported history of reactive airway disease, will start losartan 12.5 mg once daily for renal protection and follow-up on repeat BMP in 1 to 2 weeks. Hypertension:  Not currently on antihypertensive therapy. Recommend initiation of losartan as above. Nephrology follow up with BMP and urine studies in 6 months. [place] [place], [address] [place] [address]  Nephrology  11/9/2023      Portions of the record may have been created with voice recognition software. Occasional wrong word or "sound a like" substitutions may have occurred due to the inherent limitations of voice recognition software. Read the chart carefully and recognize, using context, where substitutions have occurred. [address] [place] [address]  11/9/2023  8:49 AM  Sign when Signing Visit  Ultrasound of the kidney and bladder 6/20/2023:  Right and left kidney with normal echogenicity and contour, no masses, no hydronephrosis, no calculi. Violamelba Babcock in the nephrology office today for follow-up. He has a past medical history significant for GERD with esophagitis, hypothyroidism, asthma, hypertension, and migraine headaches. He is following with our office for proteinuria. He has underlying hypertension, currently not on antihypertensive therapy. He has a history of eosinophilic esophagitis and GERD, previously taking pantoprazole 40 mg dialy. After review of the medical record, it appears baseline creatinine levels fluctuate around 0.7 - 0.8. He was noted to have the presence of proteinuria on dipstick with a microalbumin to creatinine ratio of 64 when he was referred. He has a longstanding history of migraines with documented use of NSAID and Imitrex. Most recent labs were obtained on 11/6/2023, which we have reviewed together. Proteinuria:  Most recent urinalysis showed the presence of trace proteinuria, microalbumin to creatinine ratio remains elevated at 68. No evidence of hematuria or pyuria on analysis. Most recent creatinine level 0.65 with an eGFR of 126 and preserved with no evidence of renal insufficiency. He had previously undergone serological work-up 6/2023 - HIV negative, SPEP and UPEP showed no M spike, hepatitis B and C nonreactive, cryoglobulin negative, UGO negative, C3 and C4 within normal limits, and anti-double-stranded DNA were all negative.      Given normal renal function, absence of hematuria, and negative serological work-up, suspicion for underlying glomerular disease is low. Etiology may be multifactorial in the setting of hypertensive nephrosclerosis and prior use of NSAIDs for pain management. Recommend initiation of lisinopril 2.5 mg once daily for management of blood pressure and renal protection with evidence of proteinuria. Hypertension:  Not currently on antihypertensive therapy. Recommend initiation of lisinopril as above.

## 2023-11-21 ENCOUNTER — APPOINTMENT (OUTPATIENT)
Age: 33
End: 2023-11-21
Payer: COMMERCIAL

## 2023-11-21 DIAGNOSIS — I10 BENIGN ESSENTIAL HYPERTENSION: ICD-10-CM

## 2023-11-21 LAB
ANION GAP SERPL CALCULATED.3IONS-SCNC: 7 MMOL/L
BUN SERPL-MCNC: 8 MG/DL (ref 5–25)
CALCIUM SERPL-MCNC: 10.3 MG/DL (ref 8.4–10.2)
CHLORIDE SERPL-SCNC: 104 MMOL/L (ref 96–108)
CO2 SERPL-SCNC: 31 MMOL/L (ref 21–32)
CREAT SERPL-MCNC: 0.7 MG/DL (ref 0.6–1.3)
GFR SERPL CREATININE-BSD FRML MDRD: 122 ML/MIN/1.73SQ M
GLUCOSE P FAST SERPL-MCNC: 84 MG/DL (ref 65–99)
POTASSIUM SERPL-SCNC: 4.1 MMOL/L (ref 3.5–5.3)
SODIUM SERPL-SCNC: 142 MMOL/L (ref 135–147)

## 2023-11-21 PROCEDURE — 36415 COLL VENOUS BLD VENIPUNCTURE: CPT

## 2023-11-21 PROCEDURE — 80048 BASIC METABOLIC PNL TOTAL CA: CPT

## 2023-11-22 ENCOUNTER — TELEPHONE (OUTPATIENT)
Dept: NEPHROLOGY | Facility: CLINIC | Age: 33
End: 2023-11-22

## 2023-11-22 NOTE — TELEPHONE ENCOUNTER
Called and LM stating that his renal function and potassium level are within baseline following initiation of losartan. We can continue the medication at current dose to manage the protein in his urine, per [place]. Asked pt to call us back if any questions or concerns.

## 2023-11-22 NOTE — TELEPHONE ENCOUNTER
----- Message from Sravan Goodman, [facility] sent at 11/22/2023  1:19 PM EST -----  Good afternoon,  Can you please call this patient and let him know that his renal function and potassium level are within baseline following initiation of losartan. We can continue the medication at current dose to manage the protein in his urine.     Thank you,  [place]

## 2024-01-22 ENCOUNTER — PROCEDURE VISIT (OUTPATIENT)
Dept: UROLOGY | Facility: CLINIC | Age: 34
End: 2024-01-22
Payer: COMMERCIAL

## 2024-01-22 VITALS
WEIGHT: 191 LBS | DIASTOLIC BLOOD PRESSURE: 80 MMHG | HEART RATE: 78 BPM | BODY MASS INDEX: 25.31 KG/M2 | HEIGHT: 73 IN | SYSTOLIC BLOOD PRESSURE: 120 MMHG | OXYGEN SATURATION: 99 %

## 2024-01-22 DIAGNOSIS — N40.1 BENIGN LOCALIZED PROSTATIC HYPERPLASIA WITH LOWER URINARY TRACT SYMPTOMS (LUTS): Primary | ICD-10-CM

## 2024-01-22 PROCEDURE — 52000 CYSTOURETHROSCOPY: CPT | Performed by: UROLOGY

## 2024-01-22 NOTE — PROGRESS NOTES
"33-year-old male with voiding issues    No anticoagulation    Reported history of urethral dilation around age 8    RBUS 6/20/2023: Unremarkable    UA 7/22/2023: Negative    Seen in urology office August 2023.  At that time he admitted to weak urinary stream, hesitancy.    PVR 7 cc on 8/3/2023    Uroflow 18 mL/s, 288 cc on 8/3/2023    PSA 0.57 on 11/6/23             Cystoscopy     Date/Time  1/22/2024 11:00 AM     Performed by  Rick Sands DO   Authorized by  Rick Sands DO     Universal Protocol:  Consent: Verbal consent obtained. Written consent obtained.  Risks and benefits: risks, benefits and alternatives were discussed  Consent given by: patient  Time out: Immediately prior to procedure a \"time out\" was called to verify the correct patient, procedure, equipment, support staff and site/side marked as required.  Timeout called at: 1/22/2024 11:50 AM.  Patient understanding: patient states understanding of the procedure being performed  Patient consent: the patient's understanding of the procedure matches consent given  Procedure consent: procedure consent matches procedure scheduled  Relevant documents: relevant documents present and verified  Required items: required blood products, implants, devices, and special equipment available  Patient identity confirmed: verbally with patient      Procedure Details:  Procedure type: cystoscopy    Patient tolerance: Patient tolerated the procedure well with no immediate complications    Additional Procedure Details: Office Cystoscopy Procedure Note    Indication:    LUTS    Informed consent   The risks, benefits, complications, treatment options, and expected outcomes were discussed with the patient. The patient concurred with the proposed plan and provided informed consent.    Anesthesia  Lidocaine jelly 2%    Antibiotic prophylaxis   None    Procedure  The patient was placed in the supineposition, was prepped and draped in the usual manner using " sterile technique, and 2% lidocaine jelly instilled into the urethra.  A 17 F flexible cystoscope was then inserted into the urethra and the urethra and bladder carefully examined.  The following findings were noted:    Findings:  Urethra:  Normal, no strictures  Prostate:  no lateral lobe hypertrophy, minimal median lobe, no lesions, open channel  Bladder:  No trabeculations, no papillary lesions, no stones  Ureteral orifices:  orthotopic  Other findings:  None, retroflexed view confirms    Specimens: None                 Complications:    None; patient tolerated the procedure well           Disposition: To home            Condition: Stable    Plan:   -F/u PRN        Reviewed findings with patient.  Explained that he did not have any urethral strictures present.  Explained that his prostate was not enlarged.  Appeared to have a normal bladder.    I explained that his voiding symptoms of apparent weak urinary stream (he states this is worse when he is sitting to urinate) and occasional hesitancy were relatively mild on the scale of voiding symptoms.  I explained that his previous testing in August revealed that he empties his bladder well and that he had a decent stream with a uroflow of 18 cc/s.    I explained that generally speaking voiding medications can often have sexual side effects, and given the fact that his symptoms are overall mild, he was not having infections, he was not having blood in his urine, there did not appear to be any strong indication for treatment.  He agreed with this.    At this point, he will monitor his symptoms and will call us if his symptoms worsen.  Otherwise he can follow-up as needed.    All questions were answered.  He was appreciative of our conversation.

## 2024-01-22 NOTE — LETTER
"January 22, 2024     Alma Baker MD  [address]  2nd Floor  [facility] [address]    Patient: Tobi Oliveira   YOB: 1990   Date of Visit: 1/22/2024       Dear Dr. Baker:    Thank you for referring Tobi Oliveira to me for evaluation. Below are my notes for this consultation.    If you have questions, please do not hesitate to call me. I look forward to following your patient along with you.         Sincerely,        Rick Sands DO        CC: No Recipients    Rick Sands DO  1/22/2024 11:54 AM  Sign when Signing Visit  33-year-old male with voiding issues    No anticoagulation    Reported history of urethral dilation around age 8    RBUS 6/20/2023: Unremarkable    UA 7/22/2023: Negative    Seen in urology office August 2023.  At that time he admitted to weak urinary stream, hesitancy.    PVR 7 cc on 8/3/2023    Uroflow 18 mL/s, 288 cc on 8/3/2023    PSA 0.57 on 11/6/23             Cystoscopy     Date/Time  1/22/2024 11:00 AM     Performed by  Rick Sands DO   Authorized by  Rick Sands DO     Universal Protocol:  Consent: Verbal consent obtained. Written consent obtained.  Risks and benefits: risks, benefits and alternatives were discussed  Consent given by: patient  Time out: Immediately prior to procedure a \"time out\" was called to verify the correct patient, procedure, equipment, support staff and site/side marked as required.  Timeout called at: 1/22/2024 11:50 AM.  Patient understanding: patient states understanding of the procedure being performed  Patient consent: the patient's understanding of the procedure matches consent given  Procedure consent: procedure consent matches procedure scheduled  Relevant documents: relevant documents present and verified  Required items: required blood products, implants, devices, and special equipment available  Patient identity confirmed: verbally with patient      Procedure Details:  Procedure type: " cystoscopy    Patient tolerance: Patient tolerated the procedure well with no immediate complications    Additional Procedure Details: Office Cystoscopy Procedure Note    Indication:    LUTS    Informed consent   The risks, benefits, complications, treatment options, and expected outcomes were discussed with the patient. The patient concurred with the proposed plan and provided informed consent.    Anesthesia  Lidocaine jelly 2%    Antibiotic prophylaxis   None    Procedure  The patient was placed in the supineposition, was prepped and draped in the usual manner using sterile technique, and 2% lidocaine jelly instilled into the urethra.  A 17 F flexible cystoscope was then inserted into the urethra and the urethra and bladder carefully examined.  The following findings were noted:    Findings:  Urethra:  Normal, no strictures  Prostate:  no lateral lobe hypertrophy, minimal median lobe, no lesions, open channel  Bladder:  No trabeculations, no papillary lesions, no stones  Ureteral orifices:  orthotopic  Other findings:  None, retroflexed view confirms    Specimens: None                 Complications:    None; patient tolerated the procedure well           Disposition: To home            Condition: Stable    Plan:   -F/u PRN        Reviewed findings with patient.  Explained that he did not have any urethral strictures present.  Explained that his prostate was not enlarged.  Appeared to have a normal bladder.    I explained that his voiding symptoms of apparent weak urinary stream (he states this is worse when he is sitting to urinate) and occasional hesitancy were relatively mild on the scale of voiding symptoms.  I explained that his previous testing in August revealed that he empties his bladder well and that he had a decent stream with a uroflow of 18 cc/s.    I explained that generally speaking voiding medications can often have sexual side effects, and given the fact that his symptoms are overall mild, he was  not having infections, he was not having blood in his urine, there did not appear to be any strong indication for treatment.  He agreed with this.    At this point, he will monitor his symptoms and will call us if his symptoms worsen.  Otherwise he can follow-up as needed.    All questions were answered.  He was appreciative of our conversation.

## 2024-01-22 NOTE — PATIENT INSTRUCTIONS
You had cystoscopy done in the office today. This means that we looked inside your urethra and bladder with a camera.    You may see some blood in your urine for the next few days. This is normal. Please drink plenty of fluids. Call the office if you are passing large blood clots in your urine or if you are not able to urinate.    It may burn when you urinate for the next few days. This is normal.    Please call the office if you have fevers or chills in the next few days.    You will return to clinic as needed.

## 2024-03-26 ENCOUNTER — OFFICE VISIT (OUTPATIENT)
Age: 34
End: 2024-03-26
Payer: COMMERCIAL

## 2024-03-26 VITALS
BODY MASS INDEX: 25.38 KG/M2 | OXYGEN SATURATION: 99 % | HEART RATE: 73 BPM | WEIGHT: 192.4 LBS | DIASTOLIC BLOOD PRESSURE: 73 MMHG | SYSTOLIC BLOOD PRESSURE: 137 MMHG | TEMPERATURE: 97.9 F | RESPIRATION RATE: 18 BRPM

## 2024-03-26 DIAGNOSIS — J01.40 ACUTE NON-RECURRENT PANSINUSITIS: Primary | ICD-10-CM

## 2024-03-26 PROCEDURE — 99213 OFFICE O/P EST LOW 20 MIN: CPT | Performed by: EMERGENCY MEDICINE

## 2024-03-26 RX ORDER — AMOXICILLIN 500 MG/1
500 CAPSULE ORAL 3 TIMES DAILY
Qty: 21 CAPSULE | Refills: 0 | Status: SHIPPED | OUTPATIENT
Start: 2024-03-26 | End: 2024-04-02

## 2024-03-26 NOTE — PROGRESS NOTES
[facility]        NAME: Tobi Oliveira is a 33 y.o. male  : 1990    MRN: [id number]  DATE: 2024  TIME: 9:32 AM    Assessment and Plan   Acute non-recurrent pansinusitis [J01.40]  1. Acute non-recurrent pansinusitis  amoxicillin (AMOXIL) 500 mg capsule            Patient Instructions     Patient Instructions    Meds as instructed  F/u with PCP in 2-3 days  If symptoms get worse, proceed to the ER        Follow up with PCP in 3-5 days.  Proceed to  ER if symptoms worsen.    Chief Complaint     Chief Complaint   Patient presents with    Sinusitis     Symptoms  started 10 days ago. C/o yellow/ orange mucus and right  side face pressure.          History of Present Illness       33-year-old white male with a chief complaint possibly having a sinus infection.  Patient is complaining of pain in the right side of his cheek and frontal region and productive nasal discharge.  Patient states symptoms started about 10 days ago and went from a green/brown/bloody mucus production.  Patient denies any fever or chills.        Review of Systems   Review of Systems   Constitutional:  Negative for chills and fever.   HENT:  Positive for sinus pressure and sinus pain. Negative for congestion and rhinorrhea.    Eyes:  Negative for discharge and visual disturbance.   Respiratory:  Negative for shortness of breath and wheezing.    Cardiovascular:  Negative for chest pain and palpitations.   Gastrointestinal:  Negative for abdominal pain and vomiting.   Endocrine: Negative for polydipsia and polyuria.   Genitourinary:  Negative for dysuria and hematuria.   Musculoskeletal:  Negative for arthralgias, gait problem and neck stiffness.   Skin:  Negative for rash and wound.   Neurological:  Negative for dizziness and headaches.   Psychiatric/Behavioral:  Negative for confusion and suicidal ideas.          Current Medications       Current Outpatient Medications:     amoxicillin (AMOXIL) 500 mg capsule, Take 1 capsule  (500 mg total) by mouth 3 (three) times a day for 7 days, Disp: 21 capsule, Rfl: 0    losartan (COZAAR) 25 mg tablet, Take 0.5 tablets (12.5 mg total) by mouth daily, Disp: 45 tablet, Rfl: 2    Current Allergies     Allergies as of 03/26/2024 - Reviewed 03/26/2024   Allergen Reaction Noted    Beef-derived products - food allergy Throat Swelling 05/15/2020    Milk (cow) Other (See Comments) 06/19/2017            The following portions of the patient's history were reviewed and updated as appropriate: allergies, current medications, past family history, past medical history, past social history, past surgical history and problem list.     Past Medical History:   Diagnosis Date    Asthma     5/12/21 Pt states has not had any recent issues with asthma    Bleeding per rectum 12/12/2017    Eosinophilic esophagitis     GERD (gastroesophageal reflux disease)     History of migraine headaches     5/12/21 Pt states with migraine headaches approx 2x/week. Pt has family hx and also with seasonal weather changes.     Left shoulder pain 04/02/2020    Lower urinary tract symptoms (LUTS)     Overweight 05/21/2020       Past Surgical History:   Procedure Laterality Date    CYSTOSCOPY      for urethral stricture    ESOPHAGOGASTRODUODENOSCOPY      OH ESOPHAGOGASTRODUODENOSCOPY TRANSORAL DIAGNOSTIC N/A 3/11/2019    Procedure: ESOPHAGOGASTRODUODENOSCOPY (EGD);  Surgeon: Francis Hernandez DO;  Location: [facility];  Service: Gastroenterology    OH VASECTOMY UNI/BI SPX W/POSTOP SEMEN EXAMS Bilateral 5/14/2021    Procedure: VASECTOMY;  Surgeon: Domenic Bolanos MD;  Location: [facility];  Service: Urology    TONSILLECTOMY         Family History   Problem Relation Age of Onset    Migraines Mother     Hypertension Father     No Known Problems Sister     Hip dysplasia Brother     No Known Problems Maternal Grandmother     No Known Problems Maternal Grandfather     No Known Problems Paternal Grandmother     No Known Problems Paternal  Grandfather     No Known Problems Maternal Aunt     No Known Problems Maternal Uncle     No Known Problems Paternal Aunt     No Known Problems Paternal Uncle     No Known Problems Cousin     Heart disease Family     Migraines Family          Medications have been verified.        Objective   /73   Pulse 73   Temp 97.9 °F (36.6 °C)   Resp 18   Wt 87.3 kg (192 lb 6.4 oz)   SpO2 99%   BMI 25.38 kg/m²        Physical Exam     Physical Exam  Vitals and nursing note reviewed.   Constitutional:       General: He is not in acute distress.     Appearance: Normal appearance. He is not ill-appearing, toxic-appearing or diaphoretic.      Comments: 33-year-old white male sitting on exam table complaining of right cheek pain   HENT:      Head: Normocephalic and atraumatic.      Nose: Nose normal.      Comments:    Positive tenderness to the right maxillary and right frontal sinus     Mouth/Throat:      Mouth: Mucous membranes are moist.      Pharynx: Oropharynx is clear. No oropharyngeal exudate or posterior oropharyngeal erythema.   Eyes:      Extraocular Movements: Extraocular movements intact.      Pupils: Pupils are equal, round, and reactive to light.   Neck:      Vascular: No carotid bruit.   Cardiovascular:      Rate and Rhythm: Normal rate.      Pulses: Normal pulses.   Pulmonary:      Effort: Pulmonary effort is normal.   Abdominal:      General: Abdomen is flat. Bowel sounds are normal. There is no distension.      Palpations: Abdomen is soft. There is no mass.      Tenderness: There is no abdominal tenderness. There is no right CVA tenderness, left CVA tenderness, guarding or rebound.      Hernia: No hernia is present.   Musculoskeletal:         General: No swelling, tenderness, deformity or signs of injury. Normal range of motion.      Cervical back: Normal range of motion and neck supple. No rigidity. No muscular tenderness.      Right lower leg: No edema.      Left lower leg: No edema.   Lymphadenopathy:       Cervical: No cervical adenopathy.   Skin:     General: Skin is warm and dry.      Coloration: Skin is not jaundiced or pale.      Findings: No bruising, erythema, lesion or rash.   Neurological:      General: No focal deficit present.      Mental Status: He is alert and oriented to person, place, and time.      Cranial Nerves: No cranial nerve deficit.      Motor: No weakness.   Psychiatric:         Mood and Affect: Mood normal.

## 2024-04-25 NOTE — TELEPHONE ENCOUNTER
[facility]  Pre Procedure History & Physical    Chief Complaint:   Cirrhosis  Diverticulitis    Subjective     HPI:   79 y.o. female here for EGD/colon for above issues    Past Medical History:   Past Medical History:   Diagnosis Date    ACE-inhibitor cough     Asthma     Asymptomatic postmenopausal status     Cardiac murmur     Cholelithiasis     Colon polyp 10/2016    Conjunctivitis     right    Diverticulitis of colon 08/23    Elevated LFTs 06/28/2016    Fatty liver     GERD (gastroesophageal reflux disease)     GI (gastrointestinal bleed)     History of colon polyps     HLD (hyperlipidemia)     Hypertension     Hypopigmentation     Left shoulder tendonitis     Menopausal syndrome     Motion sickness     Plantar fasciitis of left foot     nodule    Pruritus     possibly due to Benicar    SOB (shortness of breath)     SOBOE (shortness of breath on exertion)     Thyroid cyst     UTI (urinary tract infection)     Viral syndrome        Past Surgical History:  Past Surgical History:   Procedure Laterality Date    CARDIAC CATHETERIZATION      in the 80's    CARDIAC CATHETERIZATION N/A 05/04/2016    Procedure: Left Heart Cath;  Surgeon: Dale Vasquez MD;  Location: [facility] CATH INVASIVE LOCATION;  Service:     CARDIAC CATHETERIZATION N/A 05/04/2016    Procedure: Coronary angiography;  Surgeon: Dale Vasquez MD;  Location:  [facility];  Service:     CARDIAC CATHETERIZATION N/A 05/04/2016    Procedure: Left ventriculography;  Surgeon: Dale Vasquez MD;  [facility];  Service:     CHOLECYSTECTOMY  1980    COLONOSCOPY      >5 years    COLONOSCOPY  12/01/2016    tics, IH, rectal biopsy showed changes suggestive of Schwannoma     ENDOSCOPY  12/01/2016    LA grade B esophagitis, mild Schatzki ring, HH, gastric polyp, erythematous mucosa in stomach    LASIK Bilateral     RECTAL ULTRASOUND N/A 02/07/2017    Procedure: ENDOSCOPIC ULTRASOUND (LOWER);   Called left  to remind patient to get labs done prior to 11/09/23 fu appt with MAY Medina. "Surgeon: Casper Rodríguez MD;  Location: [facility] ENDOSCOPY;  Service:     SHOULDER SURGERY Left 06/17/2010    Dr. Green; repair L shoulder adhesive capsulitis    SKIN BIOPSY      TOTAL ABDOMINAL HYSTERECTOMY  1989    fibroid tumors    TUBAL ABDOMINAL LIGATION         Family History:  Family History   Problem Relation Age of Onset    Anxiety disorder Mother     Uterine cancer Mother     Dementia Mother     Cancer Father         Prostate Cancer     Other Father         AAA, aneurysm rupture    Leukemia Brother     [place] Hyperthermia Neg Hx        Social History:   reports that she quit smoking about 30 years ago. Her smoking use included cigarettes. She started smoking about 60 years ago. She has a 30 pack-year smoking history. She has never used smokeless tobacco. She reports that she does not drink alcohol and does not use drugs.    Medications:   Medications Prior to Admission   Medication Sig Dispense Refill Last Dose    atorvastatin (LIPITOR) 10 MG tablet Take 1 tablet by mouth Every Night for 270 days. 90 tablet 2 4/23/2024    Gemtesa 75 MG tablet Take 1 tablet by mouth Daily for 360 days. 30 tablet 11 4/23/2024    valsartan (DIOVAN) 320 MG tablet Take 1 tablet by mouth Every Night for 270 days. 90 tablet 2 4/23/2024       Allergies:  Lisinopril    ROS:    Pertinent items are noted in HPI     Objective     Blood pressure 143/61, pulse 76, temperature 98 °F (36.7 °C), temperature source Oral, resp. rate 12, height 163.8 cm (64.5\"), weight 91 kg (200 lb 9.6 oz), SpO2 95%.    Physical Exam   Constitutional: Pt is oriented to person, place, and time and well-developed, well-nourished, and in no distress.   Mouth/Throat: Oropharynx is clear and moist.   Neck: Normal range of motion.   Cardiovascular: Normal rate, regular rhythm and normal heart sounds.    Pulmonary/Chest: Effort normal and breath sounds normal.   Abdominal: Soft. Nontender  Skin: Skin is warm and dry.   Psychiatric: Mood, memory, affect and " judgment normal.     Assessment & Plan     Diagnosis:  Cirrhosis  Diverticulitis    Anticipated Surgical Procedure:  EGD  Colonoscopy    The risks, benefits, and alternatives of this procedure have been discussed with the patient or the responsible party- the patient understands and agrees to proceed.

## 2024-05-03 ENCOUNTER — APPOINTMENT (OUTPATIENT)
Age: 34
End: 2024-05-03
Payer: COMMERCIAL

## 2024-05-03 DIAGNOSIS — R80.1 PERSISTENT PROTEINURIA: ICD-10-CM

## 2024-05-03 DIAGNOSIS — I10 BENIGN ESSENTIAL HYPERTENSION: ICD-10-CM

## 2024-05-03 LAB
ANION GAP SERPL CALCULATED.3IONS-SCNC: 5 MMOL/L (ref 4–13)
BACTERIA UR QL AUTO: NORMAL /HPF
BILIRUB UR QL STRIP: NEGATIVE
BUN SERPL-MCNC: 9 MG/DL (ref 5–25)
CALCIUM SERPL-MCNC: 9 MG/DL (ref 8.4–10.2)
CHLORIDE SERPL-SCNC: 106 MMOL/L (ref 96–108)
CLARITY UR: CLEAR
CO2 SERPL-SCNC: 28 MMOL/L (ref 21–32)
COLOR UR: COLORLESS
CREAT SERPL-MCNC: 0.72 MG/DL (ref 0.6–1.3)
CREAT UR-MCNC: 28.9 MG/DL
GFR SERPL CREATININE-BSD FRML MDRD: 120 ML/MIN/1.73SQ M
GLUCOSE P FAST SERPL-MCNC: 95 MG/DL (ref 65–99)
GLUCOSE UR STRIP-MCNC: NEGATIVE MG/DL
HGB UR QL STRIP.AUTO: NEGATIVE
KETONES UR STRIP-MCNC: NEGATIVE MG/DL
LEUKOCYTE ESTERASE UR QL STRIP: NEGATIVE
MICROALBUMIN UR-MCNC: <7 MG/L
MICROALBUMIN/CREAT 24H UR: <24 MG/G CREATININE (ref 0–30)
NITRITE UR QL STRIP: NEGATIVE
NON-SQ EPI CELLS URNS QL MICRO: NORMAL /HPF
PH UR STRIP.AUTO: 6.5 [PH]
POTASSIUM SERPL-SCNC: 4.1 MMOL/L (ref 3.5–5.3)
PROT UR STRIP-MCNC: NEGATIVE MG/DL
RBC #/AREA URNS AUTO: NORMAL /HPF
SODIUM SERPL-SCNC: 139 MMOL/L (ref 135–147)
SP GR UR STRIP.AUTO: 1 (ref 1–1.03)
UROBILINOGEN UR STRIP-ACNC: <2 MG/DL
WBC #/AREA URNS AUTO: NORMAL /HPF

## 2024-05-03 PROCEDURE — 81001 URINALYSIS AUTO W/SCOPE: CPT

## 2024-05-03 PROCEDURE — 82043 UR ALBUMIN QUANTITATIVE: CPT

## 2024-05-03 PROCEDURE — 82570 ASSAY OF URINE CREATININE: CPT

## 2024-05-03 PROCEDURE — 36415 COLL VENOUS BLD VENIPUNCTURE: CPT

## 2024-05-03 PROCEDURE — 80048 BASIC METABOLIC PNL TOTAL CA: CPT

## 2024-05-08 ENCOUNTER — OFFICE VISIT (OUTPATIENT)
Age: 34
End: 2024-05-08

## 2024-05-08 VITALS — HEIGHT: 73 IN | BODY MASS INDEX: 25.31 KG/M2 | WEIGHT: 191 LBS | TEMPERATURE: 98.1 F

## 2024-05-08 DIAGNOSIS — L70.0 ACNE VULGARIS: ICD-10-CM

## 2024-05-08 DIAGNOSIS — D22.9 MULTIPLE MELANOCYTIC NEVI: Primary | ICD-10-CM

## 2024-05-08 DIAGNOSIS — D18.01 CHERRY ANGIOMA: ICD-10-CM

## 2024-05-08 PROCEDURE — 99999 PR OFFICE/OUTPT VISIT,PROCEDURE ONLY: CPT | Performed by: NURSE PRACTITIONER

## 2024-05-08 RX ORDER — ADAPALENE GEL USP, 0.3% 3 MG/G
GEL TOPICAL
Qty: 45 G | Refills: 5 | Status: SHIPPED | OUTPATIENT
Start: 2024-05-08

## 2024-05-08 NOTE — PATIENT INSTRUCTIONS
"TODAY'S PLAN:    PRESCRIPTION MANAGEMENT:  Several treatment options were discussed including topical retinoids and their side effects.    Skin Hygiene:     Wash affected areas (face, chest, and back) TWICE A DAY with a mild cleanser such as Cerave or Cetaphil .  Use only mild cleansers (hypoallergenic and without fragrances) and fragrance free detergent (not \"unscented\" products which contain a masking agent); we discussed avoiding irritants/fragranced products.  Apply a good oil-free facial moisturizer AT LEAST TWO TIMES A DAY \" such as Cetaphil or Cerave.  Minimize the application of oils and cosmetics to the affected skin.  This includes HAIR PRODUCTS such as \"leave in\" conditioners.  Unless the product specifically states that it \"won't cause acne,\" \"won't clog pores,\" and/or \"is non-comedogenic\" then it may actually CAUSE acne.  If you smoke, STOP. Nicotine increases sebum retention and increased scale within the follicles, forming comedones (blackheads and whiteheads).  Abrasive treatments such as dermabrasion and spa facials may aggravate inflammatory acne.  Do NOT scratch or pick your acne bumps.  The evidence that diet directly affects acne remains weak.  However, diet does affect your overall health.  Eat plenty of fresh fruit and vegetables.  Avoid protein or amino acid supplements, particularly if they contain leucine. Consider a low-glycemic, low-protein and low-dairy diet.  Be mindful that certain medications may cause of aggravate acne.  Make sure to tell your Dermatologist if you start a new prescription, nutritional supplement, and/or herbal remedy.     MORNING Topical Regimen:     NONE.     EVENING Topical Regimen:     Adapalene 0.3% gel AT LEAST 1 HOUR BEFORE BEDTIME:  Evenly spread a SINGLE pea-sized amount of this medication over your entire face, avoiding the eyes and corners of the mouth.     SYSTEMIC Strategies:     NONE       "

## 2024-05-08 NOTE — PROGRESS NOTES
"[facility] Note     Patient Name: Tobi Oliveira  Encounter Date: 5/8/24     Have you been cared for by a [facility] in the last 3 years and, if so, which description applies to you?    Yes.  I have been here within the last 3 years, and my medical history has NOT changed since that time.  I am MALE/not capable of bearing children.    REVIEW OF SYSTEMS:  Have you recently had or currently have any of the following? No changes in my recent health.   PAST MEDICAL HISTORY:  Have you personally ever had or currently have any of the following?  If \"YES,\" then please provide more detail. No changes in my medical history.   HISTORY OF IMMUNOSUPPRESSION: Do you have a history of any of the following:  Systemic Immunosuppression such as Diabetes, Biologic or Immunotherapy, Chemotherapy, Organ Transplantation, Bone Marrow Transplantation?  No     Answering \"YES\" requires the addition of the dotphrase \"IMMUNOSUPPRESSED\" as the first diagnosis of the patient's visit.   FAMILY HISTORY:  Any \"first degree relatives\" (parent, brother, sister, or child) with the following?    No changes in my family's known health.   PATIENT EXPERIENCE:    Do you want the Dermatologist to perform a COMPLETE skin exam today including a clinical examination under the \"bra and underwear\" areas?  NO, examined all areas EXCEPT for under the underwear  If necessary, do we have your permission to call and leave a detailed message on your Preferred Phone number that includes your specific medical information?  Yes      Allergies   Allergen Reactions    Beef-Derived Products - Food Allergy Throat Swelling     EOE    Milk (Cow) Other (See Comments)     Milk allergy -eosinophilic esophagitis      Current Outpatient Medications:     losartan (COZAAR) 25 mg tablet, Take 0.5 tablets (12.5 mg total) by mouth daily, Disp: 45 tablet, Rfl: 2        Patient present for skin check, and skin tag below right eye, and acne on forehead, " "present for about 6 months. Patient had sinus infection and was taking amoxicillin and acne all went away but as soon as he stopped it they came back.    Whom besides the patient is providing clinical information about today's encounter?   NO ADDITIONAL HISTORIAN (patient alone provided history)    Physical Exam and Assessment/Plan by Diagnosis:      MELANOCYTIC NEVI (\"Moles\")    Physical Exam:  Anatomic Location Affected: Mostly on sun-exposed areas of the trunk and extremities  Morphological Description:  Scattered, 1-4mm round to ovoid, symmetrical-appearing, even bordered, skin colored to dark brown macules/papules, mostly in sun-exposed areas      Additional History of Present Condition:  present for years.    Assessment and Plan:  Based on a thorough discussion of this condition and the management approach to it (including a comprehensive discussion of the known risks, side effects and potential benefits of treatment), the patient (family) agrees to implement the following specific plan:  Provided handout with information regarding the ABCDE's of moles   Recommend routine skin exams every year   Sun avoidance, protective clothing (known as UPF clothing), and the use of at least SPF 30 sunscreens is advised. Sunscreen should be reapplied every two hours when outside.       SEBORRHEIC KERATOSIS; NON-INFLAMED    Physical Exam:  Anatomic Location Affected:  scattered across trunk, extremities,  face  Morphological Description:  Flat and raised, waxy, smooth to warty textured, yellow to brownish-grey to dark brown to blackish, discrete, \"stuck-on\" appearing papules.      Additional History of Present Condition:  Patient reports new bumps on the skin.  Denies itch, burn, pain, bleeding or ulceration.  Present constantly; nothing seems to make it worse or better.  No prior treatment.      Assessment and Plan:  Based on a thorough discussion of this condition and the management approach to it (including a " "comprehensive discussion of the known risks, side effects and potential benefits of treatment), the patient (family) agrees to implement the following specific plan:  Reassured benign        ANGIOMA (\"CHERRY ANGIOMA\")    Physical Exam:  Anatomic Location: scattered across sun exposed areas of the trunk and extremities   Morphologic Description: Firm red to reddish-blue discrete papules  Pertinent Positives:  Pertinent Negatives:    Additional History of Present Condition:  Present on exam.     Assessment and Plan:  Reassured benign        ACNE VULGARIS    Physical Exam:  Anatomic Locations Involved: OTHER: forehead  Global Assessment: ALMOST CLEAR: A few scattered comedones and a few small inflammatory papules.   Scarring Present? NONE      Additional History of Present Condition:  present x 6 months.        TODAY'S PLAN:     PRESCRIPTION MANAGEMENT:  Several treatment options were discussed including topical retinoids and their side effects.     Skin Hygiene:      Wash affected areas (face, chest, and back) TWICE A DAY with a mild cleanser such as Cerave or Cetaphil .  Use only mild cleansers (hypoallergenic and without fragrances) and fragrance free detergent (not \"unscented\" products which contain a masking agent); we discussed avoiding irritants/fragranced products.  Apply a good oil-free facial moisturizer AT LEAST TWO TIMES A DAY \" such as Cetaphil or Cerave.  Minimize the application of oils and cosmetics to the affected skin.  This includes HAIR PRODUCTS such as \"leave in\" conditioners.  Unless the product specifically states that it \"won't cause acne,\" \"won't clog pores,\" and/or \"is non-comedogenic\" then it may actually CAUSE acne.  If you smoke, STOP. Nicotine increases sebum retention and increased scale within the follicles, forming comedones (blackheads and whiteheads).  Abrasive treatments such as dermabrasion and spa facials may aggravate inflammatory acne.  Do NOT scratch or pick your acne " bumps.  The evidence that diet directly affects acne remains weak.  However, diet does affect your overall health.  Eat plenty of fresh fruit and vegetables.  Avoid protein or amino acid supplements, particularly if they contain leucine. Consider a low-glycemic, low-protein and low-dairy diet.  Be mindful that certain medications may cause of aggravate acne.  Make sure to tell your Dermatologist if you start a new prescription, nutritional supplement, and/or herbal remedy.      MORNING Topical Regimen:      NONE.      EVENING Topical Regimen:      Adapalene 0.3% gel AT LEAST 1 HOUR BEFORE BEDTIME:  Evenly spread a SINGLE pea-sized amount of this medication over your entire face, avoiding the eyes and corners of the mouth.      SYSTEMIC Strategies:      NONE                          Scribe Attestation      I,:  Angy Frost am acting as a scribe while in the presence of the attending physician.:       I,:  MAY Pina personally performed the services described in this documentation    as scribed in my presence.:

## 2024-05-10 ENCOUNTER — TELEPHONE (OUTPATIENT)
Age: 34
End: 2024-05-10

## 2024-05-10 NOTE — TELEPHONE ENCOUNTER
PA for Adapalene 0.3% gel    Submitted via    []CMM-KEY   [x]Surescripts-Case ID # [id number]   []Faxed to plan   []Other website   []Phone call Case ID #     Office notes sent, clinical questions answered. Awaiting determination    Turnaround time for your insurance to make a decision on your Prior Authorization can take 7-21 business days.

## 2024-05-10 NOTE — TELEPHONE ENCOUNTER
PA for Adapalene 0.3% gel Approved     Date(s) approved 05/10/2024 - 05/10/2025    Case #[id number]     Patient advised by          [x] Micromusclet Message  [x] Phone call   []LMOM  []L/M to call office as no active Communication consent on file  []Unable to leave detailed message as VM not approved on Communication consent       Pharmacy advised by    [x]Fax  []Phone call    Approval letter scanned into Media Yes

## 2024-05-13 NOTE — PROGRESS NOTES
NEPHROLOGY OFFICE NOTE    Patient: Tobi Oliveira               Sex: male           YOB: 1990        Age:  34 y.o.       5/14/2024      BACKGROUND     I had the pleasure of seeing Tobi Oliveira in the nephrology office today for follow-up. He has a past medical history significant for GERD with esophagitis, hypothyroidism, asthma, hypertension, and migraine headaches. He is following with our office for persistent proteinuria.      He has underlying hypertension, currently maintained on losartan.     He has a history of eosinophilic esophagitis and GERD, previously taking pantoprazole 40 mg daily. Reports that he has been off of Protonix and doing well.     He has a longstanding history of migraines with documented use of NSAID and Imitrex.  Reports that he is periodically using Imitrex but not taking additional NSAIDs at present.    Underwent cystoscopy with Dr. Sands on 1/22/2024 for urinary hesitancy and frequency. Reports he was told that there were no abnormal findings.    Reports he has been doing well since his last office visit.  He is using Tylenol as needed for headaches, does report headaches are less frequent than they used to be.    Most recent labs were obtained on 5/3/2024, which we have reviewed together.     SUBJECTIVE     He currently has no complaints at this time and is feeling well. Patient denies any chest pain, shortness of breath, or swelling.    REVIEW OF SYSTEMS     Review of Systems   Constitutional:  Negative for fatigue and fever.   HENT:  Negative for hearing loss, nosebleeds and trouble swallowing.    Respiratory:  Negative for shortness of breath.    Cardiovascular:  Negative for chest pain and leg swelling.   Gastrointestinal:  Negative for nausea and vomiting.   Genitourinary:  Positive for frequency. Negative for difficulty urinating, dysuria and hematuria.   Musculoskeletal:  Negative for back pain.   Skin:  Negative for pallor.   Neurological:  Negative for  dizziness, syncope, weakness and light-headedness.   Psychiatric/Behavioral:  Negative for sleep disturbance. The patient is not nervous/anxious.        OBJECTIVE     Current Weight: Weight - Scale: 88.4 kg (194 lb 12.8 oz)  Vitals:    05/14/24 1551   BP: 122/84   Pulse: 77   Resp: 18   Temp: 97.8 °F (36.6 °C)   SpO2: 98%     Body mass index is 25.7 kg/m².    CURRENT MEDICATIONS       Current Outpatient Medications:     Adapalene 0.3 % gel, Apply pea-sized amount to forehead at bedtime, Disp: 45 g, Rfl: 5    losartan (COZAAR) 25 mg tablet, Take 0.5 tablets (12.5 mg total) by mouth daily, Disp: 45 tablet, Rfl: 2    PHYSICAL EXAMINATION     Physical Exam  Vitals reviewed.   Constitutional:       General: He is not in acute distress.  HENT:      Head: Normocephalic.      Mouth/Throat:      Lips: Pink.      Mouth: Mucous membranes are moist.   Eyes:      General: Lids are normal. No scleral icterus.  Cardiovascular:      Rate and Rhythm: Normal rate.   Pulmonary:      Effort: Pulmonary effort is normal. No accessory muscle usage or respiratory distress.   Abdominal:      General: There is no distension.   Musculoskeletal:      Cervical back: Normal range of motion and neck supple. No tenderness.      Right lower leg: No edema.      Left lower leg: No edema.   Skin:     General: Skin is warm.      Coloration: Skin is not cyanotic or jaundiced.   Neurological:      General: No focal deficit present.      Mental Status: He is alert and oriented to person, place, and time.   Psychiatric:         Attention and Perception: Attention normal.         Speech: Speech normal.         Behavior: Behavior is cooperative.           LAB RESULTS     BMP 5/3/2024:  Sodium 139  Potassium 4.1  Chloride 106  CO2 28  Anion Gap 5  BUN 9  Creatinine 0.72  eGFR 120      RADIOLOGY RESULTS      US Kidney and Bladder 6/20/2023:  FINDINGS:     KIDNEYS:  Symmetric and normal size.  Right kidney:  12.9 x 4.8 x 5.3 cm. Volume 173.0 mL  Left kidney:   11.8 x 5.7 x 5.3 cm.  Volume 188.1 mL     Right kidney  Normal echogenicity and contour.  No mass is identified.  No hydronephrosis.  No shadowing calculi.  No perinephric fluid collections.     Left kidney  Normal echogenicity and contour.  No mass is identified.  No hydronephrosis.  No shadowing calculi.  No perinephric fluid collections.     URETERS:  Nonvisualized.     BLADDER:  Normally distended.  No focal thickening or mass lesions.  Bilateral ureteral jets detected.  Prevoid: 338.1  No significant post void volume. Measured post void volume in mL: 7.4        IMPRESSION:     Unremarkable renal sonogram.      ASSESSMENT/PLAN     Persistent Proteinuria:  After review of the medical record, it appears baseline creatinine level fluctuates around 0.7 - 0.8. Current creatinine level is 0.72 with an eGFR of 120 and preserved.     He was noted to have the presence of proteinuria dating back through June of 2023. He had previously undergone serological work-up in June of 2023 - HIV negative, SPEP and UPEP showed no M spike, hepatitis B and C nonreactive, cryoglobulin negative, UGO negative, C3 and C4 within normal limits, and anti-double-stranded DNA were all negative.      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 He was initiated on losartan 12.5 mg daily. Most recent urinalysis showed no hematuria or proteinuria, urine microalbumin to creatinine ratio within normal limits.     Advised patient we will continue current regimen and repeat BMP, urinalysis, and urine microalbumin to creatinine ratio prior to follow-up.  Advised caution with NSAIDs and would recommend use of Tylenol instead for pain management.     Hypertension:  Maintained on losartan 12.5 mg daily.  Pressure within acceptable range on the current regimen.  I advise he continue monitoring his blood pressure at home and follow a lower salt diet.      Recommend nephrology  "follow-up in 1 year.     MAY Moyer  Nephrology  5/14/2024      Portions of the record may have been created with voice recognition software. Occasional wrong word or \"sound a like\" substitutions may have occurred due to the inherent limitations of voice recognition software. Read the chart carefully and recognize, using context, where substitutions have occurred.   "

## 2024-05-14 ENCOUNTER — OFFICE VISIT (OUTPATIENT)
Dept: NEPHROLOGY | Facility: CLINIC | Age: 34
End: 2024-05-14
Payer: COMMERCIAL

## 2024-05-14 VITALS
BODY MASS INDEX: 25.82 KG/M2 | SYSTOLIC BLOOD PRESSURE: 122 MMHG | WEIGHT: 194.8 LBS | DIASTOLIC BLOOD PRESSURE: 84 MMHG | RESPIRATION RATE: 18 BRPM | HEIGHT: 73 IN | OXYGEN SATURATION: 98 % | HEART RATE: 77 BPM | TEMPERATURE: 97.8 F

## 2024-05-14 DIAGNOSIS — R80.1 PERSISTENT PROTEINURIA: Primary | ICD-10-CM

## 2024-05-14 DIAGNOSIS — I10 BENIGN ESSENTIAL HYPERTENSION: ICD-10-CM

## 2024-05-14 PROCEDURE — 99214 OFFICE O/P EST MOD 30 MIN: CPT

## 2024-05-14 NOTE — LETTER
May 14, 2024     Alma Baker MD  [address]  [address]  [facility] [address]    Patient: Tobi Oliveira   YOB: 1990   Date of Visit: 5/14/2024       Dear Dr. Baker:    Thank you for referring Tobi Oliveira to me for evaluation. Below are my notes for this consultation.    If you have questions, please do not hesitate to call me. I look forward to following your patient along with you.         Sincerely,        MAY Moyer        CC: No Recipients    MAY Moyer  5/14/2024  4:09 PM  Sign when Signing Visit  NEPHROLOGY OFFICE NOTE    Patient: Tobi Oliveira               Sex: male           YOB: 1990        Age:  34 y.o.       5/14/2024      BACKGROUND     I had the pleasure of seeing Tobi Oliveira in the nephrology office today for follow-up. He has a past medical history significant for GERD with esophagitis, hypothyroidism, asthma, hypertension, and migraine headaches. He is following with our office for persistent proteinuria.      He has underlying hypertension, currently maintained on losartan.     He has a history of eosinophilic esophagitis and GERD, previously taking pantoprazole 40 mg daily. Reports that he has been off of Protonix and doing well.     He has a longstanding history of migraines with documented use of NSAID and Imitrex.  Reports that he is periodically using Imitrex but not taking additional NSAIDs at present.    Underwent cystoscopy with Dr. Sands on 1/22/2024 for urinary hesitancy and frequency. Reports he was told that there were no abnormal findings.    Reports he has been doing well since his last office visit.  He is using Tylenol as needed for headaches, does report headaches are less frequent than they used to be.    Most recent labs were obtained on 5/3/2024, which we have reviewed together.     SUBJECTIVE     He currently has no complaints at this time and is feeling well. Patient denies any chest pain,  shortness of breath, or swelling.    REVIEW OF SYSTEMS     Review of Systems   Constitutional:  Negative for fatigue and fever.   HENT:  Negative for hearing loss, nosebleeds and trouble swallowing.    Respiratory:  Negative for shortness of breath.    Cardiovascular:  Negative for chest pain and leg swelling.   Gastrointestinal:  Negative for nausea and vomiting.   Genitourinary:  Positive for frequency. Negative for difficulty urinating, dysuria and hematuria.   Musculoskeletal:  Negative for back pain.   Skin:  Negative for pallor.   Neurological:  Negative for dizziness, syncope, weakness and light-headedness.   Psychiatric/Behavioral:  Negative for sleep disturbance. The patient is not nervous/anxious.        OBJECTIVE     Current Weight: Weight - Scale: 88.4 kg (194 lb 12.8 oz)  Vitals:    05/14/24 1551   BP: 122/84   Pulse: 77   Resp: 18   Temp: 97.8 °F (36.6 °C)   SpO2: 98%     Body mass index is 25.7 kg/m².    CURRENT MEDICATIONS       Current Outpatient Medications:   •  Adapalene 0.3 % gel, Apply pea-sized amount to forehead at bedtime, Disp: 45 g, Rfl: 5  •  losartan (COZAAR) 25 mg tablet, Take 0.5 tablets (12.5 mg total) by mouth daily, Disp: 45 tablet, Rfl: 2    PHYSICAL EXAMINATION     Physical Exam  Vitals reviewed.   Constitutional:       General: He is not in acute distress.  HENT:      Head: Normocephalic.      Mouth/Throat:      Lips: Pink.      Mouth: Mucous membranes are moist.   Eyes:      General: Lids are normal. No scleral icterus.  Cardiovascular:      Rate and Rhythm: Normal rate.   Pulmonary:      Effort: Pulmonary effort is normal. No accessory muscle usage or respiratory distress.   Abdominal:      General: There is no distension.   Musculoskeletal:      Cervical back: Normal range of motion and neck supple. No tenderness.      Right lower leg: No edema.      Left lower leg: No edema.   Skin:     General: Skin is warm.      Coloration: Skin is not cyanotic or jaundiced.   Neurological:       General: No focal deficit present.      Mental Status: He is alert and oriented to person, place, and time.   Psychiatric:         Attention and Perception: Attention normal.         Speech: Speech normal.         Behavior: Behavior is cooperative.           LAB RESULTS     [facility] 5/3/2024:  Sodium 139  Potassium 4.1  Chloride 106  CO2 28  Anion Gap 5  BUN 9  Creatinine 0.72  eGFR 120      RADIOLOGY RESULTS      US Kidney and Bladder 6/20/2023:  FINDINGS:     KIDNEYS:  Symmetric and normal size.  Right kidney:  12.9 x 4.8 x 5.3 cm. Volume 173.0 mL  Left kidney:  11.8 x 5.7 x 5.3 cm.  Volume 188.1 mL     Right kidney  Normal echogenicity and contour.  No mass is identified.  No hydronephrosis.  No shadowing calculi.  No perinephric fluid collections.     Left kidney  Normal echogenicity and contour.  No mass is identified.  No hydronephrosis.  No shadowing calculi.  No perinephric fluid collections.     URETERS:  Nonvisualized.     BLADDER:  Normally distended.  No focal thickening or mass lesions.  Bilateral ureteral jets detected.  Prevoid: 338.1  No significant post void volume. Measured post void volume in mL: 7.4        IMPRESSION:     Unremarkable renal sonogram.      ASSESSMENT/PLAN     Persistent Proteinuria:  After review of the medical record, it appears baseline creatinine level fluctuates around 0.7 - 0.8. Current creatinine level is 0.72 with an eGFR of 120 and preserved.     He was noted to have the presence of proteinuria dating back through June of 2023. He had previously undergone serological work-up in June of 2023 - HIV negative, SPEP and UPEP showed no M spike, hepatitis B and C nonreactive, cryoglobulin negative, UGO negative, C3 and C4 within normal limits, and anti-double-stranded DNA were all negative.      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 He was initiated on losartan 12.5 mg daily. Most recent urinalysis  "showed no hematuria or proteinuria, urine microalbumin to creatinine ratio within normal limits.     Advised patient we will continue current regimen and repeat BMP, urinalysis, and urine microalbumin to creatinine ratio prior to follow-up.  Advised caution with NSAIDs and would recommend use of Tylenol instead for pain management.     Hypertension:  Maintained on losartan 12.5 mg daily.  Pressure within acceptable range on the current regimen.  I advise he continue monitoring his blood pressure at home and follow a lower salt diet.      Recommend nephrology follow-up in 1 year.     MAY Moyer  Nephrology  5/14/2024      Portions of the record may have been created with voice recognition software. Occasional wrong word or \"sound a like\" substitutions may have occurred due to the inherent limitations of voice recognition software. Read the chart carefully and recognize, using context, where substitutions have occurred.   "

## 2024-05-17 ENCOUNTER — OFFICE VISIT (OUTPATIENT)
Age: 34
End: 2024-05-17
Payer: COMMERCIAL

## 2024-05-17 ENCOUNTER — APPOINTMENT (OUTPATIENT)
Age: 34
End: 2024-05-17
Payer: COMMERCIAL

## 2024-05-17 VITALS
SYSTOLIC BLOOD PRESSURE: 118 MMHG | BODY MASS INDEX: 25.31 KG/M2 | HEIGHT: 73 IN | RESPIRATION RATE: 18 BRPM | OXYGEN SATURATION: 98 % | TEMPERATURE: 97.4 F | DIASTOLIC BLOOD PRESSURE: 76 MMHG | HEART RATE: 88 BPM | WEIGHT: 191 LBS

## 2024-05-17 DIAGNOSIS — Z00.00 ANNUAL PHYSICAL EXAM: Primary | ICD-10-CM

## 2024-05-17 DIAGNOSIS — R80.1 PERSISTENT PROTEINURIA: ICD-10-CM

## 2024-05-17 DIAGNOSIS — K20.0 EOSINOPHILIC ESOPHAGITIS: ICD-10-CM

## 2024-05-17 DIAGNOSIS — K21.00 GASTROESOPHAGEAL REFLUX DISEASE WITH ESOPHAGITIS WITHOUT HEMORRHAGE: ICD-10-CM

## 2024-05-17 DIAGNOSIS — J45.40 ASTHMA IN ADULT, MODERATE PERSISTENT, UNCOMPLICATED: ICD-10-CM

## 2024-05-17 DIAGNOSIS — I10 BENIGN ESSENTIAL HYPERTENSION: ICD-10-CM

## 2024-05-17 DIAGNOSIS — Z00.00 ANNUAL PHYSICAL EXAM: ICD-10-CM

## 2024-05-17 LAB
ALBUMIN SERPL BCP-MCNC: 4.7 G/DL (ref 3.5–5)
ALP SERPL-CCNC: 97 U/L (ref 34–104)
ALT SERPL W P-5'-P-CCNC: 23 U/L (ref 7–52)
AST SERPL W P-5'-P-CCNC: 22 U/L (ref 13–39)
BILIRUB DIRECT SERPL-MCNC: 0.15 MG/DL (ref 0–0.2)
BILIRUB SERPL-MCNC: 0.73 MG/DL (ref 0.2–1)
CHOLEST SERPL-MCNC: 156 MG/DL
ERYTHROCYTE [DISTWIDTH] IN BLOOD BY AUTOMATED COUNT: 11.8 % (ref 11.6–15.1)
HCT VFR BLD AUTO: 46.4 % (ref 36.5–49.3)
HDLC SERPL-MCNC: 46 MG/DL
HGB BLD-MCNC: 15.1 G/DL (ref 12–17)
LDLC SERPL CALC-MCNC: 96 MG/DL (ref 0–100)
MCH RBC QN AUTO: 30 PG (ref 26.8–34.3)
MCHC RBC AUTO-ENTMCNC: 32.5 G/DL (ref 31.4–37.4)
MCV RBC AUTO: 92 FL (ref 82–98)
PLATELET # BLD AUTO: 291 THOUSANDS/UL (ref 149–390)
PMV BLD AUTO: 11.7 FL (ref 8.9–12.7)
PROT SERPL-MCNC: 7.1 G/DL (ref 6.4–8.4)
RBC # BLD AUTO: 5.04 MILLION/UL (ref 3.88–5.62)
T4 FREE SERPL-MCNC: 0.65 NG/DL (ref 0.61–1.12)
TRIGL SERPL-MCNC: 70 MG/DL
TSH SERPL DL<=0.05 MIU/L-ACNC: 5 UIU/ML (ref 0.45–4.5)
WBC # BLD AUTO: 5.94 THOUSAND/UL (ref 4.31–10.16)

## 2024-05-17 PROCEDURE — 84439 ASSAY OF FREE THYROXINE: CPT

## 2024-05-17 PROCEDURE — 99395 PREV VISIT EST AGE 18-39: CPT | Performed by: FAMILY MEDICINE

## 2024-05-17 PROCEDURE — 80076 HEPATIC FUNCTION PANEL: CPT

## 2024-05-17 PROCEDURE — 80061 LIPID PANEL: CPT

## 2024-05-17 PROCEDURE — 84443 ASSAY THYROID STIM HORMONE: CPT

## 2024-05-17 PROCEDURE — 36415 COLL VENOUS BLD VENIPUNCTURE: CPT

## 2024-05-17 PROCEDURE — 99214 OFFICE O/P EST MOD 30 MIN: CPT | Performed by: FAMILY MEDICINE

## 2024-05-17 PROCEDURE — 85027 COMPLETE CBC AUTOMATED: CPT

## 2024-05-17 NOTE — PROGRESS NOTES
Adult Annual Physical  Name: Tobi Oliveira      : 1990      MRN: [id number]  Encounter Provider: Moon River DO  Encounter Date: 2024   Encounter department: [facility] PRIMARY CARE [place]    Assessment & Plan   1. Annual physical exam  -     Hepatic function panel; Future  -     TSH, 3rd generation with Free T4 reflex; Future  -     Lipid Panel with Direct LDL reflex; Future; Expected date: 2024  -     CBC and Platelet; Future  2. Benign essential hypertension-controlled with losartan 12.5 mg daily  3. Eosinophilic esophagitis-longstanding issue.  Asymptomatic at this time despite not being on PPI.  4. Gastroesophageal reflux disease with esophagitis without hemorrhage-stable without PPI use or Tums.  Patient continues to manage via diet  5. Asthma in adult, moderate persistent, uncomplicated-controlled with as needed albuterol use.  Use is very infrequent  6. Persistent proteinuria-resolved with start of ARB therapy.  Follows with nephrology  Immunizations and preventive care screenings were discussed with patient today. Appropriate education was printed on patient's after visit summary.    Counseling:  Dental Health: discussed importance of regular tooth brushing, flossing, and dental visits.         History of Present Illness     Adult Annual Physical:  Patient presents for annual physical. Presents to establish care.  Chronic medical history includes proteinuria and hypertension.  Family history includes Hashimoto's.  Denies family history of prostate cancer or colon cancer.  Works as a  from home.  Denies tobacco use history.  Denies alcohol use.     Diet and Physical Activity:  - Diet/Nutrition:. vegan  - Exercise: 5-7 times a week on average.    Depression Screening:  - PHQ-2 Score: 0    General Health:  - Sleep: 7-8 hours of sleep on average.  - Hearing: normal hearing bilateral ears.  - Vision: wears glasses.  - Dental: regular dental visits.      "Health:    - Urinary symptoms: none.     Review of Systems   Respiratory:  Negative for cough and shortness of breath.    Cardiovascular:  Negative for chest pain.   Gastrointestinal:  Negative for constipation and diarrhea.   Musculoskeletal:  Negative for back pain and gait problem.   Neurological:  Negative for headaches.         Objective     /76 (BP Location: Left arm, Patient Position: Sitting, Cuff Size: Standard)   Pulse 88   Temp (!) 97.4 °F (36.3 °C) (Tympanic)   Resp 18   Ht 6' 1\" (1.854 m)   Wt 86.6 kg (191 lb)   SpO2 98%   BMI 25.20 kg/m²     Physical Exam  HENT:      Head: Normocephalic.      Right Ear: External ear normal.      Left Ear: External ear normal.   Eyes:      Extraocular Movements: Extraocular movements intact.      Conjunctiva/sclera: Conjunctivae normal.      Pupils: Pupils are equal, round, and reactive to light.   Cardiovascular:      Rate and Rhythm: Normal rate and regular rhythm.      Heart sounds: No murmur heard.  Pulmonary:      Effort: Pulmonary effort is normal.      Breath sounds: Normal breath sounds.   Abdominal:      General: Bowel sounds are normal.      Palpations: Abdomen is soft.   Musculoskeletal:      Right lower leg: No edema.      Left lower leg: No edema.   Neurological:      Mental Status: He is alert and oriented to person, place, and time.      Gait: Gait normal.   Psychiatric:         Mood and Affect: Mood normal.         Behavior: Behavior normal.             "

## 2024-05-20 ENCOUNTER — TELEPHONE (OUTPATIENT)
Age: 34
End: 2024-05-20

## 2024-05-20 DIAGNOSIS — R79.89 ELEVATED TSH: Primary | ICD-10-CM

## 2024-05-20 NOTE — TELEPHONE ENCOUNTER
----- Message from Moon River DO sent at 5/20/2024  4:06 PM EDT -----  Overall blood work is normal except for his thyroid level.  This is slightly elevated.  This is possibly transient/temporary.  I recommend repeating the thyroid level in 2 weeks to make sure it normalizes

## 2024-05-31 ENCOUNTER — APPOINTMENT (OUTPATIENT)
Age: 34
End: 2024-05-31
Payer: COMMERCIAL

## 2024-05-31 DIAGNOSIS — R79.89 ELEVATED TSH: ICD-10-CM

## 2024-05-31 LAB — TSH SERPL DL<=0.05 MIU/L-ACNC: 3.53 UIU/ML (ref 0.45–4.5)

## 2024-05-31 PROCEDURE — 84443 ASSAY THYROID STIM HORMONE: CPT

## 2024-05-31 PROCEDURE — 36415 COLL VENOUS BLD VENIPUNCTURE: CPT

## 2024-06-06 ENCOUNTER — TELEPHONE (OUTPATIENT)
Age: 34
End: 2024-06-06

## 2024-08-04 DIAGNOSIS — I10 BENIGN ESSENTIAL HYPERTENSION: ICD-10-CM

## 2024-08-04 DIAGNOSIS — R80.1 PERSISTENT PROTEINURIA: ICD-10-CM

## 2024-08-04 RX ORDER — LOSARTAN POTASSIUM 25 MG/1
12.5 TABLET ORAL DAILY
Qty: 45 TABLET | Refills: 1 | Status: SHIPPED | OUTPATIENT
Start: 2024-08-04

## 2024-08-23 ENCOUNTER — OFFICE VISIT (OUTPATIENT)
Dept: OBGYN CLINIC | Facility: CLINIC | Age: 34
End: 2024-08-23
Payer: COMMERCIAL

## 2024-08-23 ENCOUNTER — APPOINTMENT (OUTPATIENT)
Dept: RADIOLOGY | Facility: AMBULARY SURGERY CENTER | Age: 34
End: 2024-08-23
Attending: ORTHOPAEDIC SURGERY
Payer: COMMERCIAL

## 2024-08-23 VITALS
WEIGHT: 191 LBS | HEIGHT: 73 IN | HEART RATE: 61 BPM | SYSTOLIC BLOOD PRESSURE: 130 MMHG | DIASTOLIC BLOOD PRESSURE: 78 MMHG | BODY MASS INDEX: 25.31 KG/M2

## 2024-08-23 DIAGNOSIS — M79.645 PAIN OF FINGER OF LEFT HAND: ICD-10-CM

## 2024-08-23 DIAGNOSIS — M19.041 ARTHRITIS OF FINGER OF RIGHT HAND: Primary | ICD-10-CM

## 2024-08-23 PROCEDURE — 99204 OFFICE O/P NEW MOD 45 MIN: CPT | Performed by: ORTHOPAEDIC SURGERY

## 2024-08-23 PROCEDURE — 73130 X-RAY EXAM OF HAND: CPT

## 2024-08-23 NOTE — PROGRESS NOTES
Assessment:  1. Arthritis of finger of right hand  Diagnostic ultrasound of joints    Ambulatory Referral to PT/OT Hand Therapy      2. Pain of finger of left hand  XR hand 3+ vw right        Patient Active Problem List   Diagnosis    Benign essential hypertension    Eosinophilic esophagitis    Acquired hypothyroidism    Migraine headache    Positive UGO (antinuclear antibody)    Gastro-esophageal reflux disease with esophagitis    Vertiginous migraine    Asthma in adult, moderate persistent, uncomplicated    Persistent proteinuria       Plan:    34 y.o. adult with arthritic changes to DIP joint of the 3rd digit    Patients condition was discussed at length and questions were addressed.  Order was placed to begin hand therapy.  Order was placed for diagnostic ultrasound to assess for possible cyst.  Can follow-up if he has any further complications following the ultrasound.  My suspicion is that there may be a cyst causing irritation to the lateral band or just that he is starting to ulnar deviate with the IP joint causing irritation.  We are going to have him see hand therapy to see if we can get a brace to support this ulnar deviation      The patient was seen and examined by Dr. Mahan and myself. The assessment and plan were formulated by Dr. Mahan and I assisted in carrying it out.    To Do Next Visit:  N/A      Subjective:   Patient ID: Tobi Oliveira is a 34 y.o. adult .    HPI    Patient comes in today with regards to right middle finger.  Patient is referred to us by No ref. provider found for further evaluation. The patient reports that the pain been going on for 2 months.  He states that most of his pain is from gripping, playing football, or making a fist.  He denies any known mechanism of injury.  He has no numbness and tingling at this time.      The following portions of the patient's history were reviewed and updated as appropriate: allergies, current medications, past family history, past social  history, past surgical history and problem list.    Social History     Socioeconomic History    Marital status: /Civil Union     Spouse name: Not on file    Number of children: Not on file    Years of education: Not on file    Highest education level: Not on file   Occupational History    Not on file   Tobacco Use    Smoking status: Never     Passive exposure: Never    Smokeless tobacco: Never    Tobacco comments:     non-smoker   Vaping Use    Vaping status: Never Used   Substance and Sexual Activity    Alcohol use: Not Currently     Comment: Denies    Drug use: Not Currently     Comment: Denies    Sexual activity: Yes     Partners: Female     Birth control/protection: Condom Male, Surgical   Other Topics Concern    Not on file   Social History Narrative    Not on file     Social Determinants of Health     Financial Resource Strain: Not on file   Food Insecurity: Not on file   Transportation Needs: Not on file   Physical Activity: Inactive (5/20/2020)    Exercise Vital Sign     Days of Exercise per Week: 0 days     Minutes of Exercise per Session: 0 min   Stress: Not on file   Social Connections: Not on file   Intimate Partner Violence: Not on file   Housing Stability: Not on file     Past Medical History:   Diagnosis Date    Allergic 1996    Runny nose usually it, skin doesn't do well with poison ivy    Asthma     5/12/21 Pt states has not had any recent issues with asthma    Bleeding per rectum 12/12/2017    Eosinophilic esophagitis     GERD (gastroesophageal reflux disease)     History of migraine headaches     5/12/21 Pt states with migraine headaches approx 2x/week. Pt has family hx and also with seasonal weather changes.     Left shoulder pain 04/02/2020    Lower urinary tract symptoms (LUTS)     Overweight 05/21/2020    Pneumonia 2007     Past Surgical History:   Procedure Laterality Date    CYSTOSCOPY      for urethral stricture    ESOPHAGOGASTRODUODENOSCOPY      AR ESOPHAGOGASTRODUODENOSCOPY  TRANSORAL DIAGNOSTIC N/A 3/11/2019    Procedure: ESOPHAGOGASTRODUODENOSCOPY (EGD);  Surgeon: Francis Hernandez DO;  Location: [facility];  Service: Gastroenterology    WI VASECTOMY UNI/BI SPX W/POSTOP SEMEN EXAMS Bilateral 5/14/2021    Procedure: VASECTOMY;  Surgeon: Domenic Bolanos MD;  Location: [facility];  Service: Urology    TONSILLECTOMY       Allergies   Allergen Reactions    Beef-Derived Products - Food Allergy Throat Swelling     EOE    Milk (Cow) Other (See Comments)     Milk allergy -eosinophilic esophagitis     Current Outpatient Medications on File Prior to Visit   Medication Sig Dispense Refill    losartan (COZAAR) 25 mg tablet TAKE 1/2 TABLET BY MOUTH EVERY DAY 45 tablet 1    Adapalene 0.3 % gel Apply pea-sized amount to forehead at bedtime 45 g 5     No current facility-administered medications on file prior to visit.       Review of Systems   Constitutional:  Negative for chills and fever.   HENT:  Negative for ear pain and sore throat.    Eyes:  Negative for pain and visual disturbance.   Respiratory:  Negative for cough and shortness of breath.    Cardiovascular:  Negative for chest pain and palpitations.   Gastrointestinal:  Negative for abdominal pain and vomiting.   Genitourinary:  Negative for dysuria and hematuria.   Musculoskeletal:  Negative for arthralgias and back pain.   Skin:  Negative for color change and rash.   Neurological:  Negative for seizures and syncope.   All other systems reviewed and are negative.        Objective:    Vitals:    08/23/24 0952   BP: 130/78   Pulse: 61       Physical Exam  Vitals and nursing note reviewed.   Constitutional:       General: He is not in acute distress.     Appearance: He is well-developed.   HENT:      Head: Normocephalic and atraumatic.   Eyes:      Conjunctiva/sclera: Conjunctivae normal.   Cardiovascular:      Rate and Rhythm: Normal rate and regular rhythm.      Heart sounds: No murmur heard.  Pulmonary:      Effort: Pulmonary effort is  "normal. No respiratory distress.      Breath sounds: Normal breath sounds.   Abdominal:      Palpations: Abdomen is soft.      Tenderness: There is no abdominal tenderness.   Musculoskeletal:         General: No swelling.      Cervical back: Neck supple.   Skin:     General: Skin is warm and dry.      Capillary Refill: Capillary refill takes less than 2 seconds.   Neurological:      Mental Status: He is alert.   Psychiatric:         Mood and Affect: Mood normal.         Right Hand Exam     Tenderness   The patient is experiencing tenderness in the radial area (3rd DIP joint).    Range of Motion   The patient has normal right wrist ROM.   Hand   MP Middle: normal   PIP Middle: normal   DIP Middle: normal     Muscle Strength   The patient has normal right wrist strength.    Other   Sensation: normal  Pulse: present    Comments:  Heberden nodes on index and middle finger  Composite flexion pain of 3rd digit  Negative intrinsic position negative  Negative varus  Negative valgus  Extensor digitorum pain  Lateral band tender radial side of finger            Left Hand Exam   Left hand exam is normal.            I have personally reviewed pertinent films in PACS and my interpretation is small arthritic changes to the DIP joint of the third digit on the ulnar aspect.  There is calcification on the ulnar side of the IP joint.    Procedures  No Procedures performed today    Scribe Attestation      I,:  Tim Alcantara am acting as a scribe while in the presence of the attending physician.:       I,:  Vinny Mahan, DO personally performed the services described in this documentation    as scribed in my presence.:               Portions of the record may have been created with voice recognition software.  Occasional wrong word or \"sound a like\" substitutions may have occurred due to the inherent limitations of voice recognition software.  Read the chart carefully and recognize, using context, where substitutions have occurred.   "

## 2025-02-04 DIAGNOSIS — I10 BENIGN ESSENTIAL HYPERTENSION: ICD-10-CM

## 2025-02-04 DIAGNOSIS — R80.1 PERSISTENT PROTEINURIA: ICD-10-CM

## 2025-02-04 RX ORDER — LOSARTAN POTASSIUM 25 MG/1
12.5 TABLET ORAL DAILY
Qty: 45 TABLET | Refills: 0 | Status: SHIPPED | OUTPATIENT
Start: 2025-02-04

## 2025-03-03 NOTE — TELEPHONE ENCOUNTER
Jesika Veronica is a 34 Sched Pt for EGD on 3/11/2019  Went over prep with him  Jesika was seen today for prenatal care.    Diagnoses and all orders for this visit:    Pre-existing type 2 diabetes mellitus during pregnancy in third trimester ([facility])  -     [facility]  OB US LIMITED ([id number])    Chronic hypertension affecting pregnancy ([facility])  -     [facility]  OB US LIMITED ([id number])    Fetal 

## 2025-04-02 ENCOUNTER — OFFICE VISIT (OUTPATIENT)
Age: 35
End: 2025-04-02
Payer: COMMERCIAL

## 2025-04-02 VITALS
BODY MASS INDEX: 25.79 KG/M2 | HEIGHT: 73 IN | WEIGHT: 194.6 LBS | RESPIRATION RATE: 18 BRPM | TEMPERATURE: 96.4 F | OXYGEN SATURATION: 99 % | HEART RATE: 77 BPM

## 2025-04-02 DIAGNOSIS — L23.7 POISON IVY DERMATITIS: Primary | ICD-10-CM

## 2025-04-02 DIAGNOSIS — J45.40 ASTHMA IN ADULT, MODERATE PERSISTENT, UNCOMPLICATED: ICD-10-CM

## 2025-04-02 DIAGNOSIS — I10 BENIGN ESSENTIAL HYPERTENSION: ICD-10-CM

## 2025-04-02 PROCEDURE — 99214 OFFICE O/P EST MOD 30 MIN: CPT

## 2025-04-02 RX ORDER — CLOBETASOL PROPIONATE 0.5 MG/G
CREAM TOPICAL 2 TIMES DAILY
Qty: 30 G | Refills: 1 | Status: SHIPPED | OUTPATIENT
Start: 2025-04-02

## 2025-04-02 RX ORDER — PREDNISONE 10 MG/1
TABLET ORAL
Qty: 30 TABLET | Refills: 0 | Status: SHIPPED | OUTPATIENT
Start: 2025-04-02 | End: 2025-04-12

## 2025-04-02 RX ORDER — ALBUTEROL SULFATE 90 UG/1
2 INHALANT RESPIRATORY (INHALATION) EVERY 6 HOURS PRN
Qty: 18 G | Refills: 5 | Status: SHIPPED | OUTPATIENT
Start: 2025-04-02

## 2025-04-02 RX ORDER — CALAMINE
LOTION (ML) TOPICAL AS NEEDED
Qty: 180 ML | Refills: 0 | Status: SHIPPED | OUTPATIENT
Start: 2025-04-02

## 2025-04-02 NOTE — PROGRESS NOTES
"Name: Tobi Oliveira      : 1990      MRN: [id number]  Encounter Provider: Nu Higgins PA-C  Encounter Date: 2025   Encounter department: [facility] PRIMARY [facility]  :  Assessment & Plan  Poison ivy dermatitis  Given that patient has a rash in multiple areas including the groin region, recommend treating with oral steroids in addition to topical steroids.  Discussed that he will be a steroid taper for 10 days.  Instructed patient to use steroid cream clobetasol only on the arms and chest and to avoid sensitive areas like the groin.  Also prescribed calamine lotion that the patient can use as needed to help calm itching of the lesions.  Instructed patient to follow-up if symptoms worsen or fail to improve.  Orders:  •  calamine lotion; Apply topically as needed for itching  •  clobetasol (TEMOVATE) 0.05 % cream; Apply topically 2 (two) times a day  •  predniSONE 10 mg tablet; 4 tab x 4 days, 3 tabs x 3 days, 2 tabs x 2 days, 1 tab x 1 day then stop    Asthma in adult, moderate persistent, uncomplicated  Stable.  Patient requesting refill of albuterol.  Refill placed.  Follow-up if symptoms worsen or fail to improve.  Orders:  •  albuterol (Ventolin HFA) 90 mcg/act inhaler; Inhale 2 puffs every 6 (six) hours as needed for wheezing    Benign essential hypertension  Blood pressure not taken in the office today due to poison ivy lesions on both arms and risk of spreading contagious poison ivy secretions.  Previous in office blood pressure readings reviewed and have been well-controlled.  Continue losartan 12.5 mg daily.  Call with any questions or concerns.              History of Present Illness   Tobi presents to the office with a complaint of poison ivy rash.  This past Saturday he was weeding and pulling out poison ivy in his garden.  That evening he washed with a poison ivy wash called \"Technu,\" but he does still developed a poison ivy rash starting about 3 days ago.  The rash started on his " "arm and has been spreading since then.  Now he has a rash on both arms, his upper chest, and a small spot in his groin region.  Nothing else tried other than the technu wash.   Patient requesting refill of albuterol.  He will be traveling to [place] on April 10 and worries that his allergies and asthma will flareup in [place].  Normally his asthma is well-controlled and he rarely needs to use albuterol.        Rash  This is a new problem. The current episode started in the past 7 days. The problem has been gradually worsening since onset. The affected locations include the chest, left axilla, left arm, left elbow, left wrist, right axilla, right arm, right elbow and right wrist. The rash is characterized by blistering, draining and itchiness. He was exposed to plant contact and poison ivy/oak. Pertinent negatives include no anorexia, congestion, cough, diarrhea, facial edema, fatigue, fever, joint pain, rhinorrhea, shortness of breath, sore throat or vomiting.     Review of Systems   Constitutional:  Negative for chills, fatigue and fever.   HENT:  Negative for congestion, ear pain, rhinorrhea and sore throat.    Eyes:  Negative for pain and visual disturbance.   Respiratory:  Negative for cough and shortness of breath.    Cardiovascular:  Negative for chest pain and palpitations.   Gastrointestinal:  Negative for abdominal pain, anorexia, constipation, diarrhea and vomiting.   Genitourinary:  Negative for dysuria and hematuria.   Musculoskeletal:  Negative for arthralgias, back pain, joint pain and myalgias.   Skin:  Positive for rash (Itchy). Negative for color change.   Neurological:  Negative for dizziness, seizures, syncope and headaches.   All other systems reviewed and are negative.      Objective   Pulse 77   Temp (!) 96.4 °F (35.8 °C) (Tympanic)   Resp 18   Ht 6' 1\" (1.854 m)   Wt 88.3 kg (194 lb 9.6 oz)   SpO2 99%   BMI 25.67 kg/m²      Physical Exam  Vitals and nursing note reviewed. "   Constitutional:       General: He is not in acute distress.     Appearance: He is well-developed.   HENT:      Head: Normocephalic and atraumatic.   Eyes:      Extraocular Movements: Extraocular movements intact.      Conjunctiva/sclera: Conjunctivae normal.   Cardiovascular:      Rate and Rhythm: Normal rate and regular rhythm.      Heart sounds: No murmur heard.  Pulmonary:      Effort: Pulmonary effort is normal. No respiratory distress.      Breath sounds: Normal breath sounds. No wheezing, rhonchi or rales.   Abdominal:      Palpations: Abdomen is soft.      Tenderness: There is no abdominal tenderness.   Musculoskeletal:         General: No swelling.      Cervical back: Neck supple.   Skin:     General: Skin is warm and dry.      Capillary Refill: Capillary refill takes less than 2 seconds.      Findings: Rash present. Rash is macular and vesicular.      Comments: Small circular rash is located on the patient's bilateral arms and upper chest wall.  Lesions approximately 4 mm or less in diameter.  1 lesion on his right elbow is currently draining clear fluid.   Neurological:      General: No focal deficit present.      Mental Status: He is alert.   Psychiatric:         Mood and Affect: Mood normal.         Behavior: Behavior normal.

## 2025-04-02 NOTE — ASSESSMENT & PLAN NOTE
Blood pressure not taken in the office today due to poison ivy lesions on both arms and risk of spreading contagious poison ivy secretions.  Previous in office blood pressure readings reviewed and have been well-controlled.  Continue losartan 12.5 mg daily.  Call with any questions or concerns.

## 2025-04-02 NOTE — ASSESSMENT & PLAN NOTE
Stable.  Patient requesting refill of albuterol.  Refill placed.  Follow-up if symptoms worsen or fail to improve.  Orders:  •  albuterol (Ventolin HFA) 90 mcg/act inhaler; Inhale 2 puffs every 6 (six) hours as needed for wheezing

## 2025-04-21 ENCOUNTER — OFFICE VISIT (OUTPATIENT)
Age: 35
End: 2025-04-21
Payer: COMMERCIAL

## 2025-04-21 DIAGNOSIS — L23.7 POISON IVY DERMATITIS: ICD-10-CM

## 2025-04-21 PROCEDURE — 99213 OFFICE O/P EST LOW 20 MIN: CPT | Performed by: DERMATOLOGY

## 2025-04-21 RX ORDER — CLOBETASOL PROPIONATE 0.5 MG/G
CREAM TOPICAL 2 TIMES DAILY
Qty: 45 G | Refills: 1 | Status: SHIPPED | OUTPATIENT
Start: 2025-04-21

## 2025-04-21 NOTE — PROGRESS NOTES
"[facility]at[facility] Note     Patient Name: Tobi Oliveira  Encounter Date: 04/21/2025       Have you been cared for by a [facility] Dermatologist in the last 3 years and, if so, which description applies to you? Yes. I have been here within the last 3 years, and my medical history has NOT changed since that time. I am of child-bearing potential.     REVIEW OF SYSTEMS:  Have you recently had or currently have any of the following? No changes in my recent health.   PAST MEDICAL HISTORY:  Have you personally ever had or currently have any of the following?  If \"YES,\" then please provide more detail. No changes in my medical history.   HISTORY OF IMMUNOSUPPRESSION: Do you have a history of any of the following:  Systemic Immunosuppression such as Diabetes, Biologic or Immunotherapy, Chemotherapy, Organ Transplantation, Bone Marrow Transplantation or Prednisone?  No     Answering \"YES\" requires the addition of the dotphrase \"IMMUNOSUPPRESSED\" as the first diagnosis of the patient's visit.   FAMILY HISTORY:  Any \"first degree relatives\" (parent, brother, sister, or child) with the following?    No changes in my family's known health.   PATIENT EXPERIENCE:    Do you want the Dermatologist to perform a COMPLETE skin exam today including a clinical examination under the \"bra and underwear\" areas?  NO  If necessary, do we have your permission to call and leave a detailed message on your Preferred Phone number that includes your specific medical information?  Yes      Allergies   Allergen Reactions   • Beef-Derived Products - Food Allergy Throat Swelling     EOE   • Milk (Cow) Other (See Comments)     Milk allergy -eosinophilic esophagitis   • Poison Ivy Extract Rash      Current Outpatient Medications:   •  clobetasol (TEMOVATE) 0.05 % cream, Apply topically 2 (two) times a day, Disp: 45 g, Rfl: 1  •  Adapalene 0.3 % gel, Apply pea-sized amount to forehead at bedtime (Patient not taking: Reported on 4/2/2025), Disp: " "45 g, Rfl: 5  •  albuterol (Ventolin HFA) 90 mcg/act inhaler, Inhale 2 puffs every 6 (six) hours as needed for wheezing, Disp: 18 g, Rfl: 5  •  calamine lotion, Apply topically as needed for itching, Disp: 180 mL, Rfl: 0  •  losartan (COZAAR) 25 mg tablet, TAKE 1/2 TABLET BY MOUTH DAILY, Disp: 45 tablet, Rfl: 0              Whom besides the patient is providing clinical information about today's encounter?   NO ADDITIONAL HISTORIAN (patient alone provided history)    Physical Exam and Assessment/Plan by Diagnosis:    ATOPIC DERMATITIS (\"childhood Eczema\")    Physical Exam:  Anatomic Location Affected:  hands, ankles, back of neck   Morphological Description:  erythematous patches   Body Surface Area Today:  15  Overall Severity: moderate  Pertinent Positives:  Pertinent Negatives:    Additional History of Present Condition:  patient states he feels like he is more photo sensitive. Patient states he was recently in [place], it was much worse in [place]. Patient has been using clobetasol as needed for flares. Patient was recently on prednisone for poison ivy      Assessment and Plan:  Based on a thorough discussion of this condition and the management approach to it (including a comprehensive discussion of the known risks, side effects and potential benefits of treatment), the patient (family) agrees to implement the following specific plan:  Continue clobetasol as needed for flares  Would recommend patch testing if flares continue to appear   Follow up in fall   - Recommend sensitive skin care regimen  - detergent free of dyes and perfumes (example, free and clear). Try washing clothes with extra rinse cycle.  - Short lukewarm showers  - White dove bar soap  - Recommend moisturizing whole body with Creams multiple times a day (examples: Cetaphil, CeraVe. and Eucerin)  - avoid aerosols and fragrances in the home (candles, plug ins, perfume, air freshener, etc)      Assessment and Plan:   Atopic Dermatitis is a " "chronic, itchy skin condition that is very common in children but may occur at any age. It is also known as “eczema” or “atopic eczema.” It is the most common form of dermatitis.    Atopic dermatitis usually occurs in people who have an “atopic tendency.”  This means they may develop any or all of these closely linked conditions:  Atopic dermatitis, asthma, hay fever (allergic rhinitis), eosinophilic esophagitis, and gastroenteritis.  Often these conditions run within families with a parent, child or sibling also affected. A family history of asthma, eczema or hay fever is particularly useful in diagnosing atopic dermatitis in infants.    Atopic dermatitis arises because of a complex interaction of genetic and environmental factors. These include defects in skin barrier function making the skin more susceptible to irritation by soap and other contact irritants, the weather, temperature and non-specific triggers.  There is also an element of immune system dysregulation that is often present.  By definition, it is chronic and has a \"waxing-waning\" nature; flares should be expected but with good education and treatment strategies can be minimized.    Some specific tips we discussed:  Dry skin care.  Using only mild cleansers (hypoallergenic and without fragrances) and fragrance free detergent (not “unscented” products which contain a masking agent); we discussed avoiding irritants/fragranced products.  The importance of regular application of moisturizers daily (at least 3 times a day)  The known and theoretical side effects of steroids at length, including but not limited to atrophy of skin and increased pressure in eye (glaucoma) and clouding of the eye's lens (cataracts) if used in or around the eye for extended durations.  The specific over-the-counter interventions and medications.  Side effects, risks and benefits of topical and oral medications discussed.  After lengthy discussion of etiology and treatment " options, we decided to implement the following personalized treatment plan:      EDUCATION AS INTERVENTION!    WHAT IS ATOPIC DERMATITIS?  Atopic dermatitis (also called “eczema”) is a condition of the skin where the skin is dry, red, and itchy.  The main function of the skin is to provide a barrier from the environment and is also the first defense of the immune system.    In atopic dermatitis the skin barrier is decreased or disrupted, and the skin is easily irritated.  As a result, moisture escapes the skin more easily, and environmental allergens and microbes can enter the skin more easily.  Consequently, the skin's immune system is altered.  If there are increased allergic type cells in the skin, the skin may become red and “hyper-excitable.” This leads to itching and a subsequent rash.    WHY DO PEOPLE GET ATOPIC DERMATITIS?  There is no single answer because many factors are involved.  It is likely a combination of genetic makeup and environmental triggers and/or exposures. Excessive drying or sweating of the skin, Irritating soaps, dust mites, and pet dander are some of the more common triggers.  There is no blood test that can be done to confirm this diagnosis. The history and appearance of the skin is usually sufficient for a diagnosis. However, in some cases if the rash does not fit with the history or respond appropriately to treatment, a skin biopsy may be helpful.  Many children do outgrow atopic dermatitis or get better; however, many continue to have sensitive skin into adulthood.  Asthma and hay fever are often seen in many patients with atopic dermatitis; however, asthma flares do not necessarily occur at the same time as skin flares.     PREVENTING FLARES OF ATOPIC DERMATITIS  The first step is to maintain the skin's barrier function.  Keep the skin well moisturized.  Avoid irritants and triggers.  Use prescribed medicine when there are red or rough areas to help the skin to return to normal as  quickly as possible.  Try to limit scratching.     If you keep the skin well moisturized, and avoid coming in contact with things you know irritate your child's skin, there will be less flares.  However, some flares of atopic dermatitis are beyond your control.  You should work with your health care provider to come up with a plan that minimizes flares while minimizing long term use of medications that suppress the immune system.        WHAT ARE SOME OF THE TRIGGERS?  Triggers are different for different people. The most common triggers are:  Heat and sweat for some individuals, cold weather for others.  House dust mites, pet fur.  Wool; synthetic fabrics like nylon; dyed fabrics.  Tobacco smoke   Fragrances in: shampoos, soaps, lotions, laundry detergents, fabric softeners.  Saliva or prolonged exposure to water.    WHAT ABOUT FOOD ALLERGIES?  This is a very controversial topic, as many believe that food allergies are responsible for skin flares. In some cases, specific foods may cause worsening of atopic dermatitis; however this occurs in a minority of cases and usually happens within a few hours of ingestion. While food allergy is more common in children with eczema, foods are specific triggers for flares in only a small percentage of children.  If you notice that the skin flares after certain foods you can see if eliminating one food at time makes a difference, as long as your child can still enjoy a well-balanced diet.   There are blood (RAST) and skin (PRICK) tests that can check for allergies, but they are often positive in children who are not truly allergic. Therefore it is important that you work with your allergist and dermatologist to determine which foods are relevant and causing true symptoms.  Extreme food elimination diets without the guidance of your doctor, which have become more popular in recent years, may even result in worsening of the skin rash due to malnutrition and avoidance of essential  nutrients.    TREATMENT  Treatments are aimed at minimizing exposure to irritating factors and decreasing  the skin inflammation which results in an itchy rash.There are many different treatment options, which depend on your child's rash, its location, and severity.  Topical treatments include corticosteroids and steroid-like creams such as Protopic, Elidel, and Eucrisa, which are believed to not thin the skin.  Please read the discussions below regarding risks and benefits of all of these creams.    Occasionally bacterial or viral infections can occur which flare the skin and require oral and/or topical antibiotics or antivirals. In some cases bleach baths 2-3 times weekly can be helpful to prevent recurrent infection.    For severe disease, strong oral medications such as corticosteroids, methotrexate or azathioprine (Imuran) may be needed. These medications require close monitoring and follow-up. You should discuss the risks/ benefits/alternatives of these medications with your health care provider to come up with the best treatment plan for your child.    1) Use moisturizer all over the entire body at least THREE TIMES a day.  This keeps the skin moisturized to restore the barrier function.  Find a cream or ointment that your child likes - this is the most important.  The medicines do not work in the bottle.  The thicker the moisturizer, generally the better barrier it provides.  Ointments often moisturize better than creams; and creams work better than lotions.  Lotions are more useful during the summer when thick greasy ointments are uncomfortable.  If you put moisturizer on the skin after bathing, while the skin is damp, it is twice as effective.  The moisturizer provides a seal holding the water in the skin.  You may bathe your child in warm - not hot - water, for short periods of time (no more than 5-10 minutes at a time) once a day if they like.  Lightly pat your child dry with a towel and, while the skin  is still damp, (within 3 minutes) apply a moisturizer from head to toe.  If your child is using a medicated cream, apply it and allow it to absorb completely BEFORE you apply the moisturizer.    2) Apply the prescription medication TWICE A DAY to only the red, rough areas on the skin OR AS DIRECTED BY YOUR HEALTH CARE PROVIDER  Put the medication on your fingers and gently rub it into the areas.  Usually the medicine will help an area within a few days time.  Try to put the medicine on for two days after you have noticed that the redness is no longer present; this will help the redness from returning.  The severity of the rash and the strength and usage of the medication will determine how quickly you see improvement.    It is important that you do not overuse steroid creams, and if you notice a thin, shiny appearance to the skin or broken blood vessels, you should stop using the cream and consult your health care provider regarding possible overuse/overthinning of the skin.  The face, armpits and groin have particularly thin and sensitive skin and are therefore most at risk for bad results if steroids are over-used in these sites.      3) Avoid triggers.    Some children have specific things that trigger itching and rashes, while others may have none that can be identified.  It may require a little bit of trial and error to see what applies to your child.  Also, triggers can change over time for your child. The most common triggers are listed above; start with these.  Avoid the use of fabric softeners in the washing machine or dryer sheets (unless they are fragrance-free).  Try to use laundry detergents, soaps and shampoos that are fragrance-free. You may find it helpful to double-rinse your clothes.  Some children are sensitive to house dust mites and they may benefit from a plastic mattress wrap.  While food allergy is more common in children with eczema, foods are specific triggers for flares in only a small  "percentage of children.  If you notice that the skin flares after certain foods you can see if eliminating one food at time makes a difference, as long as your child can still enjoy a well-balanced diet.     4) Consider using a medication like an anti-histamine by mouth to help control the itching.    Scratching only makes the skin more reactive and the barrier function even more disrupted.  It can cause both children and their parents to lose sleep!  There are different types of anti-itch medications.  Some cause more drowsiness than others.  Both types are acceptable depending on your child and your preference.  Start with Benadryl and if that does not work, ask for a prescription “antihistamine.”    5) About the prescription creams:  Corticosteroid creams and ointments (generally things with \"-one\" or \"ana\" on the end of their names):  The strength of the cream or ointment depends on the name of the active ingredient.  The numbers at the end do not indicate the relative strength.  Thus triamcinolone 0.1% ointment, considered a mid-strength corticosteroid, is much stronger than hydrocortisone 1% even though the number following the name is much lower.  Topical corticosteroids are very effective in treating atopic dermatitis.  When used in the manner prescribed (to rashy areas of skin and for no more than a few weeks at a time to any one area) they are very safe.  These are corticosteroids and are anti-inflammatory, not the “anabolic steroids” like those used illegally by some athletes.     Topical non-steroid creams and ointments (immunomodulators):  These creams and ointments are also called topical calcineurin inhibitors (TCIs).  These include Protopic ointment and Elidel cream. Crisaborole 2% (Eucrisa) is a prescription ointment that targets an enzyme called PDE4 (phosphodiesterase 4).  It is used on the skin topically to treat mild-to-moderate eczema in adults and children 2 years of age and older.  In total, " these nonsteroidal prescriptions are used to help decrease itching and redness in the skin.  They are not as strong as most steroid creams; however, it is believed that they do not thin the skin when overused.  They are generally used as second-line medications, though they may be used alone or in conjunction with topical steroids.  In sensitive areas such as the face, underarms or groin, they are often recommended.  They can sting inflamed skin, but are generally well tolerated once the skin is healing.    The FDA placed a “black-box” warning on both Elidel and Protopic in 2006 based on animal studies using the medications.  Some animals developed skin cancer and lymphoma.  Subsequently, the FDA released a statement that there is no causal relationship between the two medications and cancer.  Because of this concern, there are ongoing studies to evaluate this relationship in humans.  So far, there are studies that support the safety of these medications.  One showed that the rates of cancer in patient using these medications topically were less than the rates of the general population and another showed that in patient's using the medication over a large area of the body, the levels of the medication in the blood was undetectable.    As for Eucrisa, this product is only approved for the topical treatment of mild-to-moderate eczema in patients 2 years of age and older; use of the medication in kids younger than 2 is considered “off label” and has not been formally studied.  Burning and stinging are the most commonly reported side effects of this medication.  Rarely, this product has been known to cause hives and hypersensitivity reactions; discontinue its use if you develop severe itching, swelling, or redness in the area of application.     Scribe Attestation    I,:  dAelaida Melissa am acting as a scribe while in the presence of the attending physician.:       I,:  Anita Evans MD personally performed  the services described in this documentation    as scribed in my presence.:

## 2025-04-21 NOTE — PATIENT INSTRUCTIONS
"  ATOPIC DERMATITIS (\"childhood Eczema\")        Assessment and Plan:  Based on a thorough discussion of this condition and the management approach to it (including a comprehensive discussion of the known risks, side effects and potential benefits of treatment), the patient (family) agrees to implement the following specific plan:  Continue clobetasol as needed for flares  Would recommend patch testing if flares continue to appear   Follow up in fall   - Recommend sensitive skin care regimen  - detergent free of dyes and perfumes (example, free and clear). Try washing clothes with extra rinse cycle.  - Short lukewarm showers  - White dove bar soap  - Recommend moisturizing whole body with Creams multiple times a day (examples: Cetaphil, CeraVe. and Eucerin)  - avoid aerosols and fragrances in the home (candles, plug ins, perfume, air freshener, etc)      "

## 2025-05-01 ENCOUNTER — TELEPHONE (OUTPATIENT)
Dept: NEPHROLOGY | Facility: CLINIC | Age: 35
End: 2025-05-01

## 2025-05-04 DIAGNOSIS — I10 BENIGN ESSENTIAL HYPERTENSION: ICD-10-CM

## 2025-05-04 DIAGNOSIS — R80.1 PERSISTENT PROTEINURIA: ICD-10-CM

## 2025-05-05 RX ORDER — LOSARTAN POTASSIUM 25 MG/1
12.5 TABLET ORAL DAILY
Qty: 15 TABLET | Refills: 0 | Status: SHIPPED | OUTPATIENT
Start: 2025-05-05 | End: 2025-05-14 | Stop reason: SDUPTHER

## 2025-05-08 ENCOUNTER — APPOINTMENT (OUTPATIENT)
Age: 35
End: 2025-05-08
Payer: COMMERCIAL

## 2025-05-08 DIAGNOSIS — R80.1 PERSISTENT PROTEINURIA: ICD-10-CM

## 2025-05-08 DIAGNOSIS — I10 BENIGN ESSENTIAL HYPERTENSION: ICD-10-CM

## 2025-05-08 LAB
ANION GAP SERPL CALCULATED.3IONS-SCNC: 7 MMOL/L (ref 4–13)
BACTERIA UR QL AUTO: ABNORMAL /HPF
BILIRUB UR QL STRIP: NEGATIVE
BUN SERPL-MCNC: 8 MG/DL (ref 5–25)
CALCIUM SERPL-MCNC: 9.3 MG/DL (ref 8.4–10.2)
CHLORIDE SERPL-SCNC: 105 MMOL/L (ref 96–108)
CLARITY UR: CLEAR
CO2 SERPL-SCNC: 29 MMOL/L (ref 21–32)
COLOR UR: ABNORMAL
CREAT SERPL-MCNC: 0.72 MG/DL (ref 0.6–1.3)
CREAT UR-MCNC: 113 MG/DL
GFR SERPL CREATININE-BSD FRML MDRD: 119 ML/MIN/1.73SQ M
GLUCOSE P FAST SERPL-MCNC: 93 MG/DL (ref 65–99)
GLUCOSE UR STRIP-MCNC: NEGATIVE MG/DL
HGB UR QL STRIP.AUTO: NEGATIVE
KETONES UR STRIP-MCNC: NEGATIVE MG/DL
LEUKOCYTE ESTERASE UR QL STRIP: NEGATIVE
MICROALBUMIN UR-MCNC: 29.4 MG/L
MICROALBUMIN/CREAT 24H UR: 26 MG/G CREATININE (ref 0–30)
MUCOUS THREADS UR QL AUTO: ABNORMAL
NITRITE UR QL STRIP: NEGATIVE
NON-SQ EPI CELLS URNS QL MICRO: ABNORMAL /HPF
PH UR STRIP.AUTO: 6.5 [PH]
POTASSIUM SERPL-SCNC: 3.9 MMOL/L (ref 3.5–5.3)
PROT UR STRIP-MCNC: ABNORMAL MG/DL
RBC #/AREA URNS AUTO: ABNORMAL /HPF
SODIUM SERPL-SCNC: 141 MMOL/L (ref 135–147)
SP GR UR STRIP.AUTO: 1.01 (ref 1–1.03)
UROBILINOGEN UR STRIP-ACNC: <2 MG/DL
WBC #/AREA URNS AUTO: ABNORMAL /HPF

## 2025-05-08 PROCEDURE — 36415 COLL VENOUS BLD VENIPUNCTURE: CPT

## 2025-05-08 PROCEDURE — 82570 ASSAY OF URINE CREATININE: CPT

## 2025-05-08 PROCEDURE — 81001 URINALYSIS AUTO W/SCOPE: CPT

## 2025-05-08 PROCEDURE — 82043 UR ALBUMIN QUANTITATIVE: CPT

## 2025-05-08 PROCEDURE — 80048 BASIC METABOLIC PNL TOTAL CA: CPT

## 2025-05-12 NOTE — PROGRESS NOTES
NEPHROLOGY OFFICE NOTE    Patient: Tobi Oliveira               Sex: male           YOB: 1990        Age:  35 y.o.       5/14/2025    ASSESSMENT/PLAN     Assessment & Plan  Persistent proteinuria    Orders:    UA (URINE) with reflex to Scope; Future    Albumin / creatinine urine ratio; Future    Urinalysis with microscopic; Future    Albumin / creatinine urine ratio; Future    losartan (COZAAR) 25 mg tablet; Take 0.5 tablets (12.5 mg total) by mouth daily    After review of the medical record, it appears baseline creatinine level fluctuates around 0.7 - 0.8.  Most recent creatinine level was 0.72 with a EGFR of 119 and preserved.     He was noted to have the presence of proteinuria dating back through June of 2023. He had previously undergone serological work-up in June of 2023 - HIV negative, SPEP and UPEP showed no M spike, hepatitis B and C nonreactive, cryoglobulin negative, UGO negative, C3 and C4 within normal limits, and anti-double-stranded DNA were all negative.  All secondary workup was negative to date.     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 He was initiated on losartan 12.5 mg daily.  Most recent urinalysis noted trace proteinuria, urine microalbumin to creatinine ratio was within normal limits at 26, random urine microalbumin slightly elevated at 29.4.    Did endorse mowing the lawn and not drinking as much water prior to urine sample, which may have impacted results slightly.  Advised to avoid strenuous activity within the 24 hours preceding follow-up urine sample and will recheck in 3 months.  In the interim, we will continue current regimen of losartan 12.5 mg daily.  If urine microalbumin level remains elevated, will consider dose titration.  Benign essential hypertension    Orders:    Basic metabolic panel; Future    Urinalysis with microscopic; Future    Albumin / creatinine urine ratio; Future     losartan (COZAAR) 25 mg tablet; Take 0.5 tablets (12.5 mg total) by mouth daily    Currently maintained on losartan 12.5 mg daily.  Blood pressure within excellent range on the current regimen.    BACKGROUND     I had the pleasure of seeing Tobi Oliveira in the nephrology office today for follow-up.  He has a past medical history significant for GERD with esophagitis, hypothyroidism, asthma, hypertension, and migraine headaches.    Is actively following with our office for management of persistent proteinuria.  He does have history of underlying hypertension and was started on losartan 12.5 mg once daily.  He reports that he is intermittently monitoring his blood pressure at home.    He has a history of eosinophilic esophagitis and GERD, he was previously taking PPI therapy on a regular basis.  In the interim, he has no longer been taking.    He has a longstanding history of migraines with documented use of NSAIDs and Imitrex in the past.  He states that he periodically uses Imitrex for management of his migraines.    He had undergone workup with Dr. Sands from urology for urinary hesitancy and frequency.  He had a cystoscopy on 1/22/2024 which was noted to be normal.    He reports that he has been doing overall well since his last office visit with us.    The last blood work was done on 5/8/2025, which we have reviewed together.    SUBJECTIVE     He currently has no major complaints at this time and is feeling well.    REVIEW OF SYSTEMS     Review of Systems   Constitutional:  Negative for activity change and fever.   HENT:  Negative for trouble swallowing.    Respiratory:  Negative for shortness of breath.    Cardiovascular:  Negative for leg swelling.   Gastrointestinal:  Negative for nausea and vomiting.   Genitourinary:  Negative for difficulty urinating, dysuria, frequency and hematuria.   Musculoskeletal:  Negative for back pain.   Skin:  Negative for pallor.   Neurological:  Negative for dizziness,  syncope, weakness and light-headedness.   Psychiatric/Behavioral:  Negative for sleep disturbance. The patient is not nervous/anxious.        OBJECTIVE     Current Weight: Weight - Scale: 88.5 kg (195 lb)  Vitals:    05/14/25 1036   BP: 122/74   Pulse: 70   Temp: 98.1 °F (36.7 °C)   SpO2: 99%     Body mass index is 25.73 kg/m².    CURRENT MEDICATIONS     Current Medications[1]      PAST MEDICAL HISTORY     Past Medical History:   Diagnosis Date    Allergic 1996    Runny nose usually it, skin doesn't do well with poison ivy    Asthma     5/12/21 Pt states has not had any recent issues with asthma    Bleeding per rectum 12/12/2017    Eosinophilic esophagitis     GERD (gastroesophageal reflux disease)     History of migraine headaches     5/12/21 Pt states with migraine headaches approx 2x/week. Pt has family hx and also with seasonal weather changes.     Left shoulder pain 04/02/2020    Lower urinary tract symptoms (LUTS)     Overweight 05/21/2020    Pneumonia 2007       PAST SURGICAL HISTORY     Past Surgical History:   Procedure Laterality Date    CYSTOSCOPY      for urethral stricture    ESOPHAGOGASTRODUODENOSCOPY      WI ESOPHAGOGASTRODUODENOSCOPY TRANSORAL DIAGNOSTIC N/A 3/11/2019    Procedure: ESOPHAGOGASTRODUODENOSCOPY (EGD);  Surgeon: Francis Hernandez DO;  Location: [facility];  Service: Gastroenterology    WI VASECTOMY UNI/BI SPX W/POSTOP SEMEN EXAMS Bilateral 5/14/2021    Procedure: VASECTOMY;  Surgeon: Domenic Bolanos MD;  Location: [facility] MAIN OR;  Service: Urology    TONSILLECTOMY         ALLERGIES     Allergies[2]    SOCIAL HISTORY     Social History     Substance and Sexual Activity   Alcohol Use Not Currently    Comment: Denies     Social History     Substance and Sexual Activity   Drug Use Not Currently    Comment: Denies     Tobacco Use History[3]    FAMILY HISTORY     Family History   Problem Relation Age of Onset    Migraines Mother     Hypertension Father     Depression Father         General  depression    Parkinsonism Father     No Known Problems Sister     Asthma Sister     Autoimmune disease Sister         Hashimoto disease    Hip dysplasia Brother     Depression Brother         Bipolar 2    Bipolar disorder Brother     No Known Problems Maternal Grandmother     No Known Problems Maternal Grandfather     No Known Problems Paternal Grandmother     No Known Problems Paternal Grandfather     No Known Problems Maternal Aunt     No Known Problems Maternal Uncle     No Known Problems Paternal Aunt     No Known Problems Paternal Uncle     No Known Problems Cousin     Heart disease Family     Migraines Family        PHYSICAL EXAMINATION     Physical Exam  Vitals reviewed.   Constitutional:       General: He is not in acute distress.  HENT:      Head: Normocephalic.      Mouth/Throat:      Lips: Pink.      Mouth: Mucous membranes are moist.     Eyes:      General: Lids are normal. No scleral icterus.      Cardiovascular:      Rate and Rhythm: Normal rate and regular rhythm.      Heart sounds: S1 normal and S2 normal.   Pulmonary:      Effort: Pulmonary effort is normal. No accessory muscle usage or respiratory distress.      Breath sounds: Normal breath sounds.   Abdominal:      General: There is no distension.      Tenderness: There is no abdominal tenderness.     Musculoskeletal:      Cervical back: Normal range of motion and neck supple. No tenderness.      Right lower leg: No edema.      Left lower leg: No edema.     Skin:     General: Skin is warm.      Coloration: Skin is not cyanotic or jaundiced.     Neurological:      General: No focal deficit present.      Mental Status: He is alert and oriented to person, place, and time.     Psychiatric:         Attention and Perception: Attention normal.         Speech: Speech normal.         Behavior: Behavior is cooperative.           LAB RESULTS     Results from last 7 days   Lab Units 05/08/25  0821   SODIUM mmol/L 141   POTASSIUM mmol/L 3.9   CHLORIDE mmol/L  "105   CO2 mmol/L 29   BUN mg/dL 8   CREATININE mg/dL 0.72   EGFR ml/min/1.73sq m 119   CALCIUM mg/dL 9.3         RADIOLOGY RESULTS      US Kidney and Bladder 6/20/2023:  FINDINGS:     KIDNEYS:  Symmetric and normal size.  Right kidney:  12.9 x 4.8 x 5.3 cm. Volume 173.0 mL  Left kidney:  11.8 x 5.7 x 5.3 cm.  Volume 188.1 mL     Right kidney  Normal echogenicity and contour.  No mass is identified.  No hydronephrosis.  No shadowing calculi.  No perinephric fluid collections.     Left kidney  Normal echogenicity and contour.  No mass is identified.  No hydronephrosis.  No shadowing calculi.  No perinephric fluid collections.     URETERS:  Nonvisualized.     BLADDER:  Normally distended.  No focal thickening or mass lesions.  Bilateral ureteral jets detected.  Prevoid: 338.1  No significant post void volume. Measured post void volume in mL: 7.4        IMPRESSION:     Unremarkable renal sonogram.      Recommend Nephrology follow-up in 12 months.    MAY Moyer  Nephrology  5/14/2025      Portions of the record may have been created with voice recognition software. Occasional wrong word or \"sound a like\" substitutions may have occurred due to the inherent limitations of voice recognition software. Read the chart carefully and recognize, using context, where substitutions have occurred.          [1]   Current Outpatient Medications:     albuterol (Ventolin HFA) 90 mcg/act inhaler, Inhale 2 puffs every 6 (six) hours as needed for wheezing, Disp: 18 g, Rfl: 5    calamine lotion, Apply topically as needed for itching, Disp: 180 mL, Rfl: 0    clobetasol (TEMOVATE) 0.05 % cream, Apply topically 2 (two) times a day, Disp: 45 g, Rfl: 1    losartan (COZAAR) 25 mg tablet, Take 0.5 tablets (12.5 mg total) by mouth daily, Disp: 45 tablet, Rfl: 2    Adapalene 0.3 % gel, Apply pea-sized amount to forehead at bedtime (Patient not taking: Reported on 4/2/2025), Disp: 45 g, Rfl: 5  [2]   Allergies  Allergen Reactions    " Beef-Derived Products - Food Allergy Throat Swelling     EOE    Milk (Cow) Other (See Comments)     Milk allergy -eosinophilic esophagitis    Poison Ivy Extract Rash   [3]   Social History  Tobacco Use   Smoking Status Never    Passive exposure: Never   Smokeless Tobacco Never   Tobacco Comments    non-smoker

## 2025-05-14 ENCOUNTER — OFFICE VISIT (OUTPATIENT)
Dept: NEPHROLOGY | Facility: CLINIC | Age: 35
End: 2025-05-14
Payer: COMMERCIAL

## 2025-05-14 VITALS
HEART RATE: 70 BPM | BODY MASS INDEX: 25.84 KG/M2 | HEIGHT: 73 IN | WEIGHT: 195 LBS | OXYGEN SATURATION: 99 % | TEMPERATURE: 98.1 F | SYSTOLIC BLOOD PRESSURE: 122 MMHG | DIASTOLIC BLOOD PRESSURE: 74 MMHG

## 2025-05-14 DIAGNOSIS — R80.1 PERSISTENT PROTEINURIA: Primary | ICD-10-CM

## 2025-05-14 DIAGNOSIS — I10 BENIGN ESSENTIAL HYPERTENSION: ICD-10-CM

## 2025-05-14 PROCEDURE — 99214 OFFICE O/P EST MOD 30 MIN: CPT

## 2025-05-14 RX ORDER — LOSARTAN POTASSIUM 25 MG/1
12.5 TABLET ORAL DAILY
Qty: 45 TABLET | Refills: 2 | Status: SHIPPED | OUTPATIENT
Start: 2025-05-14

## 2025-05-14 NOTE — ASSESSMENT & PLAN NOTE
Orders:    Basic metabolic panel; Future    Urinalysis with microscopic; Future    Albumin / creatinine urine ratio; Future    losartan (COZAAR) 25 mg tablet; Take 0.5 tablets (12.5 mg total) by mouth daily    Currently maintained on losartan 12.5 mg daily.  Blood pressure within excellent range on the current regimen.

## 2025-05-14 NOTE — LETTER
May 14, 2025     No Recipients    Patient: Tobi Oliveira   YOB: 1990   Date of Visit: 5/14/2025       Dear Dr. Moon River, DO:    Thank you for referring Tobi Oliveira to me for evaluation. Below are my notes for this consultation.    If you have questions, please do not hesitate to call me. I look forward to following your patient along with you.         Sincerely,        MAY Moyer        CC: No Recipients    MAY Moyer  5/14/2025 11:22 AM  Sign when Signing Visit  NEPHROLOGY OFFICE NOTE    Patient: Tobi Oliveira               Sex: male           YOB: 1990        Age:  35 y.o.       5/14/2025    ASSESSMENT/PLAN     Assessment & Plan  Persistent proteinuria    Orders:  •  UA (URINE) with reflex to Scope; Future  •  Albumin / creatinine urine ratio; Future  •  Urinalysis with microscopic; Future  •  Albumin / creatinine urine ratio; Future  •  losartan (COZAAR) 25 mg tablet; Take 0.5 tablets (12.5 mg total) by mouth daily    After review of the medical record, it appears baseline creatinine level fluctuates around 0.7 - 0.8.  Most recent creatinine level was 0.72 with a EGFR of 119 and preserved.     He was noted to have the presence of proteinuria dating back through June of 2023. He had previously undergone serological work-up in June of 2023 - HIV negative, SPEP and UPEP showed no M spike, hepatitis B and C nonreactive, cryoglobulin negative, UGO negative, C3 and C4 within normal limits, and anti-double-stranded DNA were all negative.      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 He was initiated on losartan 12.5 mg daily.  Most recent urinalysis noted trace proteinuria, urine microalbumin to creatinine ratio was within normal limits at 26, random urine microalbumin slightly elevated at 29.4.    Did endorse mowing the lawn and are drinking as much water prior  to urine sample which may have impacted results slightly.  Advised to avoid strenuous activity within the 24 hours preceding follow-up urine sample and will recheck in 3 months.  In the interim, we will continue current regimen of losartan 12.5 mg daily.  Benign essential hypertension    Orders:  •  Basic metabolic panel; Future  •  Urinalysis with microscopic; Future  •  Albumin / creatinine urine ratio; Future  •  losartan (COZAAR) 25 mg tablet; Take 0.5 tablets (12.5 mg total) by mouth daily    Currently maintained on losartan 12.5 mg daily.  Blood pressure within excellent range on the current regimen.    BACKGROUND     I had the pleasure of seeing Tobi Oliveira in the nephrology office today for follow-up.  He has a past medical history significant for GERD with esophagitis, hypothyroidism, asthma, hypertension, and migraine headaches.    Is actively following with our office for management of persistent proteinuria.  He does have history of underlying hypertension and was started on losartan 12.5 mg once daily.  He reports that he is intermittently monitoring his blood pressure at home.    He has a history of eosinophilic esophagitis and GERD, he was previously taking PPI therapy on a regular basis.  In the interim, he has no longer been taking.    He has a longstanding history of migraines with documented use of NSAIDs and Imitrex in the past.  He states that he periodically uses Imitrex for management of his migraines.    He had undergone workup with Dr. Sands from urology for urinary hesitancy and frequency.  He had a cystoscopy on 1/22/2024 which was noted to be normal.    He reports that he has been doing overall well since his last office visit with us.    The last blood work was done on 5/8/2025, which we have reviewed together.    SUBJECTIVE     He currently has no major complaints at this time and is feeling well.    REVIEW OF SYSTEMS     Review of Systems   Constitutional:  Negative for activity  change and fever.   HENT:  Negative for trouble swallowing.    Respiratory:  Negative for shortness of breath.    Cardiovascular:  Negative for leg swelling.   Gastrointestinal:  Negative for nausea and vomiting.   Genitourinary:  Negative for difficulty urinating, dysuria, frequency and hematuria.   Musculoskeletal:  Negative for back pain.   Skin:  Negative for pallor.   Neurological:  Negative for dizziness, syncope, weakness and light-headedness.   Psychiatric/Behavioral:  Negative for sleep disturbance. The patient is not nervous/anxious.        OBJECTIVE     Current Weight: Weight - Scale: 88.5 kg (195 lb)  Vitals:    05/14/25 1036   BP: 122/74   Pulse: 70   Temp: 98.1 °F (36.7 °C)   SpO2: 99%     Body mass index is 25.73 kg/m².    CURRENT MEDICATIONS     Current Medications[1]      PAST MEDICAL HISTORY     Past Medical History:   Diagnosis Date   • Allergic 1996    Runny nose usually it, skin doesn't do well with poison ivy   • Asthma     5/12/21 Pt states has not had any recent issues with asthma   • Bleeding per rectum 12/12/2017   • Eosinophilic esophagitis    • GERD (gastroesophageal reflux disease)    • History of migraine headaches     5/12/21 Pt states with migraine headaches approx 2x/week. Pt has family hx and also with seasonal weather changes.    • Left shoulder pain 04/02/2020   • Lower urinary tract symptoms (LUTS)    • Overweight 05/21/2020   • Pneumonia 2007       PAST SURGICAL HISTORY     Past Surgical History:   Procedure Laterality Date   • CYSTOSCOPY      for urethral stricture   • ESOPHAGOGASTRODUODENOSCOPY     • [place] ESOPHAGOGASTRODUODENOSCOPY TRANSORAL DIAGNOSTIC N/A 3/11/2019    Procedure: ESOPHAGOGASTRODUODENOSCOPY (EGD);  Surgeon: Francis Hernandez DO;  Location: [facility];  Service: Gastroenterology   • HI VASECTOMY UNI/BI SPX W/POSTOP SEMEN EXAMS Bilateral 5/14/2021    Procedure: VASECTOMY;  Surgeon: Domenic Bolanos MD;  Location: [facility];  Service: Urology   • TONSILLECTOMY          ALLERGIES     Allergies[2]    SOCIAL HISTORY     Social History     Substance and Sexual Activity   Alcohol Use Not Currently    Comment: Denies     Social History     Substance and Sexual Activity   Drug Use Not Currently    Comment: Denies     Tobacco Use History[3]    FAMILY HISTORY     Family History   Problem Relation Age of Onset   • Migraines Mother    • Hypertension Father    • Depression Father         General depression   • Parkinsonism Father    • No Known Problems Sister    • Asthma Sister    • Autoimmune disease Sister         Hashimoto disease   • Hip dysplasia Brother    • Depression Brother         Bipolar 2   • Bipolar disorder Brother    • No Known Problems Maternal Grandmother    • No Known Problems Maternal Grandfather    • No Known Problems Paternal Grandmother    • No Known Problems Paternal Grandfather    • No Known Problems Maternal Aunt    • No Known Problems Maternal Uncle    • No Known Problems Paternal Aunt    • No Known Problems Paternal Uncle    • No Known Problems Cousin    • Heart disease Family    • Migraines Family        PHYSICAL EXAMINATION     Physical Exam  Vitals reviewed.   Constitutional:       General: He is not in acute distress.  HENT:      Head: Normocephalic.      Mouth/Throat:      Lips: Pink.      Mouth: Mucous membranes are moist.     Eyes:      General: Lids are normal. No scleral icterus.      Cardiovascular:      Rate and Rhythm: Normal rate and regular rhythm.      Heart sounds: S1 normal and S2 normal.   Pulmonary:      Effort: Pulmonary effort is normal. No accessory muscle usage or respiratory distress.      Breath sounds: Normal breath sounds.   Abdominal:      General: There is no distension.      Tenderness: There is no abdominal tenderness.     Musculoskeletal:      Cervical back: Normal range of motion and neck supple. No tenderness.      Right lower leg: No edema.      Left lower leg: No edema.     Skin:     General: Skin is warm.      Coloration:  "Skin is not cyanotic or jaundiced.     Neurological:      General: No focal deficit present.      Mental Status: He is alert and oriented to person, place, and time.     Psychiatric:         Attention and Perception: Attention normal.         Speech: Speech normal.         Behavior: Behavior is cooperative.           LAB RESULTS     Results from last 7 days   Lab Units 05/08/25  0821   SODIUM mmol/L 141   POTASSIUM mmol/L 3.9   CHLORIDE mmol/L 105   CO2 mmol/L 29   BUN mg/dL 8   CREATININE mg/dL 0.72   EGFR ml/min/1.73sq m 119   CALCIUM mg/dL 9.3         RADIOLOGY RESULTS      US Kidney and Bladder 6/20/2023:  FINDINGS:     KIDNEYS:  Symmetric and normal size.  Right kidney:  12.9 x 4.8 x 5.3 cm. Volume 173.0 mL  Left kidney:  11.8 x 5.7 x 5.3 cm.  Volume 188.1 mL     Right kidney  Normal echogenicity and contour.  No mass is identified.  No hydronephrosis.  No shadowing calculi.  No perinephric fluid collections.     Left kidney  Normal echogenicity and contour.  No mass is identified.  No hydronephrosis.  No shadowing calculi.  No perinephric fluid collections.     URETERS:  Nonvisualized.     BLADDER:  Normally distended.  No focal thickening or mass lesions.  Bilateral ureteral jets detected.  Prevoid: 338.1  No significant post void volume. Measured post void volume in mL: 7.4        IMPRESSION:     Unremarkable renal sonogram.      Recommend Nephrology follow-up in 12 months.    MAY Moyer  Nephrology  5/14/2025      Portions of the record may have been created with voice recognition software. Occasional wrong word or \"sound a like\" substitutions may have occurred due to the inherent limitations of voice recognition software. Read the chart carefully and recognize, using context, where substitutions have occurred.          [1]   Current Outpatient Medications:   •  albuterol (Ventolin HFA) 90 mcg/act inhaler, Inhale 2 puffs every 6 (six) hours as needed for wheezing, Disp: 18 g, Rfl: 5  •  " calamine lotion, Apply topically as needed for itching, Disp: 180 mL, Rfl: 0  •  clobetasol (TEMOVATE) 0.05 % cream, Apply topically 2 (two) times a day, Disp: 45 g, Rfl: 1  •  losartan (COZAAR) 25 mg tablet, Take 0.5 tablets (12.5 mg total) by mouth daily, Disp: 45 tablet, Rfl: 2  •  Adapalene 0.3 % gel, Apply pea-sized amount to forehead at bedtime (Patient not taking: Reported on 4/2/2025), Disp: 45 g, Rfl: 5  [2]   Allergies  Allergen Reactions   • Beef-Derived Products - Food Allergy Throat Swelling     EOE   • Milk (Cow) Other (See Comments)     Milk allergy -eosinophilic esophagitis   • Poison Ivy Extract Rash   [3]   Social History  Tobacco Use   Smoking Status Never   • Passive exposure: Never   Smokeless Tobacco Never   Tobacco Comments    non-smoker

## 2025-05-14 NOTE — PATIENT INSTRUCTIONS
Avoid strenuous activity 24 hours prior to giving urine sample  Stay hydrated with up to 64 ounces of water daily   Try to avoid routine use of NSAIDS (e.g, Ibuprofen, Motrin, Aleve, Naproxen, Advil)   We will repeat your urine test in 3 months.   We will check a blood and urine test prior to your 1 year follow-up.

## 2025-05-14 NOTE — ASSESSMENT & PLAN NOTE
Orders:    UA (URINE) with reflex to Scope; Future    Albumin / creatinine urine ratio; Future    Urinalysis with microscopic; Future    Albumin / creatinine urine ratio; Future    losartan (COZAAR) 25 mg tablet; Take 0.5 tablets (12.5 mg total) by mouth daily    After review of the medical record, it appears baseline creatinine level fluctuates around 0.7 - 0.8.  Most recent creatinine level was 0.72 with a EGFR of 119 and preserved.     He was noted to have the presence of proteinuria dating back through June of 2023. He had previously undergone serological work-up in June of 2023 - HIV negative, SPEP and UPEP showed no M spike, hepatitis B and C nonreactive, cryoglobulin negative, UGO negative, C3 and C4 within normal limits, and anti-double-stranded DNA were all negative.  All secondary workup was negative to date.     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 He was initiated on losartan 12.5 mg daily.  Most recent urinalysis noted trace proteinuria, urine microalbumin to creatinine ratio was within normal limits at 26, random urine microalbumin slightly elevated at 29.4.    Did endorse mowing the lawn and not drinking as much water prior to urine sample, which may have impacted results slightly.  Advised to avoid strenuous activity within the 24 hours preceding follow-up urine sample and will recheck in 3 months.  In the interim, we will continue current regimen of losartan 12.5 mg daily.  If urine microalbumin level remains elevated, will consider dose titration.

## 2025-05-22 ENCOUNTER — OFFICE VISIT (OUTPATIENT)
Age: 35
End: 2025-05-22
Payer: COMMERCIAL

## 2025-05-22 VITALS
WEIGHT: 193.6 LBS | HEART RATE: 68 BPM | OXYGEN SATURATION: 98 % | RESPIRATION RATE: 18 BRPM | HEIGHT: 73 IN | TEMPERATURE: 97.6 F | DIASTOLIC BLOOD PRESSURE: 66 MMHG | SYSTOLIC BLOOD PRESSURE: 120 MMHG | BODY MASS INDEX: 25.66 KG/M2

## 2025-05-22 DIAGNOSIS — R80.1 PERSISTENT PROTEINURIA: ICD-10-CM

## 2025-05-22 DIAGNOSIS — I10 BENIGN ESSENTIAL HYPERTENSION: ICD-10-CM

## 2025-05-22 DIAGNOSIS — Z00.00 ANNUAL PHYSICAL EXAM: Primary | ICD-10-CM

## 2025-05-22 DIAGNOSIS — K20.0 EOSINOPHILIC ESOPHAGITIS: ICD-10-CM

## 2025-05-22 DIAGNOSIS — J45.40 ASTHMA IN ADULT, MODERATE PERSISTENT, UNCOMPLICATED: ICD-10-CM

## 2025-05-22 DIAGNOSIS — G43.009 MIGRAINE WITHOUT AURA AND WITHOUT STATUS MIGRAINOSUS, NOT INTRACTABLE: ICD-10-CM

## 2025-05-22 PROBLEM — E03.9 ACQUIRED HYPOTHYROIDISM: Status: RESOLVED | Noted: 2017-08-17 | Resolved: 2025-05-22

## 2025-05-22 PROCEDURE — 99214 OFFICE O/P EST MOD 30 MIN: CPT | Performed by: FAMILY MEDICINE

## 2025-05-22 PROCEDURE — 99395 PREV VISIT EST AGE 18-39: CPT | Performed by: FAMILY MEDICINE

## 2025-05-22 RX ORDER — SUMATRIPTAN SUCCINATE 100 MG/1
100 TABLET ORAL ONCE AS NEEDED
Qty: 10 TABLET | Refills: 5 | Status: SHIPPED | OUTPATIENT
Start: 2025-05-22

## 2025-05-22 NOTE — PROGRESS NOTES
Adult Annual Physical  Name: Tobi Oliveira      : 1990      MRN: [id number]  Encounter Provider: [place] DO  Encounter Date: 2025   Encounter department: [facility] [place]    :  Assessment & Plan  Annual physical exam    Orders:    Lipid Panel with Direct LDL reflex; Future    Persistent proteinuria  Chronic.  Trace.  Has been stable for greater than 2 years.  Will continue to monitor  Orders:    TSH, 3rd generation with Free T4 reflex; Future    CBC and differential; Future    Asthma in adult, moderate persistent, uncomplicated  Controlled with as needed albuterol       Migraine without aura and without status migrainosus, not intractable  Controlled with as needed triptan.  Refill provided    Orders:    SUMAtriptan (IMITREX) 100 mg tablet; Take 1 tablet (100 mg total) by mouth once as needed for migraine for up to 60 doses may repeat in 2 hours if necessary. Max dose 200mg in 24 hour period.    Benign essential hypertension  Controlled on current antihypertensive.  Patient continue losartan 12.5 mg daily  Orders:    Comprehensive metabolic panel; Future    Eosinophilic esophagitis  Longstanding history.  Positive esophageal biopsy.  Tries to avoid allergy triggers.  Would like further testing concerning allergies.  Referral placed.  Orders:    Ambulatory Referral to Allergy; Future                   Preventive Screenings:  - Diabetes Screening: screening up-to-date  - Hepatitis C screening: screening up-to-date   - HIV screening: screening up-to-date   - Lung cancer screening: screening not indicated   - Prostate cancer screening: screening not indicated     Counseling/Anticipatory Guidance:    - Diet: discussed recommendations for a healthy/well-balanced diet.   - Exercise: the importance of regular exercise/physical activity was discussed. Recommend exercise 3-5 times per week for at least 30 minutes.   - Injury prevention: discussed safety/seat belts, safety  "helmets, smoke detectors, carbon monoxide detectors, and smoking near bedding or upholstery.          History of Present Illness     Adult Annual Physical:  Patient presents for annual physical.     Diet and Physical Activity:  - Diet/Nutrition: well balanced diet. Mostly vegan diet with eggs  - Exercise: walking. Was better about 30 minutes on my elipical last year need to get back to it    General Health:  - Sleep: sleeps well.  - Hearing: normal hearing bilateral ears.  - Vision: wears glasses.  - Dental: regular dental visits.    /GYN Health:  - Follows with GYN: no.   - History of STDs: no     Health:  - History of STDs: no.   - Urinary symptoms: none.     Advanced Care Planning:  - Has an advanced directive?: no    - Has a durable medical POA?: no      Review of Systems   Respiratory:  Negative for cough and shortness of breath.    Cardiovascular:  Negative for chest pain.   Gastrointestinal:  Negative for constipation and diarrhea.   Musculoskeletal:  Negative for arthralgias, back pain and gait problem.   Skin:  Negative for rash.   Neurological:  Negative for headaches.         Objective   /66 (BP Location: Left arm, Patient Position: Sitting, Cuff Size: Standard)   Pulse 68   Temp 97.6 °F (36.4 °C) (Tympanic)   Resp 18   Ht 6' 1\" (1.854 m)   Wt 87.8 kg (193 lb 9.6 oz)   SpO2 98%   BMI 25.54 kg/m²     Physical Exam  Vitals and nursing note reviewed.   Constitutional:       General: He is not in acute distress.     Appearance: He is well-developed.   HENT:      Head: Normocephalic and atraumatic.      Right Ear: External ear normal.      Left Ear: External ear normal.     Eyes:      Extraocular Movements: Extraocular movements intact.      Conjunctiva/sclera: Conjunctivae normal.      Pupils: Pupils are equal, round, and reactive to light.       Cardiovascular:      Rate and Rhythm: Normal rate and regular rhythm.      Heart sounds: No murmur heard.  Pulmonary:      Effort: Pulmonary effort " is normal. No respiratory distress.      Breath sounds: Normal breath sounds.   Abdominal:      Palpations: Abdomen is soft.      Tenderness: There is no abdominal tenderness.     Musculoskeletal:      Right lower leg: No edema.      Left lower leg: No edema.     Skin:     General: Skin is warm and dry.      Capillary Refill: Capillary refill takes less than 2 seconds.     Neurological:      Mental Status: He is alert and oriented to person, place, and time.      Gait: Gait normal.     Psychiatric:         Mood and Affect: Mood normal.         Behavior: Behavior normal.

## 2025-05-22 NOTE — PATIENT INSTRUCTIONS
"Patient Education     Routine physical for adults   The Basics   Written by the doctors and editors at [facility]   What is a physical? -- A physical is a routine visit, or \"check-up,\" with your doctor. You might also hear it called a \"wellness visit\" or \"preventive visit.\"  During each visit, the doctor will:   Ask about your physical and mental health   Ask about your habits, behaviors, and lifestyle   Do an exam   Give you vaccines if needed   Talk to you about any medicines you take   Give advice about your health   Answer your questions  Getting regular check-ups is an important part of taking care of your health. It can help your doctor find and treat any problems you have. But it's also important for preventing health problems.  A routine physical is different from a \"sick visit.\" A sick visit is when you see a doctor because of a health concern or problem. Since physicals are scheduled ahead of time, you can think about what you want to ask the doctor.  How often should I get a physical? -- It depends on your age and health. In general, for people age 21 years and older:   If you are younger than 50 years, you might be able to get a physical every 3 years.   If you are 50 years or older, your doctor might recommend a physical every year.  If you have an ongoing health condition, like diabetes or high blood pressure, your doctor will probably want to see you more often.  What happens during a physical? -- In general, each visit will include:   Physical exam - The doctor or nurse will check your height, weight, heart rate, and blood pressure. They will also look at your eyes and ears. They will ask about how you are feeling and whether you have any symptoms that bother you.   Medicines - It's a good idea to bring a list of all the medicines you take to each doctor visit. Your doctor will talk to you about your medicines and answer any questions. Tell them if you are having any side effects that bother you. You " "should also tell them if you are having trouble paying for any of your medicines.   Habits and behaviors - This includes:   Your diet   Your exercise habits   Whether you smoke, drink alcohol, or use drugs   Whether you are sexually active   Whether you feel safe at home  Your doctor will talk to you about things you can do to improve your health and lower your risk of health problems. They will also offer help and support. For example, if you want to quit smoking, they can give you advice and might prescribe medicines. If you want to improve your diet or get more physical activity, they can help you with this, too.   Lab tests, if needed - The tests you get will depend on your age and situation. For example, your doctor might want to check your:   Cholesterol   Blood sugar   Iron level   Vaccines - The recommended vaccines will depend on your age, health, and what vaccines you already had. Vaccines are very important because they can prevent certain serious or deadly infections.   Discussion of screening - \"Screening\" means checking for diseases or other health problems before they cause symptoms. Your doctor can recommend screening based on your age, risk, and preferences. This might include tests to check for:   Cancer, such as breast, prostate, cervical, ovarian, colorectal, prostate, lung, or skin cancer   Sexually transmitted infections, such as chlamydia and gonorrhea   Mental health conditions like depression and anxiety  Your doctor will talk to you about the different types of screening tests. They can help you decide which screenings to have. They can also explain what the results might mean.   Answering questions - The physical is a good time to ask the doctor or nurse questions about your health. If needed, they can refer you to other doctors or specialists, too.  Adults older than 65 years often need other care, too. As you get older, your doctor will talk to you about:   How to prevent falling at " home   Hearing or vision tests   Memory testing   How to take your medicines safely   Making sure that you have the help and support you need at home  All topics are updated as new evidence becomes available and our peer review process is complete.  This topic retrieved from [employer] on: May 02, 2024.  Topic 774170 Version 1.0  Release: 32.4.3 - C32.122  © 2024 UpToDate, Inc. and/or its affiliates. All rights reserved.  Consumer Information Use and Disclaimer   Disclaimer: This generalized information is a limited summary of diagnosis, treatment, and/or medication information. It is not meant to be comprehensive and should be used as a tool to help the user understand and/or assess potential diagnostic and treatment options. It does NOT include all information about conditions, treatments, medications, side effects, or risks that may apply to a specific patient. It is not intended to be medical advice or a substitute for the medical advice, diagnosis, or treatment of a health care provider based on the health care provider's examination and assessment of a patient's specific and unique circumstances. Patients must speak with a health care provider for complete information about their health, medical questions, and treatment options, including any risks or benefits regarding use of medications. This information does not endorse any treatments or medications as safe, effective, or approved for treating a specific patient. Up[employer]Date, Inc. and its affiliates disclaim any warranty or liability relating to this information or the use thereof.The use of this information is governed by the Terms of Use, available at https://www.woltersAluwaveuwer.com/en/know/clinical-effectiveness-terms. 2024© UpToDate, Inc. and its affiliates and/or licensors. All rights reserved.  Copyright   © 2024 Up[employer]Date, Inc. and/or its affiliates. All rights reserved.

## 2025-06-16 NOTE — ASSESSMENT & PLAN NOTE
Controlled on current antihypertensive.  Patient continue losartan 12.5 mg daily  Orders:    Comprehensive metabolic panel; Future

## 2025-06-16 NOTE — ASSESSMENT & PLAN NOTE
Longstanding history.  Positive esophageal biopsy.  Tries to avoid allergy triggers.  Would like further testing concerning allergies.  Referral placed.  Orders:    Ambulatory Referral to Allergy; Future

## 2025-06-16 NOTE — ASSESSMENT & PLAN NOTE
Controlled with as needed triptan.  Refill provided    Orders:    SUMAtriptan (IMITREX) 100 mg tablet; Take 1 tablet (100 mg total) by mouth once as needed for migraine for up to 60 doses may repeat in 2 hours if necessary. Max dose 200mg in 24 hour period.

## 2025-06-16 NOTE — ASSESSMENT & PLAN NOTE
Chronic.  Trace.  Has been stable for greater than 2 years.  Will continue to monitor  Orders:    TSH, 3rd generation with Free T4 reflex; Future    CBC and differential; Future

## 2025-07-24 ENCOUNTER — TELEPHONE (OUTPATIENT)
Dept: NEPHROLOGY | Facility: CLINIC | Age: 35
End: 2025-07-24

## 2025-07-24 NOTE — TELEPHONE ENCOUNTER
I called and spoke to the patient and schedule his 12 month nephrology follow up appointment with Dr. JOSE Gilbert from our recall list. The patient understood and was okay with the day and time of his next appointment.

## 2025-08-20 ENCOUNTER — TELEPHONE (OUTPATIENT)
Dept: DERMATOLOGY | Facility: CLINIC | Age: 35
End: 2025-08-20

## (undated) DEVICE — SUT CHROMIC 2-0 SH 27 IN G123H

## (undated) DEVICE — PENCIL ELECTROSURG E-Z CLEAN -0035H

## (undated) DEVICE — INTENDED FOR TISSUE SEPARATION, AND OTHER PROCEDURES THAT REQUIRE A SHARP SURGICAL BLADE TO PUNCTURE OR CUT.: Brand: BARD-PARKER SAFETY BLADES SIZE 15, STERILE

## (undated) DEVICE — SUT CHROMIC 3-0 SH 27 IN G122H

## (undated) DEVICE — SUT SILK 2-0 TIES 144 IN LA55G

## (undated) DEVICE — PAD GROUNDING ADULT

## (undated) DEVICE — BETHLEHEM UNIVERSAL OUTPATIENT: Brand: CARDINAL HEALTH

## (undated) DEVICE — CHLORHEXIDINE 4PCT 4 OZ

## (undated) DEVICE — NEEDLE 25G X 1 1/2

## (undated) DEVICE — GLOVE SRG BIOGEL ECLIPSE 7

## (undated) DEVICE — MINOR PROCEDURE DRAPE: Brand: CONVERTORS

## (undated) DEVICE — PREMIUM DRY TRAY LF: Brand: MEDLINE INDUSTRIES, INC.